# Patient Record
Sex: MALE | Race: WHITE | NOT HISPANIC OR LATINO | Employment: UNEMPLOYED | URBAN - NONMETROPOLITAN AREA
[De-identification: names, ages, dates, MRNs, and addresses within clinical notes are randomized per-mention and may not be internally consistent; named-entity substitution may affect disease eponyms.]

---

## 2017-12-30 ENCOUNTER — APPOINTMENT (OUTPATIENT)
Dept: GENERAL RADIOLOGY | Facility: HOSPITAL | Age: 52
End: 2017-12-30

## 2017-12-30 ENCOUNTER — HOSPITAL ENCOUNTER (EMERGENCY)
Facility: HOSPITAL | Age: 52
Discharge: HOME | End: 2017-12-30
Attending: FAMILY MEDICINE | Admitting: FAMILY MEDICINE

## 2017-12-30 ENCOUNTER — APPOINTMENT (OUTPATIENT)
Dept: CT IMAGING | Facility: HOSPITAL | Age: 52
End: 2017-12-30

## 2017-12-30 VITALS
OXYGEN SATURATION: 98 % | DIASTOLIC BLOOD PRESSURE: 66 MMHG | BODY MASS INDEX: 22.73 KG/M2 | TEMPERATURE: 98 F | RESPIRATION RATE: 18 BRPM | WEIGHT: 150 LBS | HEIGHT: 68 IN | SYSTOLIC BLOOD PRESSURE: 100 MMHG | HEART RATE: 84 BPM

## 2017-12-30 DIAGNOSIS — F10.10 ALCOHOL ABUSE: ICD-10-CM

## 2017-12-30 DIAGNOSIS — J44.1 COPD EXACERBATION (HCC): Primary | ICD-10-CM

## 2017-12-30 LAB
6-ACETYL MORPHINE: NEGATIVE
ALBUMIN SERPL-MCNC: 4.6 G/DL (ref 3.5–5)
ALBUMIN/GLOB SERPL: 1.8 G/DL (ref 1.5–2.5)
ALP SERPL-CCNC: 46 U/L (ref 40–129)
ALT SERPL W P-5'-P-CCNC: 31 U/L (ref 10–44)
AMPHET+METHAMPHET UR QL: NEGATIVE
ANION GAP SERPL CALCULATED.3IONS-SCNC: 5.6 MMOL/L (ref 3.6–11.2)
APTT PPP: 28.6 SECONDS (ref 23.8–36.1)
AST SERPL-CCNC: 31 U/L (ref 10–34)
BARBITURATES UR QL SCN: NEGATIVE
BASOPHILS # BLD AUTO: 0.12 10*3/MM3 (ref 0–0.3)
BASOPHILS NFR BLD AUTO: 1.1 % (ref 0–2)
BENZODIAZ UR QL SCN: NEGATIVE
BILIRUB SERPL-MCNC: 0.2 MG/DL (ref 0.2–1.8)
BILIRUB UR QL STRIP: NEGATIVE
BUN BLD-MCNC: 6 MG/DL (ref 7–21)
BUN/CREAT SERPL: 8.3 (ref 7–25)
BUPRENORPHINE SERPL-MCNC: NEGATIVE NG/ML
CALCIUM SPEC-SCNC: 9.1 MG/DL (ref 7.7–10)
CANNABINOIDS SERPL QL: NEGATIVE
CHLORIDE SERPL-SCNC: 103 MMOL/L (ref 99–112)
CLARITY UR: CLEAR
CO2 SERPL-SCNC: 26.4 MMOL/L (ref 24.3–31.9)
COCAINE UR QL: NEGATIVE
COLOR UR: YELLOW
CREAT BLD-MCNC: 0.72 MG/DL (ref 0.43–1.29)
DEPRECATED RDW RBC AUTO: 47.6 FL (ref 37–54)
EOSINOPHIL # BLD AUTO: 0.79 10*3/MM3 (ref 0–0.7)
EOSINOPHIL NFR BLD AUTO: 7.5 % (ref 0–5)
ERYTHROCYTE [DISTWIDTH] IN BLOOD BY AUTOMATED COUNT: 13.5 % (ref 11.5–14.5)
ETHANOL BLD-MCNC: 280 MG/DL
ETHANOL UR QL: 0.28 %
GFR SERPL CREATININE-BSD FRML MDRD: 115 ML/MIN/1.73
GLOBULIN UR ELPH-MCNC: 2.6 GM/DL
GLUCOSE BLD-MCNC: 99 MG/DL (ref 70–110)
GLUCOSE UR STRIP-MCNC: NEGATIVE MG/DL
HCT VFR BLD AUTO: 45.3 % (ref 42–52)
HGB BLD-MCNC: 15.2 G/DL (ref 14–18)
HGB UR QL STRIP.AUTO: NEGATIVE
IMM GRANULOCYTES # BLD: 0.09 10*3/MM3 (ref 0–0.03)
IMM GRANULOCYTES NFR BLD: 0.9 % (ref 0–0.5)
INR PPP: 0.91 (ref 0.9–1.1)
KETONES UR QL STRIP: NEGATIVE
LEUKOCYTE ESTERASE UR QL STRIP.AUTO: NEGATIVE
LYMPHOCYTES # BLD AUTO: 4.78 10*3/MM3 (ref 1–3)
LYMPHOCYTES NFR BLD AUTO: 45.4 % (ref 21–51)
MAGNESIUM SERPL-MCNC: 2 MG/DL (ref 1.7–2.6)
MCH RBC QN AUTO: 32.2 PG (ref 27–33)
MCHC RBC AUTO-ENTMCNC: 33.6 G/DL (ref 33–37)
MCV RBC AUTO: 96 FL (ref 80–94)
METHADONE UR QL SCN: NEGATIVE
MONOCYTES # BLD AUTO: 0.74 10*3/MM3 (ref 0.1–0.9)
MONOCYTES NFR BLD AUTO: 7 % (ref 0–10)
NEUTROPHILS # BLD AUTO: 4 10*3/MM3 (ref 1.4–6.5)
NEUTROPHILS NFR BLD AUTO: 38.1 % (ref 30–70)
NITRITE UR QL STRIP: NEGATIVE
OPIATES UR QL: NEGATIVE
OSMOLALITY SERPL CALC.SUM OF ELEC: 267.7 MOSM/KG (ref 273–305)
OXYCODONE UR QL SCN: NEGATIVE
PCP UR QL SCN: NEGATIVE
PH UR STRIP.AUTO: 5.5 [PH] (ref 5–8)
PLATELET # BLD AUTO: 314 10*3/MM3 (ref 130–400)
PMV BLD AUTO: 9.9 FL (ref 6–10)
POTASSIUM BLD-SCNC: 4.1 MMOL/L (ref 3.5–5.3)
PROT SERPL-MCNC: 7.2 G/DL (ref 6–8)
PROT UR QL STRIP: NEGATIVE
PROTHROMBIN TIME: 12.4 SECONDS (ref 11–15.4)
RBC # BLD AUTO: 4.72 10*6/MM3 (ref 4.7–6.1)
SODIUM BLD-SCNC: 135 MMOL/L (ref 135–153)
SP GR UR STRIP: 1.01 (ref 1–1.03)
UROBILINOGEN UR QL STRIP: NORMAL
WBC NRBC COR # BLD: 10.52 10*3/MM3 (ref 4.5–12.5)

## 2017-12-30 PROCEDURE — 80307 DRUG TEST PRSMV CHEM ANLYZR: CPT | Performed by: PHYSICIAN ASSISTANT

## 2017-12-30 PROCEDURE — 71010 HC CHEST PA OR AP: CPT

## 2017-12-30 PROCEDURE — 96374 THER/PROPH/DIAG INJ IV PUSH: CPT

## 2017-12-30 PROCEDURE — 94640 AIRWAY INHALATION TREATMENT: CPT

## 2017-12-30 PROCEDURE — 70450 CT HEAD/BRAIN W/O DYE: CPT | Performed by: RADIOLOGY

## 2017-12-30 PROCEDURE — 70450 CT HEAD/BRAIN W/O DYE: CPT

## 2017-12-30 PROCEDURE — 83735 ASSAY OF MAGNESIUM: CPT | Performed by: PHYSICIAN ASSISTANT

## 2017-12-30 PROCEDURE — 94799 UNLISTED PULMONARY SVC/PX: CPT

## 2017-12-30 PROCEDURE — 80053 COMPREHEN METABOLIC PANEL: CPT | Performed by: PHYSICIAN ASSISTANT

## 2017-12-30 PROCEDURE — 85730 THROMBOPLASTIN TIME PARTIAL: CPT | Performed by: PHYSICIAN ASSISTANT

## 2017-12-30 PROCEDURE — 85025 COMPLETE CBC W/AUTO DIFF WBC: CPT | Performed by: PHYSICIAN ASSISTANT

## 2017-12-30 PROCEDURE — 81003 URINALYSIS AUTO W/O SCOPE: CPT | Performed by: PHYSICIAN ASSISTANT

## 2017-12-30 PROCEDURE — 25010000002 METHYLPREDNISOLONE PER 125 MG: Performed by: PHYSICIAN ASSISTANT

## 2017-12-30 PROCEDURE — 71010 XR CHEST 1 VW: CPT | Performed by: RADIOLOGY

## 2017-12-30 PROCEDURE — 96361 HYDRATE IV INFUSION ADD-ON: CPT

## 2017-12-30 PROCEDURE — 36415 COLL VENOUS BLD VENIPUNCTURE: CPT

## 2017-12-30 PROCEDURE — 99284 EMERGENCY DEPT VISIT MOD MDM: CPT

## 2017-12-30 PROCEDURE — 85610 PROTHROMBIN TIME: CPT | Performed by: PHYSICIAN ASSISTANT

## 2017-12-30 RX ORDER — METHYLPREDNISOLONE SODIUM SUCCINATE 125 MG/2ML
125 INJECTION, POWDER, LYOPHILIZED, FOR SOLUTION INTRAMUSCULAR; INTRAVENOUS ONCE
Status: COMPLETED | OUTPATIENT
Start: 2017-12-30 | End: 2017-12-30

## 2017-12-30 RX ORDER — ALBUTEROL SULFATE 90 UG/1
2 AEROSOL, METERED RESPIRATORY (INHALATION) EVERY 4 HOURS PRN
Qty: 18 G | Refills: 0 | Status: SHIPPED | OUTPATIENT
Start: 2017-12-30 | End: 2018-01-06

## 2017-12-30 RX ORDER — AZITHROMYCIN 250 MG/1
TABLET, FILM COATED ORAL
Qty: 6 TABLET | Refills: 0 | Status: ON HOLD | OUTPATIENT
Start: 2017-12-30 | End: 2021-11-30

## 2017-12-30 RX ORDER — IPRATROPIUM BROMIDE AND ALBUTEROL SULFATE 2.5; .5 MG/3ML; MG/3ML
3 SOLUTION RESPIRATORY (INHALATION) ONCE
Status: COMPLETED | OUTPATIENT
Start: 2017-12-30 | End: 2017-12-30

## 2017-12-30 RX ORDER — SODIUM CHLORIDE 0.9 % (FLUSH) 0.9 %
10 SYRINGE (ML) INJECTION AS NEEDED
Status: DISCONTINUED | OUTPATIENT
Start: 2017-12-30 | End: 2017-12-30 | Stop reason: HOSPADM

## 2017-12-30 RX ORDER — ALBUTEROL SULFATE 2.5 MG/3ML
2.5 SOLUTION RESPIRATORY (INHALATION) EVERY 4 HOURS PRN
Status: ON HOLD | COMMUNITY
End: 2021-11-30

## 2017-12-30 RX ADMIN — METHYLPREDNISOLONE SODIUM SUCCINATE 125 MG: 125 INJECTION, POWDER, FOR SOLUTION INTRAMUSCULAR; INTRAVENOUS at 03:46

## 2017-12-30 RX ADMIN — IPRATROPIUM BROMIDE AND ALBUTEROL SULFATE 3 ML: .5; 3 SOLUTION RESPIRATORY (INHALATION) at 04:33

## 2017-12-30 RX ADMIN — IPRATROPIUM BROMIDE AND ALBUTEROL SULFATE 3 ML: .5; 3 SOLUTION RESPIRATORY (INHALATION) at 02:34

## 2017-12-30 RX ADMIN — SODIUM CHLORIDE 1000 ML: 9 INJECTION, SOLUTION INTRAVENOUS at 02:32

## 2017-12-30 RX ADMIN — IPRATROPIUM BROMIDE AND ALBUTEROL SULFATE 3 ML: .5; 3 SOLUTION RESPIRATORY (INHALATION) at 03:14

## 2018-10-09 DIAGNOSIS — R06.02 SOB (SHORTNESS OF BREATH): Primary | ICD-10-CM

## 2019-04-29 DIAGNOSIS — R06.02 SOB (SHORTNESS OF BREATH): Primary | ICD-10-CM

## 2021-11-30 ENCOUNTER — HOSPITAL ENCOUNTER (INPATIENT)
Facility: HOSPITAL | Age: 56
LOS: 5 days | Discharge: HOME | End: 2021-12-05
Attending: EMERGENCY MEDICINE | Admitting: STUDENT IN AN ORGANIZED HEALTH CARE EDUCATION/TRAINING PROGRAM

## 2021-11-30 ENCOUNTER — APPOINTMENT (OUTPATIENT)
Dept: GENERAL RADIOLOGY | Facility: HOSPITAL | Age: 56
End: 2021-11-30

## 2021-11-30 ENCOUNTER — APPOINTMENT (OUTPATIENT)
Dept: CT IMAGING | Facility: HOSPITAL | Age: 56
End: 2021-11-30

## 2021-11-30 ENCOUNTER — APPOINTMENT (OUTPATIENT)
Dept: CARDIOLOGY | Facility: HOSPITAL | Age: 56
End: 2021-11-30

## 2021-11-30 DIAGNOSIS — J96.22 ACUTE ON CHRONIC RESPIRATORY FAILURE WITH HYPOXIA AND HYPERCAPNIA (HCC): ICD-10-CM

## 2021-11-30 DIAGNOSIS — J44.1 COPD WITH ACUTE EXACERBATION (HCC): Primary | ICD-10-CM

## 2021-11-30 DIAGNOSIS — J96.10 CHRONIC RESPIRATORY FAILURE (HCC): ICD-10-CM

## 2021-11-30 DIAGNOSIS — J96.21 ACUTE ON CHRONIC RESPIRATORY FAILURE WITH HYPOXIA AND HYPERCAPNIA (HCC): ICD-10-CM

## 2021-11-30 DIAGNOSIS — J44.9 CHRONIC OBSTRUCTIVE PULMONARY DISEASE (COPD) (HCC): ICD-10-CM

## 2021-11-30 DIAGNOSIS — J18.9 PNEUMONIA OF BOTH LUNGS DUE TO INFECTIOUS ORGANISM, UNSPECIFIED PART OF LUNG: ICD-10-CM

## 2021-11-30 LAB
A-A DO2: 120.8 MMHG (ref 0–300)
A-A DO2: 123.4 MMHG (ref 0–300)
ALBUMIN SERPL-MCNC: 4.88 G/DL (ref 3.5–5.2)
ALBUMIN/GLOB SERPL: 1.4 G/DL
ALP SERPL-CCNC: 73 U/L (ref 39–117)
ALT SERPL W P-5'-P-CCNC: 21 U/L (ref 1–41)
ANION GAP SERPL CALCULATED.3IONS-SCNC: 13.7 MMOL/L (ref 5–15)
APTT PPP: 32.1 SECONDS (ref 25.5–35.4)
ARTERIAL PATENCY WRIST A: POSITIVE
ARTERIAL PATENCY WRIST A: POSITIVE
AST SERPL-CCNC: 35 U/L (ref 1–40)
ATMOSPHERIC PRESS: 727 MMHG
ATMOSPHERIC PRESS: 727 MMHG
BASE EXCESS BLDA CALC-SCNC: -0.8 MMOL/L (ref 0–2)
BASE EXCESS BLDA CALC-SCNC: 1.1 MMOL/L (ref 0–2)
BASOPHILS # BLD AUTO: 0.06 10*3/MM3 (ref 0–0.2)
BASOPHILS NFR BLD AUTO: 0.5 % (ref 0–1.5)
BDY SITE: ABNORMAL
BDY SITE: ABNORMAL
BH CV ECHO MEAS - % IVS THICK: -3.4 %
BH CV ECHO MEAS - % LVPW THICK: 22.4 %
BH CV ECHO MEAS - ACS: 2 CM
BH CV ECHO MEAS - AO MAX PG: 9.7 MMHG
BH CV ECHO MEAS - AO MEAN PG: 5 MMHG
BH CV ECHO MEAS - AO ROOT AREA (BSA CORRECTED): 1.6
BH CV ECHO MEAS - AO ROOT AREA: 7.5 CM^2
BH CV ECHO MEAS - AO ROOT DIAM: 3.1 CM
BH CV ECHO MEAS - AO V2 MAX: 156 CM/SEC
BH CV ECHO MEAS - AO V2 MEAN: 100 CM/SEC
BH CV ECHO MEAS - AO V2 VTI: 30.3 CM
BH CV ECHO MEAS - BSA(HAYCOCK): 2 M^2
BH CV ECHO MEAS - BSA: 1.9 M^2
BH CV ECHO MEAS - BZI_BMI: 27.1 KILOGRAMS/M^2
BH CV ECHO MEAS - BZI_METRIC_HEIGHT: 172.7 CM
BH CV ECHO MEAS - BZI_METRIC_WEIGHT: 80.7 KG
BH CV ECHO MEAS - EDV(CUBED): 48.4 ML
BH CV ECHO MEAS - EDV(MOD-SP4): 62.5 ML
BH CV ECHO MEAS - EDV(TEICH): 56.1 ML
BH CV ECHO MEAS - EF(CUBED): 73 %
BH CV ECHO MEAS - EF(MOD-SP4): 71.8 %
BH CV ECHO MEAS - EF(TEICH): 65.7 %
BH CV ECHO MEAS - ESV(CUBED): 13.1 ML
BH CV ECHO MEAS - ESV(MOD-SP4): 17.6 ML
BH CV ECHO MEAS - ESV(TEICH): 19.2 ML
BH CV ECHO MEAS - FS: 35.4 %
BH CV ECHO MEAS - IVS/LVPW: 1.1
BH CV ECHO MEAS - IVSD: 1.2 CM
BH CV ECHO MEAS - IVSS: 1.2 CM
BH CV ECHO MEAS - LA DIMENSION: 2.4 CM
BH CV ECHO MEAS - LA/AO: 0.76
BH CV ECHO MEAS - LV DIASTOLIC VOL/BSA (35-75): 32.1 ML/M^2
BH CV ECHO MEAS - LV MASS(C)D: 131.6 GRAMS
BH CV ECHO MEAS - LV MASS(C)DI: 67.7 GRAMS/M^2
BH CV ECHO MEAS - LV MASS(C)S: 82.6 GRAMS
BH CV ECHO MEAS - LV MASS(C)SI: 42.5 GRAMS/M^2
BH CV ECHO MEAS - LV SYSTOLIC VOL/BSA (12-30): 9 ML/M^2
BH CV ECHO MEAS - LVIDD: 3.6 CM
BH CV ECHO MEAS - LVIDS: 2.4 CM
BH CV ECHO MEAS - LVLD AP4: 5.9 CM
BH CV ECHO MEAS - LVLS AP4: 4.5 CM
BH CV ECHO MEAS - LVOT AREA (M): 3.5 CM^2
BH CV ECHO MEAS - LVOT AREA: 3.5 CM^2
BH CV ECHO MEAS - LVOT DIAM: 2.1 CM
BH CV ECHO MEAS - LVPWD: 1.1 CM
BH CV ECHO MEAS - LVPWS: 1.3 CM
BH CV ECHO MEAS - MV A MAX VEL: 65.1 CM/SEC
BH CV ECHO MEAS - MV E MAX VEL: 71.1 CM/SEC
BH CV ECHO MEAS - MV E/A: 1.1
BH CV ECHO MEAS - PA ACC TIME: 0.09 SEC
BH CV ECHO MEAS - PA PR(ACCEL): 39.4 MMHG
BH CV ECHO MEAS - SI(AO): 117.6 ML/M^2
BH CV ECHO MEAS - SI(CUBED): 18.2 ML/M^2
BH CV ECHO MEAS - SI(MOD-SP4): 23.1 ML/M^2
BH CV ECHO MEAS - SI(TEICH): 18.9 ML/M^2
BH CV ECHO MEAS - SV(AO): 228.7 ML
BH CV ECHO MEAS - SV(CUBED): 35.4 ML
BH CV ECHO MEAS - SV(MOD-SP4): 44.9 ML
BH CV ECHO MEAS - SV(TEICH): 36.9 ML
BILIRUB SERPL-MCNC: 0.3 MG/DL (ref 0–1.2)
BODY TEMPERATURE: 0 C
BODY TEMPERATURE: 0 C
BUN SERPL-MCNC: 14 MG/DL (ref 6–20)
BUN/CREAT SERPL: 15.9 (ref 7–25)
CALCIUM SPEC-SCNC: 9.3 MG/DL (ref 8.6–10.5)
CHLORIDE SERPL-SCNC: 92 MMOL/L (ref 98–107)
CO2 BLDA-SCNC: 28.1 MMOL/L (ref 22–33)
CO2 BLDA-SCNC: 31.8 MMOL/L (ref 22–33)
CO2 SERPL-SCNC: 25.3 MMOL/L (ref 22–29)
COHGB MFR BLD: 1.7 % (ref 0–5)
COHGB MFR BLD: 2.1 % (ref 0–5)
CREAT SERPL-MCNC: 0.88 MG/DL (ref 0.76–1.27)
CRP SERPL-MCNC: 9.4 MG/DL (ref 0–0.5)
D-LACTATE SERPL-SCNC: 1.1 MMOL/L (ref 0.5–2)
DEPRECATED RDW RBC AUTO: 45.2 FL (ref 37–54)
EOSINOPHIL # BLD AUTO: 0 10*3/MM3 (ref 0–0.4)
EOSINOPHIL NFR BLD AUTO: 0 % (ref 0.3–6.2)
EPAP: 8
ERYTHROCYTE [DISTWIDTH] IN BLOOD BY AUTOMATED COUNT: 12.5 % (ref 12.3–15.4)
ETHANOL BLD-MCNC: <10 MG/DL (ref 0–10)
ETHANOL UR QL: <0.01 %
FLUAV RNA RESP QL NAA+PROBE: NOT DETECTED
FLUBV RNA RESP QL NAA+PROBE: NOT DETECTED
GFR SERPL CREATININE-BSD FRML MDRD: 90 ML/MIN/1.73
GLOBULIN UR ELPH-MCNC: 3.4 GM/DL
GLUCOSE SERPL-MCNC: 103 MG/DL (ref 65–99)
HCO3 BLDA-SCNC: 26.5 MMOL/L (ref 20–26)
HCO3 BLDA-SCNC: 29.9 MMOL/L (ref 20–26)
HCT VFR BLD AUTO: 48.7 % (ref 37.5–51)
HCT VFR BLD CALC: 47.8 % (ref 38–51)
HCT VFR BLD CALC: 50 % (ref 38–51)
HGB BLD-MCNC: 15.5 G/DL (ref 13–17.7)
HGB BLDA-MCNC: 15.6 G/DL (ref 14–18)
HGB BLDA-MCNC: 16.3 G/DL (ref 14–18)
HOLD SPECIMEN: NORMAL
HOLD SPECIMEN: NORMAL
IMM GRANULOCYTES # BLD AUTO: 0.06 10*3/MM3 (ref 0–0.05)
IMM GRANULOCYTES NFR BLD AUTO: 0.5 % (ref 0–0.5)
INHALED O2 CONCENTRATION: 36 %
INHALED O2 CONCENTRATION: 40 %
INR PPP: 0.92 (ref 0.9–1.1)
IPAP: 24
LYMPHOCYTES # BLD AUTO: 1.76 10*3/MM3 (ref 0.7–3.1)
LYMPHOCYTES NFR BLD AUTO: 14.9 % (ref 19.6–45.3)
Lab: ABNORMAL
MAGNESIUM SERPL-MCNC: 2.4 MG/DL (ref 1.6–2.6)
MAXIMAL PREDICTED HEART RATE: 165 BPM
MCH RBC QN AUTO: 31 PG (ref 26.6–33)
MCHC RBC AUTO-ENTMCNC: 31.8 G/DL (ref 31.5–35.7)
MCV RBC AUTO: 97.4 FL (ref 79–97)
METHGB BLD QL: 0.4 % (ref 0–3)
METHGB BLD QL: 0.4 % (ref 0–3)
MODALITY: ABNORMAL
MODALITY: ABNORMAL
MONOCYTES # BLD AUTO: 0.74 10*3/MM3 (ref 0.1–0.9)
MONOCYTES NFR BLD AUTO: 6.3 % (ref 5–12)
NEUTROPHILS NFR BLD AUTO: 77.8 % (ref 42.7–76)
NEUTROPHILS NFR BLD AUTO: 9.18 10*3/MM3 (ref 1.7–7)
NOTE: ABNORMAL
NOTE: ABNORMAL
NOTIFIED BY: ABNORMAL
NOTIFIED WHO: ABNORMAL
NRBC BLD AUTO-RTO: 0 /100 WBC (ref 0–0.2)
NT-PROBNP SERPL-MCNC: 144.6 PG/ML (ref 0–900)
OXYHGB MFR BLDV: 79.6 % (ref 94–99)
OXYHGB MFR BLDV: 94.5 % (ref 94–99)
PCO2 BLDA: 53 MM HG (ref 35–45)
PCO2 BLDA: 63.6 MM HG (ref 35–45)
PCO2 TEMP ADJ BLD: ABNORMAL MM[HG]
PCO2 TEMP ADJ BLD: ABNORMAL MM[HG]
PH BLDA: 7.28 PH UNITS (ref 7.35–7.45)
PH BLDA: 7.31 PH UNITS (ref 7.35–7.45)
PH, TEMP CORRECTED: ABNORMAL
PH, TEMP CORRECTED: ABNORMAL
PHOSPHATE SERPL-MCNC: 4.8 MG/DL (ref 2.5–4.5)
PLATELET # BLD AUTO: 285 10*3/MM3 (ref 140–450)
PMV BLD AUTO: 10.1 FL (ref 6–12)
PO2 BLDA: 51.1 MM HG (ref 83–108)
PO2 BLDA: 92.9 MM HG (ref 83–108)
PO2 TEMP ADJ BLD: ABNORMAL MM[HG]
PO2 TEMP ADJ BLD: ABNORMAL MM[HG]
POTASSIUM SERPL-SCNC: 5.2 MMOL/L (ref 3.5–5.2)
PROCALCITONIN SERPL-MCNC: 0.17 NG/ML (ref 0–0.25)
PROT SERPL-MCNC: 8.3 G/DL (ref 6–8.5)
PROTHROMBIN TIME: 12.8 SECONDS (ref 12.8–14.5)
QT INTERVAL: 286 MS
QT INTERVAL: 314 MS
QTC INTERVAL: 420 MS
QTC INTERVAL: 458 MS
RBC # BLD AUTO: 5 10*6/MM3 (ref 4.14–5.8)
SAO2 % BLDCOA: 81.6 % (ref 94–99)
SAO2 % BLDCOA: 96.5 % (ref 94–99)
SARS-COV-2 RNA RESP QL NAA+PROBE: NOT DETECTED
SET MECH RESP RATE: 24
SODIUM SERPL-SCNC: 131 MMOL/L (ref 136–145)
STRESS TARGET HR: 140 BPM
TROPONIN T SERPL-MCNC: <0.01 NG/ML (ref 0–0.03)
TROPONIN T SERPL-MCNC: <0.01 NG/ML (ref 0–0.03)
TSH SERPL DL<=0.05 MIU/L-ACNC: 0.85 UIU/ML (ref 0.27–4.2)
VENTILATOR MODE: ABNORMAL
VENTILATOR MODE: ABNORMAL
WBC NRBC COR # BLD: 11.8 10*3/MM3 (ref 3.4–10.8)
WHOLE BLOOD HOLD SPECIMEN: NORMAL
WHOLE BLOOD HOLD SPECIMEN: NORMAL

## 2021-11-30 PROCEDURE — 94799 UNLISTED PULMONARY SVC/PX: CPT

## 2021-11-30 PROCEDURE — 25010000002 METHYLPREDNISOLONE PER 125 MG: Performed by: EMERGENCY MEDICINE

## 2021-11-30 PROCEDURE — 93306 TTE W/DOPPLER COMPLETE: CPT | Performed by: SPECIALIST

## 2021-11-30 PROCEDURE — 82077 ASSAY SPEC XCP UR&BREATH IA: CPT | Performed by: EMERGENCY MEDICINE

## 2021-11-30 PROCEDURE — 0 IOPAMIDOL PER 1 ML: Performed by: EMERGENCY MEDICINE

## 2021-11-30 PROCEDURE — 85730 THROMBOPLASTIN TIME PARTIAL: CPT | Performed by: EMERGENCY MEDICINE

## 2021-11-30 PROCEDURE — 85025 COMPLETE CBC W/AUTO DIFF WBC: CPT | Performed by: EMERGENCY MEDICINE

## 2021-11-30 PROCEDURE — 93306 TTE W/DOPPLER COMPLETE: CPT

## 2021-11-30 PROCEDURE — 25010000002 LORAZEPAM PER 2 MG: Performed by: EMERGENCY MEDICINE

## 2021-11-30 PROCEDURE — 83735 ASSAY OF MAGNESIUM: CPT | Performed by: EMERGENCY MEDICINE

## 2021-11-30 PROCEDURE — 83050 HGB METHEMOGLOBIN QUAN: CPT

## 2021-11-30 PROCEDURE — 83605 ASSAY OF LACTIC ACID: CPT | Performed by: EMERGENCY MEDICINE

## 2021-11-30 PROCEDURE — 99223 1ST HOSP IP/OBS HIGH 75: CPT | Performed by: STUDENT IN AN ORGANIZED HEALTH CARE EDUCATION/TRAINING PROGRAM

## 2021-11-30 PROCEDURE — 82375 ASSAY CARBOXYHB QUANT: CPT

## 2021-11-30 PROCEDURE — 71045 X-RAY EXAM CHEST 1 VIEW: CPT

## 2021-11-30 PROCEDURE — 80053 COMPREHEN METABOLIC PANEL: CPT | Performed by: EMERGENCY MEDICINE

## 2021-11-30 PROCEDURE — 25010000002 THIAMINE PER 100 MG: Performed by: STUDENT IN AN ORGANIZED HEALTH CARE EDUCATION/TRAINING PROGRAM

## 2021-11-30 PROCEDURE — 87040 BLOOD CULTURE FOR BACTERIA: CPT | Performed by: EMERGENCY MEDICINE

## 2021-11-30 PROCEDURE — 94660 CPAP INITIATION&MGMT: CPT

## 2021-11-30 PROCEDURE — 25010000002 ENOXAPARIN PER 10 MG: Performed by: STUDENT IN AN ORGANIZED HEALTH CARE EDUCATION/TRAINING PROGRAM

## 2021-11-30 PROCEDURE — 83880 ASSAY OF NATRIURETIC PEPTIDE: CPT | Performed by: EMERGENCY MEDICINE

## 2021-11-30 PROCEDURE — 25010000002 METHYLPREDNISOLONE PER 125 MG: Performed by: STUDENT IN AN ORGANIZED HEALTH CARE EDUCATION/TRAINING PROGRAM

## 2021-11-30 PROCEDURE — 25010000002 LORAZEPAM PER 2 MG: Performed by: STUDENT IN AN ORGANIZED HEALTH CARE EDUCATION/TRAINING PROGRAM

## 2021-11-30 PROCEDURE — 87636 SARSCOV2 & INF A&B AMP PRB: CPT | Performed by: EMERGENCY MEDICINE

## 2021-11-30 PROCEDURE — 84484 ASSAY OF TROPONIN QUANT: CPT | Performed by: EMERGENCY MEDICINE

## 2021-11-30 PROCEDURE — 84443 ASSAY THYROID STIM HORMONE: CPT | Performed by: PHYSICIAN ASSISTANT

## 2021-11-30 PROCEDURE — 36600 WITHDRAWAL OF ARTERIAL BLOOD: CPT

## 2021-11-30 PROCEDURE — 94640 AIRWAY INHALATION TREATMENT: CPT

## 2021-11-30 PROCEDURE — 93005 ELECTROCARDIOGRAM TRACING: CPT | Performed by: EMERGENCY MEDICINE

## 2021-11-30 PROCEDURE — 82805 BLOOD GASES W/O2 SATURATION: CPT

## 2021-11-30 PROCEDURE — 71275 CT ANGIOGRAPHY CHEST: CPT

## 2021-11-30 PROCEDURE — 84100 ASSAY OF PHOSPHORUS: CPT | Performed by: EMERGENCY MEDICINE

## 2021-11-30 PROCEDURE — 99284 EMERGENCY DEPT VISIT MOD MDM: CPT

## 2021-11-30 PROCEDURE — 93010 ELECTROCARDIOGRAM REPORT: CPT | Performed by: INTERNAL MEDICINE

## 2021-11-30 PROCEDURE — 25010000002 CEFTRIAXONE PER 250 MG: Performed by: EMERGENCY MEDICINE

## 2021-11-30 PROCEDURE — 84145 PROCALCITONIN (PCT): CPT | Performed by: EMERGENCY MEDICINE

## 2021-11-30 PROCEDURE — 85610 PROTHROMBIN TIME: CPT | Performed by: EMERGENCY MEDICINE

## 2021-11-30 PROCEDURE — 86140 C-REACTIVE PROTEIN: CPT | Performed by: EMERGENCY MEDICINE

## 2021-11-30 PROCEDURE — 36415 COLL VENOUS BLD VENIPUNCTURE: CPT

## 2021-11-30 RX ORDER — SODIUM CHLORIDE 0.9 % (FLUSH) 0.9 %
10 SYRINGE (ML) INJECTION AS NEEDED
Status: DISCONTINUED | OUTPATIENT
Start: 2021-11-30 | End: 2021-12-05 | Stop reason: HOSPADM

## 2021-11-30 RX ORDER — LORAZEPAM 2 MG/ML
1 INJECTION INTRAMUSCULAR
Status: DISCONTINUED | OUTPATIENT
Start: 2021-11-30 | End: 2021-12-05 | Stop reason: HOSPADM

## 2021-11-30 RX ORDER — GUAIFENESIN/DEXTROMETHORPHAN 100-10MG/5
10 SYRUP ORAL EVERY 6 HOURS PRN
Status: DISCONTINUED | OUTPATIENT
Start: 2021-11-30 | End: 2021-12-05 | Stop reason: HOSPADM

## 2021-11-30 RX ORDER — METHYLPREDNISOLONE SODIUM SUCCINATE 125 MG/2ML
0.5 INJECTION, POWDER, LYOPHILIZED, FOR SOLUTION INTRAMUSCULAR; INTRAVENOUS 2 TIMES DAILY
Status: DISCONTINUED | OUTPATIENT
Start: 2021-11-30 | End: 2021-11-30

## 2021-11-30 RX ORDER — ASPIRIN 81 MG/1
81 TABLET ORAL DAILY
Status: DISCONTINUED | OUTPATIENT
Start: 2021-11-30 | End: 2021-12-05 | Stop reason: HOSPADM

## 2021-11-30 RX ORDER — LORAZEPAM 1 MG/1
1 TABLET ORAL
Status: DISCONTINUED | OUTPATIENT
Start: 2021-11-30 | End: 2021-12-05 | Stop reason: HOSPADM

## 2021-11-30 RX ORDER — IPRATROPIUM BROMIDE AND ALBUTEROL SULFATE 2.5; .5 MG/3ML; MG/3ML
3 SOLUTION RESPIRATORY (INHALATION) ONCE
Status: COMPLETED | OUTPATIENT
Start: 2021-11-30 | End: 2021-11-30

## 2021-11-30 RX ORDER — ONDANSETRON 2 MG/ML
4 INJECTION INTRAMUSCULAR; INTRAVENOUS EVERY 6 HOURS PRN
Status: DISCONTINUED | OUTPATIENT
Start: 2021-11-30 | End: 2021-12-05 | Stop reason: HOSPADM

## 2021-11-30 RX ORDER — BUSPIRONE HYDROCHLORIDE 5 MG/1
7.5 TABLET ORAL 2 TIMES DAILY PRN
Status: DISCONTINUED | OUTPATIENT
Start: 2021-11-30 | End: 2021-12-05 | Stop reason: HOSPADM

## 2021-11-30 RX ORDER — LORAZEPAM 2 MG/ML
0.25 INJECTION INTRAMUSCULAR ONCE
Status: COMPLETED | OUTPATIENT
Start: 2021-11-30 | End: 2021-11-30

## 2021-11-30 RX ORDER — IPRATROPIUM BROMIDE AND ALBUTEROL SULFATE 2.5; .5 MG/3ML; MG/3ML
3 SOLUTION RESPIRATORY (INHALATION) EVERY 4 HOURS PRN
COMMUNITY

## 2021-11-30 RX ORDER — METHYLPREDNISOLONE SODIUM SUCCINATE 125 MG/2ML
60 INJECTION, POWDER, LYOPHILIZED, FOR SOLUTION INTRAMUSCULAR; INTRAVENOUS EVERY 8 HOURS
Status: DISCONTINUED | OUTPATIENT
Start: 2021-11-30 | End: 2021-12-01

## 2021-11-30 RX ORDER — METOPROLOL TARTRATE 5 MG/5ML
2.5 INJECTION INTRAVENOUS ONCE
Status: COMPLETED | OUTPATIENT
Start: 2021-11-30 | End: 2021-11-30

## 2021-11-30 RX ORDER — LORAZEPAM 2 MG/ML
2 INJECTION INTRAMUSCULAR
Status: DISCONTINUED | OUTPATIENT
Start: 2021-11-30 | End: 2021-12-05 | Stop reason: HOSPADM

## 2021-11-30 RX ORDER — PANTOPRAZOLE SODIUM 40 MG/1
40 TABLET, DELAYED RELEASE ORAL DAILY
Status: DISCONTINUED | OUTPATIENT
Start: 2021-11-30 | End: 2021-12-05 | Stop reason: HOSPADM

## 2021-11-30 RX ORDER — NITROGLYCERIN 0.4 MG/1
0.4 TABLET SUBLINGUAL
Status: DISCONTINUED | OUTPATIENT
Start: 2021-11-30 | End: 2021-12-05 | Stop reason: HOSPADM

## 2021-11-30 RX ORDER — ATORVASTATIN CALCIUM 40 MG/1
40 TABLET, FILM COATED ORAL NIGHTLY
Status: DISCONTINUED | OUTPATIENT
Start: 2021-11-30 | End: 2021-12-05 | Stop reason: HOSPADM

## 2021-11-30 RX ORDER — BUDESONIDE 0.5 MG/2ML
0.5 INHALANT ORAL
Status: DISCONTINUED | OUTPATIENT
Start: 2021-11-30 | End: 2021-12-05 | Stop reason: HOSPADM

## 2021-11-30 RX ORDER — ACETAMINOPHEN 500 MG
1000 TABLET ORAL ONCE
Status: COMPLETED | OUTPATIENT
Start: 2021-11-30 | End: 2021-11-30

## 2021-11-30 RX ORDER — METHYLPREDNISOLONE SODIUM SUCCINATE 125 MG/2ML
125 INJECTION, POWDER, LYOPHILIZED, FOR SOLUTION INTRAMUSCULAR; INTRAVENOUS ONCE
Status: COMPLETED | OUTPATIENT
Start: 2021-11-30 | End: 2021-11-30

## 2021-11-30 RX ORDER — ACETAMINOPHEN 325 MG/1
650 TABLET ORAL EVERY 4 HOURS PRN
Status: DISCONTINUED | OUTPATIENT
Start: 2021-11-30 | End: 2021-12-05 | Stop reason: HOSPADM

## 2021-11-30 RX ORDER — FLUTICASONE PROPIONATE 50 MCG
1 SPRAY, SUSPENSION (ML) NASAL DAILY
COMMUNITY

## 2021-11-30 RX ORDER — IPRATROPIUM BROMIDE AND ALBUTEROL SULFATE 2.5; .5 MG/3ML; MG/3ML
3 SOLUTION RESPIRATORY (INHALATION) EVERY 4 HOURS PRN
Status: DISCONTINUED | OUTPATIENT
Start: 2021-11-30 | End: 2021-12-05 | Stop reason: HOSPADM

## 2021-11-30 RX ORDER — ATORVASTATIN CALCIUM 10 MG/1
10 TABLET, FILM COATED ORAL DAILY
COMMUNITY
End: 2021-12-05 | Stop reason: HOSPADM

## 2021-11-30 RX ORDER — SODIUM CHLORIDE 0.9 % (FLUSH) 0.9 %
10 SYRINGE (ML) INJECTION EVERY 12 HOURS SCHEDULED
Status: DISCONTINUED | OUTPATIENT
Start: 2021-11-30 | End: 2021-12-05 | Stop reason: HOSPADM

## 2021-11-30 RX ORDER — FLUTICASONE PROPIONATE 50 MCG
1 SPRAY, SUSPENSION (ML) NASAL DAILY
Status: DISCONTINUED | OUTPATIENT
Start: 2021-11-30 | End: 2021-12-05 | Stop reason: HOSPADM

## 2021-11-30 RX ORDER — BUSPIRONE HYDROCHLORIDE 7.5 MG/1
7.5 TABLET ORAL 2 TIMES DAILY PRN
COMMUNITY
End: 2022-07-25

## 2021-11-30 RX ORDER — LORAZEPAM 2 MG/1
2 TABLET ORAL
Status: DISCONTINUED | OUTPATIENT
Start: 2021-11-30 | End: 2021-12-05 | Stop reason: HOSPADM

## 2021-11-30 RX ORDER — ARFORMOTEROL TARTRATE 15 UG/2ML
15 SOLUTION RESPIRATORY (INHALATION)
Status: DISCONTINUED | OUTPATIENT
Start: 2021-11-30 | End: 2021-12-05 | Stop reason: HOSPADM

## 2021-11-30 RX ORDER — IPRATROPIUM BROMIDE AND ALBUTEROL SULFATE 2.5; .5 MG/3ML; MG/3ML
3 SOLUTION RESPIRATORY (INHALATION) EVERY 4 HOURS PRN
Status: CANCELLED | OUTPATIENT
Start: 2021-11-30

## 2021-11-30 RX ORDER — PANTOPRAZOLE SODIUM 40 MG/1
40 TABLET, DELAYED RELEASE ORAL DAILY
COMMUNITY

## 2021-11-30 RX ORDER — IPRATROPIUM BROMIDE AND ALBUTEROL SULFATE 2.5; .5 MG/3ML; MG/3ML
3 SOLUTION RESPIRATORY (INHALATION)
Status: DISCONTINUED | OUTPATIENT
Start: 2021-11-30 | End: 2021-12-05 | Stop reason: HOSPADM

## 2021-11-30 RX ADMIN — METHYLPREDNISOLONE SODIUM SUCCINATE 60 MG: 125 INJECTION, POWDER, FOR SOLUTION INTRAMUSCULAR; INTRAVENOUS at 18:17

## 2021-11-30 RX ADMIN — METOPROLOL TARTRATE 2.5 MG: 5 INJECTION, SOLUTION INTRAVENOUS at 05:21

## 2021-11-30 RX ADMIN — IPRATROPIUM BROMIDE AND ALBUTEROL SULFATE 3 ML: .5; 3 SOLUTION RESPIRATORY (INHALATION) at 15:17

## 2021-11-30 RX ADMIN — IPRATROPIUM BROMIDE AND ALBUTEROL SULFATE 3 ML: .5; 3 SOLUTION RESPIRATORY (INHALATION) at 23:42

## 2021-11-30 RX ADMIN — DOXYCYCLINE 100 MG: 100 INJECTION, POWDER, LYOPHILIZED, FOR SOLUTION INTRAVENOUS at 04:13

## 2021-11-30 RX ADMIN — ENOXAPARIN SODIUM 40 MG: 40 INJECTION SUBCUTANEOUS at 12:17

## 2021-11-30 RX ADMIN — ATORVASTATIN CALCIUM 40 MG: 40 TABLET, FILM COATED ORAL at 21:06

## 2021-11-30 RX ADMIN — SODIUM CHLORIDE, PRESERVATIVE FREE 10 ML: 5 INJECTION INTRAVENOUS at 21:07

## 2021-11-30 RX ADMIN — CEFTRIAXONE SODIUM 1 G: 1 INJECTION, POWDER, FOR SOLUTION INTRAMUSCULAR; INTRAVENOUS at 03:50

## 2021-11-30 RX ADMIN — SODIUM CHLORIDE 1000 ML: 9 INJECTION, SOLUTION INTRAVENOUS at 07:52

## 2021-11-30 RX ADMIN — METOPROLOL TARTRATE 25 MG: 25 TABLET, FILM COATED ORAL at 21:06

## 2021-11-30 RX ADMIN — PANTOPRAZOLE SODIUM 40 MG: 40 TABLET, DELAYED RELEASE ORAL at 21:06

## 2021-11-30 RX ADMIN — THIAMINE HYDROCHLORIDE 100 ML/HR: 100 INJECTION, SOLUTION INTRAMUSCULAR; INTRAVENOUS at 12:17

## 2021-11-30 RX ADMIN — FLUTICASONE PROPIONATE 1 SPRAY: 50 SPRAY, METERED NASAL at 21:06

## 2021-11-30 RX ADMIN — ASPIRIN 81 MG: 81 TABLET, COATED ORAL at 21:06

## 2021-11-30 RX ADMIN — IPRATROPIUM BROMIDE AND ALBUTEROL SULFATE 3 ML: .5; 3 SOLUTION RESPIRATORY (INHALATION) at 17:51

## 2021-11-30 RX ADMIN — BUDESONIDE 0.5 MG: 0.5 INHALANT RESPIRATORY (INHALATION) at 22:03

## 2021-11-30 RX ADMIN — SODIUM CHLORIDE 500 ML: 9 INJECTION, SOLUTION INTRAVENOUS at 05:30

## 2021-11-30 RX ADMIN — IPRATROPIUM BROMIDE AND ALBUTEROL SULFATE 3 ML: .5; 3 SOLUTION RESPIRATORY (INHALATION) at 03:21

## 2021-11-30 RX ADMIN — SODIUM CHLORIDE, PRESERVATIVE FREE 10 ML: 5 INJECTION INTRAVENOUS at 12:18

## 2021-11-30 RX ADMIN — IOPAMIDOL 80 ML: 755 INJECTION, SOLUTION INTRAVENOUS at 06:55

## 2021-11-30 RX ADMIN — ARFORMOTEROL TARTRATE 15 MCG: 15 SOLUTION RESPIRATORY (INHALATION) at 22:03

## 2021-11-30 RX ADMIN — DOXYCYCLINE 100 MG: 100 INJECTION, POWDER, LYOPHILIZED, FOR SOLUTION INTRAVENOUS at 17:02

## 2021-11-30 RX ADMIN — METHYLPREDNISOLONE SODIUM SUCCINATE 60 MG: 125 INJECTION, POWDER, FOR SOLUTION INTRAMUSCULAR; INTRAVENOUS at 12:17

## 2021-11-30 RX ADMIN — METHYLPREDNISOLONE SODIUM SUCCINATE 125 MG: 125 INJECTION, POWDER, FOR SOLUTION INTRAMUSCULAR; INTRAVENOUS at 03:19

## 2021-11-30 RX ADMIN — LORAZEPAM 1 MG: 2 INJECTION INTRAMUSCULAR; INTRAVENOUS at 10:47

## 2021-11-30 RX ADMIN — ACETAMINOPHEN 1000 MG: 500 TABLET ORAL at 03:21

## 2021-11-30 RX ADMIN — LORAZEPAM 0.25 MG: 2 INJECTION, SOLUTION INTRAMUSCULAR; INTRAVENOUS at 04:02

## 2021-12-01 LAB
ANION GAP SERPL CALCULATED.3IONS-SCNC: 11.6 MMOL/L (ref 5–15)
B PARAPERT DNA SPEC QL NAA+PROBE: NOT DETECTED
B PERT DNA SPEC QL NAA+PROBE: NOT DETECTED
BASOPHILS # BLD AUTO: 0.02 10*3/MM3 (ref 0–0.2)
BASOPHILS NFR BLD AUTO: 0.1 % (ref 0–1.5)
BUN SERPL-MCNC: 25 MG/DL (ref 6–20)
BUN/CREAT SERPL: 34.7 (ref 7–25)
C PNEUM DNA NPH QL NAA+NON-PROBE: NOT DETECTED
CALCIUM SPEC-SCNC: 9.1 MG/DL (ref 8.6–10.5)
CHLORIDE SERPL-SCNC: 101 MMOL/L (ref 98–107)
CO2 SERPL-SCNC: 20.4 MMOL/L (ref 22–29)
CREAT SERPL-MCNC: 0.72 MG/DL (ref 0.76–1.27)
DEPRECATED RDW RBC AUTO: 48.7 FL (ref 37–54)
EOSINOPHIL # BLD AUTO: 0.01 10*3/MM3 (ref 0–0.4)
EOSINOPHIL NFR BLD AUTO: 0.1 % (ref 0.3–6.2)
ERYTHROCYTE [DISTWIDTH] IN BLOOD BY AUTOMATED COUNT: 12.7 % (ref 12.3–15.4)
FLUAV SUBTYP SPEC NAA+PROBE: NOT DETECTED
FLUBV RNA ISLT QL NAA+PROBE: NOT DETECTED
FOLATE SERPL-MCNC: 14.8 NG/ML (ref 4.78–24.2)
GFR SERPL CREATININE-BSD FRML MDRD: 113 ML/MIN/1.73
GLUCOSE SERPL-MCNC: 148 MG/DL (ref 65–99)
HADV DNA SPEC NAA+PROBE: NOT DETECTED
HCOV 229E RNA SPEC QL NAA+PROBE: NOT DETECTED
HCOV HKU1 RNA SPEC QL NAA+PROBE: NOT DETECTED
HCOV NL63 RNA SPEC QL NAA+PROBE: NOT DETECTED
HCOV OC43 RNA SPEC QL NAA+PROBE: NOT DETECTED
HCT VFR BLD AUTO: 46.2 % (ref 37.5–51)
HGB BLD-MCNC: 14.3 G/DL (ref 13–17.7)
HMPV RNA NPH QL NAA+NON-PROBE: NOT DETECTED
HPIV1 RNA ISLT QL NAA+PROBE: NOT DETECTED
HPIV2 RNA SPEC QL NAA+PROBE: NOT DETECTED
HPIV3 RNA NPH QL NAA+PROBE: NOT DETECTED
HPIV4 P GENE NPH QL NAA+PROBE: NOT DETECTED
IMM GRANULOCYTES # BLD AUTO: 0.14 10*3/MM3 (ref 0–0.05)
IMM GRANULOCYTES NFR BLD AUTO: 0.8 % (ref 0–0.5)
LYMPHOCYTES # BLD AUTO: 2.06 10*3/MM3 (ref 0.7–3.1)
LYMPHOCYTES NFR BLD AUTO: 12 % (ref 19.6–45.3)
M PNEUMO IGG SER IA-ACNC: NOT DETECTED
MCH RBC QN AUTO: 31.6 PG (ref 26.6–33)
MCHC RBC AUTO-ENTMCNC: 31 G/DL (ref 31.5–35.7)
MCV RBC AUTO: 102.2 FL (ref 79–97)
MONOCYTES # BLD AUTO: 1.11 10*3/MM3 (ref 0.1–0.9)
MONOCYTES NFR BLD AUTO: 6.5 % (ref 5–12)
NEUTROPHILS NFR BLD AUTO: 13.84 10*3/MM3 (ref 1.7–7)
NEUTROPHILS NFR BLD AUTO: 80.5 % (ref 42.7–76)
NRBC BLD AUTO-RTO: 0 /100 WBC (ref 0–0.2)
PLATELET # BLD AUTO: 266 10*3/MM3 (ref 140–450)
PMV BLD AUTO: 10 FL (ref 6–12)
POTASSIUM SERPL-SCNC: 5 MMOL/L (ref 3.5–5.2)
RBC # BLD AUTO: 4.52 10*6/MM3 (ref 4.14–5.8)
RHINOVIRUS RNA SPEC NAA+PROBE: NOT DETECTED
RSV RNA NPH QL NAA+NON-PROBE: DETECTED
SODIUM SERPL-SCNC: 133 MMOL/L (ref 136–145)
WBC NRBC COR # BLD: 17.18 10*3/MM3 (ref 3.4–10.8)

## 2021-12-01 PROCEDURE — 99233 SBSQ HOSP IP/OBS HIGH 50: CPT | Performed by: STUDENT IN AN ORGANIZED HEALTH CARE EDUCATION/TRAINING PROGRAM

## 2021-12-01 PROCEDURE — 25010000002 METHYLPREDNISOLONE PER 125 MG: Performed by: STUDENT IN AN ORGANIZED HEALTH CARE EDUCATION/TRAINING PROGRAM

## 2021-12-01 PROCEDURE — 25010000002 FUROSEMIDE PER 20 MG: Performed by: STUDENT IN AN ORGANIZED HEALTH CARE EDUCATION/TRAINING PROGRAM

## 2021-12-01 PROCEDURE — 25010000002 ENOXAPARIN PER 10 MG: Performed by: STUDENT IN AN ORGANIZED HEALTH CARE EDUCATION/TRAINING PROGRAM

## 2021-12-01 PROCEDURE — 94799 UNLISTED PULMONARY SVC/PX: CPT

## 2021-12-01 PROCEDURE — 82746 ASSAY OF FOLIC ACID SERUM: CPT | Performed by: PHYSICIAN ASSISTANT

## 2021-12-01 PROCEDURE — 85025 COMPLETE CBC W/AUTO DIFF WBC: CPT | Performed by: STUDENT IN AN ORGANIZED HEALTH CARE EDUCATION/TRAINING PROGRAM

## 2021-12-01 PROCEDURE — 94660 CPAP INITIATION&MGMT: CPT

## 2021-12-01 PROCEDURE — 25010000002 METHYLPREDNISOLONE PER 40 MG: Performed by: STUDENT IN AN ORGANIZED HEALTH CARE EDUCATION/TRAINING PROGRAM

## 2021-12-01 PROCEDURE — 25010000002 THIAMINE PER 100 MG: Performed by: STUDENT IN AN ORGANIZED HEALTH CARE EDUCATION/TRAINING PROGRAM

## 2021-12-01 PROCEDURE — 87633 RESP VIRUS 12-25 TARGETS: CPT | Performed by: STUDENT IN AN ORGANIZED HEALTH CARE EDUCATION/TRAINING PROGRAM

## 2021-12-01 PROCEDURE — 80048 BASIC METABOLIC PNL TOTAL CA: CPT | Performed by: STUDENT IN AN ORGANIZED HEALTH CARE EDUCATION/TRAINING PROGRAM

## 2021-12-01 RX ORDER — FUROSEMIDE 10 MG/ML
40 INJECTION INTRAMUSCULAR; INTRAVENOUS ONCE
Status: COMPLETED | OUTPATIENT
Start: 2021-12-01 | End: 2021-12-01

## 2021-12-01 RX ORDER — METHYLPREDNISOLONE SODIUM SUCCINATE 40 MG/ML
40 INJECTION, POWDER, LYOPHILIZED, FOR SOLUTION INTRAMUSCULAR; INTRAVENOUS EVERY 12 HOURS
Status: DISCONTINUED | OUTPATIENT
Start: 2021-12-01 | End: 2021-12-02

## 2021-12-01 RX ADMIN — ARFORMOTEROL TARTRATE 15 MCG: 15 SOLUTION RESPIRATORY (INHALATION) at 22:37

## 2021-12-01 RX ADMIN — BUDESONIDE 0.5 MG: 0.5 INHALANT RESPIRATORY (INHALATION) at 22:37

## 2021-12-01 RX ADMIN — IPRATROPIUM BROMIDE AND ALBUTEROL SULFATE 3 ML: .5; 3 SOLUTION RESPIRATORY (INHALATION) at 18:21

## 2021-12-01 RX ADMIN — METHYLPREDNISOLONE SODIUM SUCCINATE 40 MG: 40 INJECTION, POWDER, FOR SOLUTION INTRAMUSCULAR; INTRAVENOUS at 16:29

## 2021-12-01 RX ADMIN — METHYLPREDNISOLONE SODIUM SUCCINATE 60 MG: 125 INJECTION, POWDER, FOR SOLUTION INTRAMUSCULAR; INTRAVENOUS at 04:00

## 2021-12-01 RX ADMIN — SODIUM CHLORIDE, PRESERVATIVE FREE 10 ML: 5 INJECTION INTRAVENOUS at 08:01

## 2021-12-01 RX ADMIN — BUDESONIDE 0.5 MG: 0.5 INHALANT RESPIRATORY (INHALATION) at 10:08

## 2021-12-01 RX ADMIN — IPRATROPIUM BROMIDE AND ALBUTEROL SULFATE 3 ML: .5; 3 SOLUTION RESPIRATORY (INHALATION) at 06:20

## 2021-12-01 RX ADMIN — DOXYCYCLINE 100 MG: 100 INJECTION, POWDER, LYOPHILIZED, FOR SOLUTION INTRAVENOUS at 16:30

## 2021-12-01 RX ADMIN — ENOXAPARIN SODIUM 40 MG: 40 INJECTION SUBCUTANEOUS at 10:50

## 2021-12-01 RX ADMIN — ATORVASTATIN CALCIUM 40 MG: 40 TABLET, FILM COATED ORAL at 20:52

## 2021-12-01 RX ADMIN — FLUTICASONE PROPIONATE 1 SPRAY: 50 SPRAY, METERED NASAL at 08:00

## 2021-12-01 RX ADMIN — FUROSEMIDE 40 MG: 10 INJECTION INTRAMUSCULAR; INTRAVENOUS at 08:12

## 2021-12-01 RX ADMIN — DOXYCYCLINE 100 MG: 100 INJECTION, POWDER, LYOPHILIZED, FOR SOLUTION INTRAVENOUS at 04:00

## 2021-12-01 RX ADMIN — SODIUM CHLORIDE, PRESERVATIVE FREE 10 ML: 5 INJECTION INTRAVENOUS at 20:53

## 2021-12-01 RX ADMIN — METOPROLOL TARTRATE 25 MG: 25 TABLET, FILM COATED ORAL at 20:52

## 2021-12-01 RX ADMIN — METOPROLOL TARTRATE 25 MG: 25 TABLET, FILM COATED ORAL at 08:00

## 2021-12-01 RX ADMIN — THIAMINE HYDROCHLORIDE 100 ML/HR: 100 INJECTION, SOLUTION INTRAMUSCULAR; INTRAVENOUS at 08:01

## 2021-12-01 RX ADMIN — IPRATROPIUM BROMIDE AND ALBUTEROL SULFATE 3 ML: .5; 3 SOLUTION RESPIRATORY (INHALATION) at 23:38

## 2021-12-01 RX ADMIN — IPRATROPIUM BROMIDE AND ALBUTEROL SULFATE 3 ML: .5; 3 SOLUTION RESPIRATORY (INHALATION) at 14:18

## 2021-12-01 RX ADMIN — ARFORMOTEROL TARTRATE 15 MCG: 15 SOLUTION RESPIRATORY (INHALATION) at 10:08

## 2021-12-01 RX ADMIN — ASPIRIN 81 MG: 81 TABLET, COATED ORAL at 08:00

## 2021-12-01 RX ADMIN — PANTOPRAZOLE SODIUM 40 MG: 40 TABLET, DELAYED RELEASE ORAL at 08:00

## 2021-12-01 NOTE — CASE MANAGEMENT/SOCIAL WORK
Discharge Planning Assessment   Joaquim     Patient Name: Emilio Guy  MRN: [id number]  Today's Date: 12/1/2021    Admit Date: 11/30/2021     Discharge Needs Assessment     Row Name 12/01/21 1443       Living Environment    Lives With alone    Current Living Arrangements home/apartment/condo    Primary Care Provided by self    Provides Primary Care For no one    Family Caregiver if Needed none    Quality of Family Relationships helpful; involved; supportive    Able to Return to Prior Arrangements yes       Resource/Environmental Concerns    Transportation Concerns car, none       Transition Planning    Patient/Family Anticipates Transition to home    Transportation Anticipated family or friend will provide       Discharge Needs Assessment    Equipment Currently Used at Home oxygen               Discharge Plan     Row Name 12/01/21 1444       Plan    Plan SS spoke with Pt's mother, Barbie. Pt lives alone. Pt does not utilize any home health services. Pt utilizes O2. Pt's PCP is Valarie Garcia. Pt does not have a power of  or living will. Pt's mother was unsure of discharge plan and SS was unable to speak with Pt due to Pt being on bipap. SS will continue to follow and assist with discharge planning.    Patient/Family in Agreement with Plan yes              Expected Discharge Date and Time     Expected Discharge Date Expected Discharge Time    Dec 4, 2021          Demographic Summary     Row Name 12/01/21 1443       General Information    Admission Type inpatient    Referral Source nursing  SS received consult for DC planning pt with daily ETOH use on CIWA protocol.                    ELIS Szymanski

## 2021-12-01 NOTE — PAYOR COMM NOTE
"  [facility]  NPI: [id number]    Utilization Review   Contact:Diann Orr MSN, APRN, NP-C  Phone: [phone]  Fax: [phone]    steven bh/Attn: rena Alejandro Auth Req  REF:[id number]  Jack Guy (56 y.o. Male)             Date of Birth Social Security Number Address Home Phone MRN    1965  [address] [facility] [address] [phone] [id number]    [employer] Marital Status             None Single       Admission Date Admission Type Admitting Provider Attending Provider Department, Room/Bed    11/30/21 Emergency Thomas Darden DO Williams, Curtis Anthony, DO [facility] PROGRESS CARE, P212/S2    Discharge Date Discharge Disposition Discharge Destination                         Attending Provider: Thomas Darden DO    Allergies: Penicillins    Isolation: None   Infection: None   Code Status: CPR   Advance Care Planning Activity    Ht: 172.7 cm (68\")   Wt: 80.9 kg (178 lb 4.8 oz)    Admission Cmt: None   Principal Problem: None                Active Insurance as of 11/30/2021     Primary Coverage     Payor Plan Insurance Group Employer/Plan Group    [employer] [place] [facility] [place]      Payor Plan Address Payor Plan Phone Number Payor Plan Fax Number Effective Dates    [address] [address]   6/1/2015 - None Entered    [place] [address]       Subscriber Name Subscriber Birth Date Member ID       JACK GUY 1965 [id number]                 Emergency Contacts      (Rel.) Home Phone Work Phone Mobile Phone    No,Contact (Other) [phone] -- --    megha Guy (Mother) -- -- [phone]               History & Physical      Rubi Bella PA-C at 11/30/21 0746     Attestation signed by Thomas Darden DO at 11/30/21 2662    I have evaluated the patient independently, I have reviewed H&P, all labs and images from today and evaluated the patient at bedside.  I have discussed w/ RACHELLE Broussard and " agree.  56-year-old male with chronic respiratory failure due to COPD, wears 3 L at home came to ER with worsening shortness of breath.  CT angio chest negative for PE, no infiltrates on imaging.  ABG with hypercarbia consistent with COPD exacerbation.  He has poor aeration with significant wheezing on exam.  Will start high-dose steroids Solu-Medrol 60 mg every 8 hours, doxycycline, duo nebs, CIWA protocol for daily EtOH use, admit to PCU, okay to take off BiPAP and placed on nasal cannula to let him eat, BiPAP as needed and at bedtime                          [facility] Medicine Services  History & Physical    Patient Identification:  Name:  Emilio Guy  Age:  56 y.o.  Sex:  male  :  1965  MRN:  [id number]   Visit Number:  [id number]  Admit Date: 2021   Primary Care Physician:  Valarie Garcia APRN    Subjective     Chief complaint: Short of breath, wears 3LPM at home with home saturation of 80s upon EMS arrival    History of presenting illness:      Emilio Guy is a 56 y.o. male with past medical history significant for COPD, tobacco abuse, CHF, ETOH use.  Mr. Guy presented to the ED of [facility] via EMS after called for shortness of breath. He reportedly wears 3LPM of supplemental O2 at home.  When EMS arrived, his oxygen saturation on his home O2 requirement was reportedly in the 80s.      Upon arrival to the ED, vital signs were T 100F, pulse 128, RR 26, and /122 with oxygen saturation of 81% on 3LPM via NC.  Initial blood gas revealed pH of 7.280 with PO2 of 51.1 and PCO2 of 63.6 on FiO2 of 36.  He was started on BiPAP with repeat blood gas showing some improvement with pH of 7.308, PCO2 53 and PO2 of 92 on FiO2 of 40, 24/8 & rate of 24.  Labs demonstrated sodium of 131 with LFTs within normal limits.  Phosphorus is elevated at 4.8.  C-reactive protein was elevated at 9.40.  Blood cell count was found to be 11.8.  Ethanol level was negative.  CT chest per  PE protocol was negative for known pulmonary embolus and did not demonstrate known acute infiltrate.      Mr. Guy is evaluated in the ED wearing BiPAP.  He awakens and reports he has had worsening shortness of breath for a few days. He does wear 3LPM at home.  He denies chest pain. He reports some cough.  He reports he does drink alcohol a few times a month but has difficulty elaborating given BiPAP mask. He denies fever, chills.  He denies abdominal pain, dysuria, and hematuria.  He reports significant wheeze.        ---------------------------------------------------------------------------------------------------------------------   Review of Systems   Constitutional: Positive for activity change and fatigue.   HENT: Negative for congestion, drooling and mouth sores.    Eyes: Negative for pain and discharge.   Respiratory: Positive for cough, shortness of breath and wheezing.    Cardiovascular: Negative for chest pain and leg swelling.   Gastrointestinal: Negative for abdominal distention, diarrhea, nausea and vomiting.   Endocrine: Negative for cold intolerance and heat intolerance.   Genitourinary: Negative for difficulty urinating and dysuria.   Musculoskeletal: Negative for arthralgias and gait problem.   Skin: Negative for color change and pallor.   Allergic/Immunologic: Negative for environmental allergies and food allergies.   Neurological: Negative for dizziness and headaches.   Hematological: Negative for adenopathy. Does not bruise/bleed easily.   Psychiatric/Behavioral: Negative for agitation.        ---------------------------------------------------------------------------------------------------------------------   Past Medical History:   Diagnosis Date   • CHF (congestive heart failure) (HCC)    • COPD (chronic obstructive pulmonary disease) (HCC)      History reviewed. No pertinent surgical history.  Family History   Family history unknown: Yes     Social History     Socioeconomic History   •  Marital status: Single   Tobacco Use   • Smoking status: Current Every Day Smoker     Packs/day: 1.50     Years: 25.00     Pack years: 37.50   Substance and Sexual Activity   • Alcohol use: Yes     Alcohol/week: 12.0 standard drinks     Types: 12 Cans of beer per week   • Drug use: No   • Sexual activity: Defer     ---------------------------------------------------------------------------------------------------------------------   Allergies:  Penicillins  ---------------------------------------------------------------------------------------------------------------------   Home medications:    Medications below are reported home medications pulling from within the system; at this time, these medications have not been reconciled unless otherwise specified and are in the verification process for further verifcation as current home medications.  (Not in a hospital admission)      Hospital Scheduled Meds:          Current listed hospital scheduled medications may not yet reflect those currently placed in orders that are signed and held awaiting patient's arrival to floor.   ---------------------------------------------------------------------------------------------------------------------     Objective     Vital Signs:  Temp:  [100 °F (37.8 °C)] 100 °F (37.8 °C)  Heart Rate:  [] 80  Resp:  [26-28] 28  BP: ()/() 104/72      11/30/21  0310   Weight: 90.7 kg (200 lb)     Body mass index is 30.41 kg/m².  ---------------------------------------------------------------------------------------------------------------------       Physical Exam  Vitals and nursing note reviewed.   Constitutional:       General: He is awake.      Appearance: Normal appearance. He is well-developed.   HENT:      Head: Normocephalic and atraumatic.   Eyes:      General: Lids are normal.      Conjunctiva/sclera:      Right eye: Right conjunctiva is not injected.      Left eye: Left conjunctiva is not injected.   Cardiovascular:       Rate and Rhythm: Normal rate and regular rhythm.      Heart sounds: S1 normal and S2 normal.   Pulmonary:      Effort: No tachypnea, bradypnea or respiratory distress.      Breath sounds: Examination of the right-upper field reveals decreased breath sounds and wheezing. Examination of the left-upper field reveals decreased breath sounds and wheezing. Examination of the right-middle field reveals decreased breath sounds. Examination of the left-middle field reveals decreased breath sounds. Examination of the right-lower field reveals decreased breath sounds. Examination of the left-lower field reveals decreased breath sounds. Decreased breath sounds and wheezing present. No rhonchi or rales.   Abdominal:      General: Bowel sounds are normal.      Palpations: Abdomen is soft.      Tenderness: There is no abdominal tenderness.   Musculoskeletal:      Right forearm: No swelling or edema.      Left forearm: No swelling or edema.      Cervical back: Full passive range of motion without pain.      Right lower leg: No swelling. No edema.      Left lower leg: No swelling. No edema.   Skin:     General: Skin is warm and dry.   Neurological:      Mental Status: He is alert and oriented to person, place, and time.   Psychiatric:         Attention and Perception: Attention normal.         Mood and Affect: Mood normal.         Behavior: Behavior is cooperative.               ---------------------------------------------------------------------------------------------------------------------  EKG:                        ---------------------------------------------------------------------------------------------------------------------   Results from last 7 days   Lab Units 11/30/21  0349   CRP mg/dL 9.40*   LACTATE mmol/L 1.1   WBC 10*3/mm3 11.80*   HEMOGLOBIN g/dL 15.5   HEMATOCRIT % 48.7   MCV fL 97.4*   MCHC g/dL 31.8   PLATELETS 10*3/mm3 285   INR  0.92     Results from last 7 days   Lab Units 11/30/21  6425  11/30/21  0317   PH, ARTERIAL pH units 7.308* 7.280*   PO2 ART mm Hg 92.9 51.1*   PCO2, ARTERIAL mm Hg 53.0* 63.6*   HCO3 ART mmol/L 26.5* 29.9*     Results from last 7 days   Lab Units 11/30/21  0349   SODIUM mmol/L 131*   POTASSIUM mmol/L 5.2   MAGNESIUM mg/dL 2.4   CHLORIDE mmol/L 92*   CO2 mmol/L 25.3   BUN mg/dL 14   CREATININE mg/dL 0.88   EGFR IF NONAFRICN AM mL/min/1.73 90   CALCIUM mg/dL 9.3   GLUCOSE mg/dL 103*   ALBUMIN g/dL 4.88   BILIRUBIN mg/dL 0.3   ALK PHOS U/L 73   AST (SGOT) U/L 35   ALT (SGPT) U/L 21   Estimated Creatinine Clearance: 103.7 mL/min (by C-G formula based on SCr of 0.88 mg/dL).  No results found for: AMMONIA  Results from last 7 days   Lab Units 11/30/21  0551 11/30/21  0349   TROPONIN T ng/mL <0.010 <0.010     Results from last 7 days   Lab Units 11/30/21  0349   PROBNP pg/mL 144.6     No results found for: HGBA1C  No results found for: TSH, FREET4  No results found for: PREGTESTUR, PREGSERUM, HCG, HCGQUANT  Pain Management Panel     Pain Management Panel Latest Ref Rng & Units 12/30/2017    AMPHETAMINES SCREEN, URINE Negative Negative    BARBITURATES SCREEN Negative Negative    BENZODIAZEPINE SCREEN, URINE Negative Negative    BUPRENORPHINEUR Negative Negative    COCAINE SCREEN, URINE Negative Negative    METHADONE SCREEN, URINE Negative Negative        No results found for: BLOODCX  No results found for: URINECX  No results found for: WOUNDCX  No results found for: STOOLCX      ---------------------------------------------------------------------------------------------------------------------  Imaging Results (Last 7 Days)     Procedure Component Value Units Date/Time    CT Chest Pulmonary Embolism [[id number]] Collected: 11/30/21 0705     Updated: 11/30/21 0708    Narrative:      CT CHEST PULMONARY EMBOLISM W CONTRAST    INDICATION:   Shortness of air, evaluate for pulmonary embolus    TECHNIQUE:   CT angiogram of the chest with IV contrast. 3-D reconstructions were obtained and  reviewed.   Radiation dose reduction techniques included automated exposure control or exposure modulation based on body size. Count of known CT and cardiac nuc med studies  performed in previous 12 months: 0.     COMPARISON:   None available.    FINDINGS:   There is optimal opacification of the pulmonary arteries. There is no CT evidence of pulmonary embolus. Aorta is normal in appearance. There is no adenopathy. Thyroid gland is normal. Upper abdominal images are unremarkable. The lungs are clear. Bones  are unremarkable.          Impression:      1. Negative for pulmonary embolus.    2. No acute findings in the chest.    Signer Name: Edison Guerrero MD   Signed: 11/30/2021 7:05 AM   Workstation Name: [email or web address]    [facility]    XR Chest 1 View [[id number]] Collected: 11/30/21 0333     Updated: 11/30/21 0335    Narrative:      CR Chest 1 Vw    INDICATION:   Sepsis.     COMPARISON:    12/30/2017    FINDINGS:  Portable AP view(s) of the chest.  Cardiac and mediastinal contours are normal. Chronic changes in the lungs may be the result of COPD. Correlate with history. There is infiltrate or atelectasis in the lower lungs today. No pneumothorax is identified.      Impression:      Possible COPD. Infiltrate or atelectasis noted in both lung bases on the current study.    Signer Name: Samy Samaniego MD   Signed: 11/30/2021 3:33 AM   Workstation Name: [facility]    [facility]          Cultures:  No results found for: BLOODCX, URINECX, WOUNDCX, MRSACX, RESPCX, STOOLCX        I have personally reviewed the above radiology images and read the final radiology report on 11/30/21  ---------------------------------------------------------------------------------------------------------------------  Assessment / Plan     Active Hospital Problems    Diagnosis  POA   • COPD with acute exacerbation (HCC) [J44.1]  Yes       ASSESSMENT/PLAN:  -Acute hypoxemic and hypercarbic respiratory  failure superimposed on chronic hypoxic respiratory failure (3LPM O2 requirement at home):   -Acute COPD exacerbation:   -Acute respiratory acidosis:   • Continuous pulse oximetry monitoring.   • IV methylprednisolone 0.5mg/kg dosing BID per pharmacy.   • Duonebs on board.   • Pulmicort nebs on board.   • Brovana nebs on board.   • Prior arrangements for outpatient pulm with multiple no shows.   • Continuous pulse oximetry monitoring.   • Orders placed to titrate oxygen saturation greater than or equal to 90%.    • Respiratory PCR added.   • Antitussives added.   • No prior available PFTs for viewing within chart.   • CT chest per PE protocol without known PE or known acute infiltrate. Imaging reviewed as well as report.    • Received IV doxycycline and IV Rocephin in the ED.    -ETOH use:   · ETOH withdrawal protocol on board.   · IV banana bag on board.   · PRN ativan per non-monitored unit protocol on board.       -SIRS criteria with HR>90, RR>20 likely reflective of COPD exacerbation:   · CT chest without acute infiltrate.   · Obtain viral respiratory panel.  · Peripheral blood cultures obtained x2 and pending at present.   · UA unremarkable.     -Hyponatremia:   · Repeat AM BMP.     -Hx of CHF:   · CT chest without significant volume overload.   · Obtain echocardiogram.   · Does not appear acutely overloaded.     -Tobacco abuse:   · Cessation advised.     -Faint Macrocytosis possibly from ETOH use:   · Continue to monitor.   · Check TSH.   · Check folate as well given ETOH use.     ----------  -DVT prophylaxis: SQ Lovenox  -Activity: Bedrest while on BiPAP  -Expected length of stay: INPATIENT status due to the need for care which can only be reasonably provided in an hospital setting such as aggressive/expedited ancillary services and/or consultation services, the necessity for IV medications, close physician monitoring and/or the possible need for procedures.  In such, I feel patient’s risk for adverse  outcomes and need for care warrant INPATIENT evaluation and predict the patient’s care encounter to likely last beyond 2 midnights.  -Disposition: To be determined pending course of care    High risk secondary to acute hypoxic and hypercarbic on chronic hypoxemic respiratory failure, Acute COPD exacerbation, SIRS criteria has been met        Rubi Bella PA-C  11/30/21  09:16 EST  Pager # [phone]  ---------------------------------------------------------------------------------------------------------------------             Electronically signed by Thomas Darden DO at 11/30/21 1343          Emergency Department Notes      Linwood Fishman DO at 11/30/21 0514          Subjective     History provided by:  Patient   used: No    Shortness of Breath  Severity:  Severe  Onset quality:  Gradual  Timing:  Constant  Progression:  Worsening  Chronicity:  Chronic  Context: activity    Context: not animal exposure, not emotional upset, not fumes, not known allergens, not occupational exposure, not pollens, not smoke exposure, not strong odors, not URI and not weather changes    Relieved by:  Nothing  Worsened by:  Activity, deep breathing, exertion and movement  Ineffective treatments:  Inhaler, oxygen, rest and position changes  Associated symptoms: diaphoresis and wheezing    Associated symptoms: no abdominal pain, no chest pain, no claudication, no cough, no ear pain, no fever, no headaches, no hemoptysis, no neck pain, no PND, no rash, no sore throat, no sputum production, no syncope, no swollen glands and no vomiting    Risk factors: obesity    Risk factors: no recent alcohol use, no family hx of DVT, no hx of cancer, no hx of PE/DVT, no prolonged immobilization and no recent surgery        Review of Systems   Constitutional: Positive for diaphoresis. Negative for activity change, appetite change, chills, fatigue and fever.   HENT: Negative for congestion, ear pain and sore  throat.    Eyes: Negative for redness.   Respiratory: Positive for shortness of breath and wheezing. Negative for cough, hemoptysis, sputum production and chest tightness.    Cardiovascular: Negative for chest pain, palpitations, claudication, leg swelling, syncope and PND.   Gastrointestinal: Negative for abdominal pain, diarrhea, nausea and vomiting.   Genitourinary: Negative for dysuria and urgency.   Musculoskeletal: Negative for arthralgias, back pain, myalgias and neck pain.   Skin: Negative for pallor, rash and wound.   Neurological: Negative for dizziness, speech difficulty, weakness and headaches.   Psychiatric/Behavioral: Negative for agitation, behavioral problems, confusion and decreased concentration.   All other systems reviewed and are negative.      Past Medical History:   Diagnosis Date   • CHF (congestive heart failure) (CMS/[facility])    • COPD (chronic obstructive pulmonary disease) (CMS/[facility])        Allergies   Allergen Reactions   • Penicillins        No past surgical history on file.    No family history on file.    Social History     Socioeconomic History   • Marital status: Single   Tobacco Use   • Smoking status: Current Every Day Smoker     Packs/day: 1.50     Years: 25.00     Pack years: 37.50   Substance and Sexual Activity   • Alcohol use: Yes     Alcohol/week: 12.0 standard drinks     Types: 12 Cans of beer per week   • Drug use: No   • Sexual activity: Defer           Objective   Physical Exam  Vitals and nursing note reviewed.   Constitutional:       General: He is not in acute distress.     Appearance: Normal appearance. He is well-developed. He is obese. He is ill-appearing. He is not toxic-appearing or diaphoretic.   HENT:      Head: Normocephalic and atraumatic.      Right Ear: External ear normal.      Left Ear: External ear normal.      Nose: Nose normal.      Mouth/Throat:      Pharynx: No oropharyngeal exudate.      Tonsils: No tonsillar exudate.   Eyes:      General: Lids are  normal.      Conjunctiva/sclera: Conjunctivae normal.      Pupils: Pupils are equal, round, and reactive to light.   Neck:      Thyroid: No thyromegaly.   Cardiovascular:      Rate and Rhythm: Regular rhythm. Tachycardia present.      Pulses: Normal pulses.      Heart sounds: Normal heart sounds, S1 normal and S2 normal.   Pulmonary:      Effort: Tachypnea, accessory muscle usage and respiratory distress present.      Breath sounds: Examination of the right-upper field reveals decreased breath sounds and wheezing. Examination of the left-upper field reveals decreased breath sounds and wheezing. Examination of the right-middle field reveals decreased breath sounds and wheezing. Examination of the left-middle field reveals decreased breath sounds and wheezing. Examination of the right-lower field reveals decreased breath sounds and wheezing. Examination of the left-lower field reveals decreased breath sounds and wheezing. Decreased breath sounds and wheezing present. No rales.   Chest:      Chest wall: No tenderness.   Abdominal:      General: Bowel sounds are normal. There is no distension.      Palpations: Abdomen is soft.      Tenderness: There is no abdominal tenderness. There is no guarding or rebound.   Musculoskeletal:         General: No tenderness or deformity. Normal range of motion.      Cervical back: Full passive range of motion without pain, normal range of motion and neck supple.   Lymphadenopathy:      Cervical: No cervical adenopathy.   Skin:     General: Skin is warm and dry.      Coloration: Skin is not pale.      Findings: No erythema or rash.   Neurological:      Mental Status: He is alert and oriented to person, place, and time.      GCS: GCS eye subscore is 4. GCS verbal subscore is 5. GCS motor subscore is 6.      Cranial Nerves: No cranial nerve deficit.      Sensory: No sensory deficit.   Psychiatric:         Speech: Speech normal.         Behavior: Behavior normal.         Thought Content:  Thought content normal.         Judgment: Judgment normal.         Procedures          ED Course  ED Course as of 11/30/21 0746   e Nov 30, 2021   0434 XR Chest 1 View  IMPRESSION:  Possible COPD. Infiltrate or atelectasis noted in both lung bases on the current study. [ES]   0516 ECG 12 Lead  Sinus tachycardia  Left axis deviation  Possible Inferior infarct , age undetermined  Abnormal ECG  When compared with ECG of 30-NOV-2021 03:19, (Unconfirmed)  No significant change was found  Vent. rate 128 BPM  MD interval 144 ms  QRS duration 78 ms  QT/QTcB 314/458 ms  P-R-T axes 77 -80 59 [ES]   0619 Discussed case with on-call hospitalist Dr. Oleary whom recommended CT of chest with PE protocol prior to admission.  Will determine where the patient is admitted into the hospital. [ES]   9800 I assumed care of this patient at shift change.  Hospitalist team overnight requested CT PE protocol prior to accepting this patient to their PCU.  ABG noted improving hypercapnic respiratory failure.  CBC and CCP relatively unremarkable.  BNP and troponin are within normal limits.  Covid testing is negative.  CT PE study ruled out pulmonary embolism.  Severe COPD exacerbation noted.  Recommend admission for further work up and treatment.  Hospitalist team consulted and made aware of the patient.  Consults and orders placed per hospitalist request.  Patient was agreeable to admission plan.  Vitals stable on admission. [SF]      ED Course User Index  [ES] Mark Steen MD  [SF] Linwood Fishman, DO                                           MDM  Number of Diagnoses or Management Options  Acute on chronic respiratory failure with hypoxia and hypercapnia (HCC): new and requires workup  COPD with acute exacerbation (HCC): new and requires workup  Pneumonia of both lungs due to infectious organism, unspecified part of lung: new and requires workup     Amount and/or Complexity of Data Reviewed  Clinical lab tests: reviewed and  ordered  Tests in the radiology section of CPT®: reviewed and ordered  Tests in the medicine section of CPT®: ordered and reviewed  Review and summarize past medical records: yes  Discuss the patient with other providers: yes  Independent visualization of images, tracings, or specimens: yes    Risk of Complications, Morbidity, and/or Mortality  Presenting problems: high  Diagnostic procedures: high  Management options: high    Critical Care  Total time providing critical care: > 105 minutes    Patient Progress  Patient progress: other (comment) (CRITICAL)      Final diagnoses:   COPD with acute exacerbation (HCC)   Acute on chronic respiratory failure with hypoxia and hypercapnia (HCC)   Pneumonia of both lungs due to infectious organism, unspecified part of lung       ED Disposition  ED Disposition     ED Disposition Condition Comment    Decision to Admit            No follow-up provider specified.       Medication List      No changes were made to your prescriptions during this visit.          Linwood Fishman DO  11/30/21 0746      Electronically signed by Linwood Fishman DO at 11/30/21 0746       Oxygen Therapy (last day)     Date/Time SpO2 Device (Oxygen Therapy) Flow (L/min) Oxygen Concentration (%) ETCO2 (mmHg)    12/01/21 1008 97 NPPV/NIV -- 35 --    12/01/21 1003 96 NPPV/NIV -- -- --    12/01/21 0902 95 NPPV/NIV -- -- --    12/01/21 0802 93 NPPV/NIV -- -- --    12/01/21 0703 91 NPPV/NIV -- -- --    12/01/21 0620 95 NPPV/NIV -- 35 --    12/01/21 0602 87 -- -- -- --    12/01/21 0503 95 -- -- -- --    12/01/21 0402 95 -- -- -- --    12/01/21 0305 95 NPPV/NIV -- -- --    12/01/21 0302 91 -- -- -- --    12/01/21 0249 93 -- -- -- --    12/01/21 0242 94 nasal cannula 3 -- --    12/01/21 0103 95 -- -- -- --    12/01/21 0042 95 NPPV/NIV -- 35 --    12/01/21 0002 95 -- -- -- --    11/30/21 2302 95 -- -- -- --    11/30/21 2203 95 NPPV/NIV -- 35 --    11/30/21 2103 92 -- -- -- --    11/30/21 2003 97 -- -- -- --     11/30/21 2000 -- NPPV/NIV -- -- --    11/30/21 1902 94 -- -- -- --    11/30/21 1803 94 NPPV/NIV -- -- --    11/30/21 1751 94 NPPV/NIV -- 35 --    11/30/21 1702 92 NPPV/NIV -- -- --    11/30/21 1605 94 NPPV/NIV -- 35 --    11/30/21 1602 94 NPPV/NIV -- -- --    11/30/21 1549 93 -- -- -- --    11/30/21 1517 94 NPPV/NIV -- 35 --    11/30/21 1308 92 NPPV/NIV -- 35 --    11/30/21 1302 92 -- -- -- --    11/30/21 1300 -- NPPV/NIV -- 30 --    11/30/21 1218 92 NPPV/NIV -- 35 --    11/30/21 1111 90 -- -- -- --    11/30/21 1103 91 NPPV/NIV -- -- --    11/30/21 1020 -- NPPV/NIV -- -- --    11/30/21 1017 97 NPPV/NIV -- -- --    11/30/21 0957 -- -- -- 30 --    11/30/21 0917 94 -- -- -- --    11/30/21 0747 91 -- -- -- --    11/30/21 0739 91 -- -- -- --    11/30/21 0707 92 -- -- -- --    11/30/21 0645 92 -- -- -- --    11/30/21 0642 97 -- -- -- --    11/30/21 0641 91 -- -- -- --    11/30/21 0632 91 -- -- -- --    11/30/21 0626 93 NPPV/NIV -- 30 --    11/30/21 0624 92 -- -- -- --    11/30/21 0619 94 -- -- -- --    11/30/21 0617 90 -- -- -- --    11/30/21 0616 91 -- -- -- --    11/30/21 0615 91 -- -- -- --    11/30/21 0614 90 -- -- -- --    11/30/21 0604 92 -- -- -- --    11/30/21 0551 96 -- -- -- --    11/30/21 0547 92 -- -- -- --    11/30/21 0542 95 -- -- -- --    11/30/21 0541 92 -- -- -- --    11/30/21 0540 91 -- -- -- --    11/30/21 0539 92 -- -- -- --    11/30/21 0538 92 -- -- -- --    11/30/21 0532 92 -- -- -- --    11/30/21 0522 91 -- -- -- --    11/30/21 0520 93 -- -- -- --    11/30/21 0517 89 -- -- -- --    11/30/21 0501 92 -- -- -- --    11/30/21 0458 91 -- -- -- --    11/30/21 0457 91 -- -- -- --    11/30/21 0456 91 -- -- -- --    11/30/21 0450 98 -- -- -- --    11/30/21 0441 91 -- -- -- --    11/30/21 0422 -- -- -- 30 --    11/30/21 0419 94 -- -- -- --    11/30/21 0403 95 -- -- -- --    11/30/21 0353 97 -- -- -- --    11/30/21 0350 93 -- -- -- --    11/30/21 0347 90 -- -- -- --    11/30/21 0345 89 NPPV/NIV -- 40 --     11/30/21 0343 85 -- -- -- --    11/30/21 0338 86 -- -- -- --    11/30/21 0334 91 -- -- -- --    11/30/21 0327 93 nasal cannula 3 -- --    11/30/21 0321 84 nasal cannula 3 32 --    11/30/21 0318 84 -- -- -- --    11/30/21 0314 83 -- -- -- --    11/30/21 0310 81 nasal cannula 3 -- --          Ventilator/Non-Invasive Ventilation Settings (From admission, onward)             Start     Ordered    11/30/21 0428  NIPPV (CPAP or BIPAP)  Until Discontinued        Question Answer Comment   Indication: Acute Respiratory Failure    Type: BIPAP    IPAP 24    EPAP 8    Breath Rate 24    Titrate Oxygen for SpO2 90% - 95%        11/30/21 0427                  Scheduled Meds Sorted by Name  for Emilio Guy as of 11/29/21 through 12/1/21    1 Day 3 Days 7 Days 10 Days < Today >   Legend:                          Inactive     Active     Other Encounter     Linked                 Medications 11/29/21 11/30/21 12/01/21   acetaminophen (TYLENOL) tablet 1,000 mg  Dose: 1,000 mg  Freq: Once Route: PO  Start: 11/30/21 0320 End: 11/30/21 0321   Admin Instructions:   Do not exceed 4 grams of acetaminophen in a 24 hr period. Max dose of 2gm for AST/ALT greater than 120 units/L      If given for pain, use the following pain scale:   Mild Pain = Pain Score of 1-3, CPOT 1-2  Moderate Pain = Pain Score of 4-6, CPOT 3-4  Severe Pain = Pain Score of 7-10, CPOT 5-8          0321           arformoterol (BROVANA) nebulizer solution 15 mcg  Dose: 15 mcg  Freq: 2 Times Daily - RT Route: NEBULIZATION  Start: 11/30/21 1100   Admin Instructions:   Keep refrigerated.          (9197) [C]   [id number]         aspirin EC tablet 81 mg  Dose: 81 mg  Freq: Daily Route: PO  Start: 11/30/21 2000   Admin Instructions:   Herbal/drug interaction: Avoid use with ginkgo biloba. Do not crush or chew.  Do not exceed 4 grams of aspirin in a 24 hr period.    If given for pain, use the following pain scale:   Mild Pain = Pain Score of 1-3, CPOT 1-2  Moderate  Pain = Pain Score of 4-6, CPOT 3-4  Severe Pain = Pain Score of 7-10, CPOT 5-8          2106             0800          atorvastatin (LIPITOR) tablet 40 mg  Dose: 40 mg  Freq: Nightly Route: PO  Start: 11/30/21 2100   Admin Instructions:   Avoid grapefruit juice.          2106 2100          budesonide (PULMICORT) nebulizer solution 0.5 mg  Dose: 0.5 mg  Freq: 2 Times Daily - RT Route: NEBULIZATION  Start: 11/30/21 1100   Admin Instructions:   Do not shake.  Protect from light.          (9302) [C]   [id number]         cefTRIAXone (ROCEPHIN) 1 g in sodium chloride 0.9 % 100 mL IVPB-VTB  Dose: 1 g  Freq: Once Route: IV  Indications of Use: SEPSIS  Last Dose: Stopped (11/30/21 0413)  Start: 11/30/21 0319 End: 11/30/21 0413   Admin Instructions:   Caution: Look alike/sound alike drug alert          0350   0413          doxycycline (VIBRAMYCIN) 100 mg in sodium chloride 0.9 % 100 mL IVPB-VTB  Dose: 100 mg  Freq: Every 12 Hours Route: IV  Indications of Use: UPPER RESPIRATORY TRACT INFECTION  Start: 11/30/21 1600 End: 12/05/21 1559   Admin Instructions:   Protect from light.          1704 [id number]   1600         doxycycline (VIBRAMYCIN) 100 mg in sodium chloride 0.9 % 100 mL IVPB-VTB  Dose: 100 mg  Freq: Once Route: IV  Indications of Use: SEPSIS  Last Dose: Stopped (11/30/21 0647)  Start: 11/30/21 0319 End: 11/30/21 0647   Admin Instructions:   Protect from light.          0413   0647          enoxaparin (LOVENOX) syringe 40 mg  Dose: 40 mg  Freq: Every 24 Hours Route: SC  Indications of Use: PROPHYLAXIS OF VENOUS THROMBOEMBOLISM  Start: 11/30/21 1100   Admin Instructions:   Give subcutaneous in abdomen only. Do not massage site after injection.          1217             1050          fluticasone (FLONASE) 50 MCG/ACT nasal spray 1 spray  Dose: 1 spray  Freq: Daily Route: NA  Start: 11/30/21 2000 2106             0800          furosemide (LASIX) injection 40 mg  Dose: 40  mg  Freq: Once Route: IV  Start: 12/01/21 0845 End: 12/01/21 0812           0812          iopamidol (ISOVUE-370) 76 % injection 100 mL  Dose: 100 mL  Freq: Once in Imaging Route: IV  Start: 11/30/21 0657 End: 11/30/21 0655          0655           ipratropium-albuterol (DUO-NEB) nebulizer solution 3 mL  Dose: 3 mL  Freq: Every 4 Hours - RT Route: NEBULIZATION  Start: 11/30/21 1130          (1405) [C]   1517   1751     2342             (0242) [C]   0620        1500   1900   2300        ipratropium-albuterol (DUO-NEB) nebulizer solution 3 mL  Dose: 3 mL  Freq: Once Route: NEBULIZATION  Start: 11/30/21 0320 End: 11/30/21 0321          0321           ipratropium-albuterol (DUO-NEB) nebulizer solution 3 mL  Dose: 3 mL  Freq: Once Route: NEBULIZATION  Start: 11/30/21 0320 End: 11/30/21 0321          0321           LORazepam (ATIVAN) injection 0.25 mg  Dose: 0.25 mg  Freq: Once Route: IV  Start: 11/30/21 0359 End: 11/30/21 0402   Admin Instructions:    Caution: Look alike/sound alike drug alert. Dilute 1:1 with normal saline.          0402           methylPREDNISolone sodium succinate (SOLU-Medrol) injection 125 mg  Dose: 125 mg  Freq: Once Route: IV  Start: 11/30/21 0318 End: 11/30/21 0319   Admin Instructions:   Caution: Look alike/sound alike drug alert          0319           methylPREDNISolone sodium succinate (SOLU-Medrol) injection 40 mg  Dose: 40 mg  Freq: Every 12 Hours Route: IV  Start: 12/01/21 1600   Admin Instructions:   Caution: Look alike/sound alike drug alert           1600          methylPREDNISolone sodium succinate (SOLU-Medrol) injection 45.625 mg  Dose: 0.5 mg/kg  Weight Dosing Info: 90.7 kg  Freq: 2 Times Daily Route: IV  Start: 11/30/21 1100 End: 11/30/21 1011   Admin Instructions:   Caution: Look alike/sound alike drug alert          1011-D/C'd         methylPREDNISolone sodium succinate (SOLU-Medrol) injection 60 mg  Dose: 60 mg  Freq: Every 8 Hours Route: IV  Start: 11/30/21 1100 End: 12/01/21  "1028   Admin Instructions:   Caution: Look alike/sound alike drug alert          1217   1817            0400   1028-D/C'd       metoprolol tartrate (LOPRESSOR) injection 2.5 mg  Dose: 2.5 mg  Freq: Once Route: IV  Start: 11/30/21 0450 End: 11/30/21 0521          0509 [C]   0521          metoprolol tartrate (LOPRESSOR) tablet 25 mg  Dose: 25 mg  Freq: 2 Times Daily Route: PO  Start: 11/30/21 2100 2106             0800   2100         pantoprazole (PROTONIX) EC tablet 40 mg  Dose: 40 mg  Freq: Daily Route: PO  Start: 11/30/21 2000   Admin Instructions:   Swallow whole; do not crush, split, or chew.          2106 0800          sodium chloride 0.9 % bolus 1,000 mL  Dose: 1,000 mL  Freq: Once Route: IV  Last Dose: Stopped (11/30/21 0924)  Start: 11/30/21 0745 End: 11/30/21 0924          0752   0924          sodium chloride 0.9 % bolus 500 mL  Dose: 500 mL  Freq: Once Route: IV  Last Dose: Stopped (11/30/21 0648)  Start: 11/30/21 0527 End: 11/30/21 0648          0530   0648          sodium chloride 0.9 % flush 10 mL  Dose: 10 mL  Freq: Every 12 Hours Scheduled Route: IV  Start: 11/30/21 1100          1218   2107            0801   2100         thiamine (B-1) 100 mg, folic acid 1 mg in sodium chloride 0.9 % 1,000 mL infusion  Dose: 100 mL/hr  Freq: Daily Route: IV  Last Dose: 100 mL/hr (12/01/21 0801)  Start: 11/30/21 1100 End: 12/03/21 0859   Admin Instructions:   \"Banana bag\" or \"Rally Pack\".  Use if patient cannot take oral medications.          1217             0801          Medications 11/29/21 11/30/21 12/01/21           Continuous Meds Sorted by Name  for Emilio Guy as of 11/29/21 through 12/1/21  Legend:         Inactive     Active     Other Encounter     Linked                 Medications 11/29/21 11/30/21 12/01/21         PRN Meds Sorted by Name  for Emilio Guy as of 11/29/21 through 12/1/21  Legend:         Inactive     Active     Other Encounter     Linked                 Medications " 11/29/21 11/30/21 12/01/21   acetaminophen (TYLENOL) tablet 650 mg  Dose: 650 mg  Freq: Every 4 Hours PRN Route: PO  PRN Reason: Mild Pain   Start: 11/30/21 1007   Admin Instructions:   Do not exceed 4 grams of acetaminophen in a 24 hr period. Max dose of 2gm for AST/ALT greater than 120 units/L      If given for pain, use the following pain scale:   Mild Pain = Pain Score of 1-3, CPOT 1-2  Moderate Pain = Pain Score of 4-6, CPOT 3-4  Severe Pain = Pain Score of 7-10, CPOT 5-8         busPIRone (BUSPAR) tablet 7.5 mg  Dose: 7.5 mg  Freq: 2 Times Daily PRN Route: PO  PRN Comment: anxiety  Start: 11/30/21 1757   Admin Instructions:   Caution: Look alike/sound alike drug alert. Take with food.  Avoid grapefruit juice.         guaiFENesin-dextromethorphan (ROBITUSSIN DM) 100-10 MG/5ML syrup 10 mL  Dose: 10 mL  Freq: Every 6 Hours PRN Route: PO  PRN Reason: Cough  Start: 11/30/21 1007   Admin Instructions:   Caution: Look alike/sound alike drug alert         ipratropium-albuterol (DUO-NEB) nebulizer solution 3 mL  Dose: 3 mL  Freq: Every 4 Hours PRN Route: NEBULIZATION  PRN Reason: Shortness of Air  Start: 11/30/21 1007         LORazepam (ATIVAN) tablet 1 mg  Dose: 1 mg  Freq: Every 2 Hours PRN Route: PO  PRN Reason: Withdrawal  PRN Comment: For CIWA-Ar 8-10  Start: 11/30/21 1007 End: 12/07/21 1006   Admin Instructions:   Reassess 2 Hours After Administration   Caution: Look alike/sound alike drug alert                     Or  LORazepam (ATIVAN) injection 1 mg  Dose: 1 mg  Freq: Every 2 Hours PRN Route: IV  PRN Reason: Withdrawal  PRN Comment: For CIWA-Ar 8-10  Start: 11/30/21 1007 End: 12/07/21 1006   Admin Instructions:   Reassess 2 Hours After Administration   Caution: Look alike/sound alike drug alert. Dilute 1:1 with normal saline.          1047           Or  LORazepam (ATIVAN) tablet 2 mg  Dose: 2 mg  Freq: Every 1 Hour PRN Route: PO  PRN Reason: Withdrawal  PRN Comment: For Asha 11-15  Start: 11/30/21 1007 End:  12/07/21 1006   Admin Instructions:   Reassess 1 Hour After Administration   Caution: Look alike/sound alike drug alert                     Or  LORazepam (ATIVAN) injection 2 mg  Dose: 2 mg  Freq: Every 1 Hour PRN Route: IV  PRN Reason: Withdrawal  PRN Comment: For CIWA-Ar 11-15  Start: 11/30/21 1007 End: 12/07/21 1006   Admin Instructions:   Reassess 1 Hour After Administration   Caution: Look alike/sound alike drug alert. Dilute 1:1 with normal saline.                     Or  LORazepam (ATIVAN) injection 2 mg  Dose: 2 mg  Freq: Every 15 Minutes PRN Route: IV  PRN Reason: Anxiety  PRN Comment: For CIWA-Ar Greater Than 15.  Repeat Dose in 15 Minutes if CIWA-Ar Does Not Decrease  Start: 11/30/21 1007 End: 12/07/21 1006   Admin Instructions:   Reassess 15 Minutes After Each Administration.  If CIWA-Ar Does Not Decrease Contact Provider To Discuss Transfer to Higher Level of Care.   Caution: Look alike/sound alike drug alert. Dilute 1:1 with normal saline.                     Or  LORazepam (ATIVAN) injection 2 mg  Dose: 2 mg  Freq: Every 15 Minutes PRN Route: IM  PRN Reason: Withdrawal  PRN Comment: If Unable to Administer IV - For CIWA-Ar Greater Than 15.  Repeat Dose in 15 Minutes if CIWA-Ar Does Not Decrease  Start: 11/30/21 1007 End: 12/07/21 1006   Admin Instructions:   Reassess 15 Minutes After Each Administration.  If CIWA-Ar Does Not Decrease Contact Provider To Discuss Transfer to Higher Level of Care.   Caution: Look alike/sound alike drug alert. Dilute 1:1 with normal saline.                     nitroglycerin (NITROSTAT) SL tablet 0.4 mg  Dose: 0.4 mg  Freq: Every 5 Minutes PRN Route: SL  PRN Reason: Chest Pain  PRN Comment: Only if SBP Greater Than 100  Start: 11/30/21 1007   Admin Instructions:   If Pain Unrelieved After 3 Doses Notify MD         ondansetron (ZOFRAN) injection 4 mg  Dose: 4 mg  Freq: Every 6 Hours PRN Route: IV  PRN Reasons: Nausea,Vomiting  Start: 11/30/21 1007   Admin Instructions:   If  BOTH ondansetron (ZOFRAN) and promethazine (PHENERGAN) are ordered use ondansetron first and THEN promethazine IF ondansetron is ineffective.         sodium chloride 0.9 % flush 10 mL  Dose: 10 mL  Freq: As Needed Route: IV  PRN Reason: Line Care  Start: 11/30/21 1007         sodium chloride 0.9 % flush 10 mL  Dose: 10 mL  Freq: As Needed Route: IV  PRN Reason: Line Care  Start: 11/30/21 0315         Medications 11/29/21 11/30/21 12/01/21

## 2021-12-01 NOTE — PROGRESS NOTES
[facility] HOSPITALIST PROGRESS NOTE     Patient Identification:  Name:  Emilio Guy  Age:  56 y.o.  Sex:  male  :  1965  MRN:  [id number]  Visit Number:  [id number]  ROOM: [place] [address]     Primary Care Provider:  Valarie Garcia APRN    Length of stay in inpatient status:  1    Subjective     Chief Compliant:    Chief Complaint   Patient presents with   • Shortness of Breath       History of Presenting Illness:    Patient seen in follow-up for acute exacerbation of COPD.  He wear the BiPAP overnight, tolerated it well.  Was able to take BiPAP off this morning for about 30 minutes to an hour and eat breakfast.  Says he feels significantly better moving air better however still short of breath and feels like he needs to BiPAP.  Denies any significant sputum production, chest pain or other symptoms.  Has been afebrile.  No adverse events noted by nursing.    Objective     Current Hospital Meds:arformoterol, 15 mcg, Nebulization, BID - RT  aspirin, 81 mg, Oral, Daily  atorvastatin, 40 mg, Oral, Nightly  budesonide, 0.5 mg, Nebulization, BID - RT  doxycycline, 100 mg, Intravenous, Q12H  enoxaparin, 40 mg, Subcutaneous, Q24H  fluticasone, 1 spray, Nasal, Daily  ipratropium-albuterol, 3 mL, Nebulization, Q4H - RT  methylPREDNISolone sodium succinate, 40 mg, Intravenous, Q12H  metoprolol tartrate, 25 mg, Oral, BID  pantoprazole, 40 mg, Oral, Daily  sodium chloride, 10 mL, Intravenous, Q12H  IV Fluids 1000 mL + additives, 100 mL/hr, Intravenous, Daily         Current Antimicrobial Therapy:  Anti-Infectives (From admission, onward)    Ordered     Dose/Rate Route Frequency Start Stop    21 1342  doxycycline (VIBRAMYCIN) 100 mg in sodium chloride 0.9 % 100 mL IVPB-VTB        Ordering Provider: Thomas Darden DO    100 mg  over 60 Minutes Intravenous Every 12 Hours 21 1600 21 1559    21 0317  cefTRIAXone (ROCEPHIN) 1 g in sodium chloride 0.9 % 100 mL IVPB-VTB         Ordering Provider: Mark Steen MD    1 g  200 mL/hr over 30 Minutes Intravenous Once 11/30/21 0319 11/30/21 0413    11/30/21 0317  doxycycline (VIBRAMYCIN) 100 mg in sodium chloride 0.9 % 100 mL IVPB-VTB        Ordering Provider: Mark Steen MD    100 mg  over 60 Minutes Intravenous Once 11/30/21 0319 11/30/21 0647        Current Diuretic Therapy:  Diuretics (From admission, onward)    Ordered     Dose/Rate Route Frequency Start Stop    12/01/21 0745  furosemide (LASIX) injection 40 mg        Ordering Provider: Thomas Darden,     40 mg Intravenous Once 12/01/21 0845 12/01/21 0812        ----------------------------------------------------------------------------------------------------------------------  Vital Signs:  Heart Rate:  [65-96] 89  Resp:  [18-29] 26  BP: ()/() 128/96  SpO2:  [87 %-97 %] 93 %  on  Flow (L/min):  [3] 3;   Device (Oxygen Therapy): NPPV/NIV  Body mass index is 27.11 kg/m².    Wt Readings from Last 3 Encounters:   12/01/21 80.9 kg (178 lb 4.8 oz)   12/30/17 68 kg (150 lb)     Intake & Output (last 3 days)       11/28 0701 11/29 0700 11/29 0701 11/30 0700 11/30 0701 12/01 0700 12/01 0701 12/02 0700    P.O.    0    I.V. (mL/kg)   555 (6.9)     IV Piggyback   1000     Total Intake(mL/kg)   1555 (19.2) 0 (0)    Urine (mL/kg/hr)   1750 (0.9) 1625 (3.5)    Total Output   1750 1625    Net   -195 -1625                Diet Regular; Cardiac  ----------------------------------------------------------------------------------------------------------------------  Physical exam:  Constitutional: Older male, nontoxic appearing, sitting on side of the bed wearing BiPAP, NAD  HENT:  Head:  Normocephalic and atraumatic.  Mouth:  Moist mucous membranes.    Eyes:  Conjunctivae and EOM are normal. No scleral icterus.   Cardiovascular:  Normal rate, regular rhythm and normal heart sounds with no murmur. No JVD.   Pulmonary/Chest: Improved air movement  today versus yesterday, still significant wheezing noted  Abdominal:  Soft. No distension and no tenderness.  Bowel sounds present. No rebound or guarding.   Musculoskeletal:  No tenderness, and no deformity.  No red or swollen joints anywhere.    Neurological:  Alert and oriented to person, place, and time.  No cranial nerve deficit.   Nonfocal.   Skin:  Skin is warm and dry. No rash noted. No pallor.   Peripheral vascular:  No clubbing, no cyanosis, no edema. Pedal and tibial pulses 2/4 bilaterally.   ----------------------------------------------------------------------------------------------------------------------  Results from last 7 days   Lab Units 12/01/21  0913 11/30/21  0349   CRP mg/dL  --  9.40*   LACTATE mmol/L  --  1.1   WBC 10*3/mm3 17.18* 11.80*   HEMOGLOBIN g/dL 14.3 15.5   HEMATOCRIT % 46.2 48.7   MCV fL 102.2* 97.4*   MCHC g/dL 31.0* 31.8   PLATELETS 10*3/mm3 266 285   INR   --  0.92     Results from last 7 days   Lab Units 11/30/21  0421   PH, ARTERIAL pH units 7.308*   PO2 ART mm Hg 92.9   PCO2, ARTERIAL mm Hg 53.0*   HCO3 ART mmol/L 26.5*     Results from last 7 days   Lab Units 12/01/21 0913 11/30/21  0349   SODIUM mmol/L 133* 131*   POTASSIUM mmol/L 5.0 5.2   MAGNESIUM mg/dL  --  2.4   CHLORIDE mmol/L 101 92*   CO2 mmol/L 20.4* 25.3   BUN mg/dL 25* 14   CREATININE mg/dL 0.72* 0.88   EGFR IF NONAFRICN AM mL/min/1.73 113 90   CALCIUM mg/dL 9.1 9.3   PHOSPHORUS mg/dL  --  4.8*   GLUCOSE mg/dL 148* 103*   ALBUMIN g/dL  --  4.88   BILIRUBIN mg/dL  --  0.3   ALK PHOS U/L  --  73   AST (SGOT) U/L  --  35   ALT (SGPT) U/L  --  21   Estimated Creatinine Clearance: 132.6 mL/min (A) (by C-G formula based on SCr of 0.72 mg/dL (L)).  No results found for: AMMONIA  Results from last 7 days   Lab Units 11/30/21  0551 11/30/21  0349   TROPONIN T ng/mL <0.010 <0.010     Results from last 7 days   Lab Units 11/30/21  0349   PROBNP pg/mL 144.6         No results found for: HGBA1C, POCGLU  Lab Results    Component Value Date    TSH 0.855 11/30/2021     No results found for: PREGTESTUR, PREGSERUM, HCG, HCGQUANT  Pain Management Panel     Pain Management Panel Latest Ref Rng & Units 12/30/2017    AMPHETAMINES SCREEN, URINE Negative Negative    BARBITURATES SCREEN Negative Negative    BENZODIAZEPINE SCREEN, URINE Negative Negative    BUPRENORPHINEUR Negative Negative    COCAINE SCREEN, URINE Negative Negative    METHADONE SCREEN, URINE Negative Negative        Brief Urine Lab Results     None        Blood Culture   Date Value Ref Range Status   11/30/2021 No growth at 24 hours  Preliminary   11/30/2021 No growth at 24 hours  Preliminary     No results found for: URINECX  No results found for: WOUNDCX  No results found for: STOOLCX  No results found for: RESPCX  No results found for: AFBCX  Results from last 7 days   Lab Units 11/30/21  0349   PROCALCITONIN ng/mL 0.17   LACTATE mmol/L 1.1   CRP mg/dL 9.40*       I have personally looked at the labs and they are summarized above.  ----------------------------------------------------------------------------------------------------------------------  Detailed radiology reports for the last 24 hours:  Imaging Results (Last 24 Hours)     ** No results found for the last 24 hours. **        Assessment & Plan      Patient is a 56-year-old male with history significant for chronic respiratory failure on 3 L nasal cannula at home due to COPD who presented with worsening shortness of breath and diagnosed with an acute exacerbation of COPD.    #Acute on chronic hypoxemic and hypercarbic respiratory failure  #Acute exacerbation of COPD  #RSV positive  --Patient wears 3 L nasal cannula at home, presenting with worsening shortness of breath and has significant wheezing and poor aeration on exam.  COVID-19 negative.  Acute exacerbation complicated by positive RSV on respiratory panel  --Blood cultures NGTD, unable to produce sputum, procalcitonin negative  --CT angio negative for  PE, no infiltrates noted  --Echocardiogram noted preserved systolic function, mild diastolic dysfunction  --Continue bronchodilators, Symbicort  --will decrease Solu-Medrol to 40 mg every 12h, continue doxycycline  --Continue to monitor in PCU, titrate FiO2 as tolerable, plan to wear BiPAP at bedtime and as needed, titrate SPO2 to greater than 88%    #Heart failure with preserved ejection fraction, not in acute exacerbation  --Echo per above.  Does not appear overtly overloaded on exam, will give IV Lasix x1, monitor urine output    #Leukocytosis, multifactorial secondary to reactive versus steroid-induced    #Chronic alcohol use  #Macrocytosis  --Drinks 3-4 beers per day.  No history of withdrawal.  Continue CIWA, thiamine, multivitamin    #Ongoing tobacco abuse, cessation recommended    CHECKLIST:  Abx: Doxycycline  Steroids: Solu-Medrol 40 mg every 12  VTE: Lovenox  GI ppx: PPI  Diet: Regular  Code: CPR, full  Dispo: Patient remains high risk due to acute on chronic hypoxic hypercarbic respiratory failure due to COPD and RSV positive.  Is for greater than 2 midnight stay.  Disposition expected Home when medically stable    Thomas Darden DO  [facility]  12/01/21  12:46 EST

## 2021-12-01 NOTE — PLAN OF CARE
Problem: Adult Inpatient Plan of Care  Goal: Plan of Care Review  12/1/2021 1547 by Magalis Mart RN  Outcome: Ongoing, Progressing  Flowsheets  Taken 12/1/2021 1547 by Magalis Mart RN  Progress: improving  Outcome Summary: VSS. Patient is on bipap and only tolerates being off it in short intervals. Continuing IV steroids. No issues reported. Call light within reach.  Taken 12/1/2021 0338 by Kisha Georges RN  Plan of Care Reviewed With: patient  12/1/2021 1547 by Magalis Mart RN  Outcome: Ongoing, Progressing  Flowsheets (Taken 12/1/2021 1547)  Progress: improving  Outcome Summary: VSS. Patient is on bipap and only tolerates being off it in short intervals. Continuing IV steroids. No issues reported. Call light within reach.  Goal: Patient-Specific Goal (Individualized)  12/1/2021 1547 by Magalis Mart RN  Outcome: Ongoing, Progressing  12/1/2021 1547 by Magalis Mart RN  Outcome: Ongoing, Progressing  Goal: Absence of Hospital-Acquired Illness or Injury  12/1/2021 1547 by Magalis Mart RN  Outcome: Ongoing, Progressing  12/1/2021 1547 by Magalis Mart RN  Outcome: Ongoing, Progressing  Intervention: Identify and Manage Fall Risk  Recent Flowsheet Documentation  Taken 12/1/2021 1500 by Magalis Mart RN  Safety Promotion/Fall Prevention: safety round/check completed  Taken 12/1/2021 1300 by Magalis Mart RN  Safety Promotion/Fall Prevention: safety round/check completed  Taken 12/1/2021 1100 by Magalis Mart RN  Safety Promotion/Fall Prevention: safety round/check completed  Taken 12/1/2021 0900 by Magalis Mart RN  Safety Promotion/Fall Prevention: safety round/check completed  Taken 12/1/2021 0745 by Magalis Mart RN  Safety Promotion/Fall Prevention: safety round/check completed  Taken 12/1/2021 0700 by Magalis Mart RN  Safety Promotion/Fall Prevention: safety round/check completed  Intervention: Prevent Infection  Recent Flowsheet Documentation  Taken 12/1/2021 1500 by Magalis Mart  RN  Infection Prevention:   rest/sleep promoted   single patient room provided  Taken 12/1/2021 1300 by Magalis Mart RN  Infection Prevention:   rest/sleep promoted   single patient room provided  Taken 12/1/2021 1100 by Magalis Mart RN  Infection Prevention:   rest/sleep promoted   single patient room provided  Taken 12/1/2021 0900 by Magalis Mart RN  Infection Prevention:   rest/sleep promoted   single patient room provided  Taken 12/1/2021 0700 by Magalis Mart RN  Infection Prevention:   rest/sleep promoted   single patient room provided  Goal: Optimal Comfort and Wellbeing  12/1/2021 1547 by Magalis Mart RN  Outcome: Ongoing, Progressing  12/1/2021 1547 by Magalis Mart RN  Outcome: Ongoing, Progressing  Intervention: Provide Person-Centered Care  Recent Flowsheet Documentation  Taken 12/1/2021 1430 by Magalis Mart RN  Trust Relationship/Rapport:   care explained   choices provided   emotional support provided   empathic listening provided   questions answered   questions encouraged   thoughts/feelings acknowledged   reassurance provided  Taken 12/1/2021 0745 by Magalis aMrt RN  Trust Relationship/Rapport:   care explained   choices provided   emotional support provided   empathic listening provided   questions answered   questions encouraged   reassurance provided   thoughts/feelings acknowledged  Goal: Readiness for Transition of Care  12/1/2021 1547 by Magalis Mart RN  Outcome: Ongoing, Progressing  12/1/2021 1547 by Magalis Mart RN  Outcome: Ongoing, Progressing     Problem: Fall Injury Risk  Goal: Absence of Fall and Fall-Related Injury  12/1/2021 1547 by Magalis Mart RN  Outcome: Ongoing, Progressing  12/1/2021 1547 by Magalis Mart RN  Outcome: Ongoing, Progressing  Intervention: Identify and Manage Contributors to Fall Injury Risk  Recent Flowsheet Documentation  Taken 12/1/2021 1500 by Magalis Mart RN  Medication Review/Management: medications reviewed  Taken 12/1/2021 1300 by  Magalis Mart RN  Medication Review/Management: medications reviewed  Taken 12/1/2021 1100 by Magalis Mart RN  Medication Review/Management: medications reviewed  Taken 12/1/2021 0900 by Magalis Mart RN  Medication Review/Management: medications reviewed  Taken 12/1/2021 0745 by Magalis Mart RN  Medication Review/Management: medications reviewed  Taken 12/1/2021 0700 by Magalis Mart RN  Medication Review/Management: medications reviewed  Intervention: Promote Injury-Free Environment  Recent Flowsheet Documentation  Taken 12/1/2021 1500 by Magalis Mart RN  Safety Promotion/Fall Prevention: safety round/check completed  Taken 12/1/2021 1300 by Magalis Mart RN  Safety Promotion/Fall Prevention: safety round/check completed  Taken 12/1/2021 1100 by Magalis Mart RN  Safety Promotion/Fall Prevention: safety round/check completed  Taken 12/1/2021 0900 by Magalis Mart RN  Safety Promotion/Fall Prevention: safety round/check completed  Taken 12/1/2021 0745 by Magalis Mart RN  Safety Promotion/Fall Prevention: safety round/check completed  Taken 12/1/2021 0700 by Magalis Mart RN  Safety Promotion/Fall Prevention: safety round/check completed     Problem: Respiratory Compromise COPD (Chronic Obstructive Pulmonary Disease)  Goal: Effective Oxygenation and Ventilation  Outcome: Ongoing, Progressing  Intervention: Promote Airway Secretion Clearance  Recent Flowsheet Documentation  Taken 12/1/2021 0745 by Magalis Mart RN  Cough And Deep Breathing: done independently per patient   Goal Outcome Evaluation:  Plan of Care Reviewed With: patient        Progress: improving  Outcome Summary: VSS. Patient is on bipap and only tolerates being off it in short intervals. Continuing IV steroids. No issues reported. Call light within reach.

## 2021-12-01 NOTE — PLAN OF CARE
Goal Outcome Evaluation:  Plan of Care Reviewed With: patient  VSS. Pt alert and oriented x 4. Pt remains on continuous bipap. Placed pt on 6L HFNC so he could eat and take meds and he tolerated well for approx 30 minutes. Pt enouraged to call out for needs. Call light within reach. Will continue to monitor.

## 2021-12-02 LAB
ANION GAP SERPL CALCULATED.3IONS-SCNC: 8.1 MMOL/L (ref 5–15)
BASOPHILS # BLD AUTO: 0.02 10*3/MM3 (ref 0–0.2)
BASOPHILS NFR BLD AUTO: 0.2 % (ref 0–1.5)
BUN SERPL-MCNC: 22 MG/DL (ref 6–20)
BUN/CREAT SERPL: 37.3 (ref 7–25)
CALCIUM SPEC-SCNC: 9 MG/DL (ref 8.6–10.5)
CHLORIDE SERPL-SCNC: 102 MMOL/L (ref 98–107)
CO2 SERPL-SCNC: 27.9 MMOL/L (ref 22–29)
CREAT SERPL-MCNC: 0.59 MG/DL (ref 0.76–1.27)
DEPRECATED RDW RBC AUTO: 46.5 FL (ref 37–54)
EOSINOPHIL # BLD AUTO: 0.1 10*3/MM3 (ref 0–0.4)
EOSINOPHIL NFR BLD AUTO: 0.8 % (ref 0.3–6.2)
ERYTHROCYTE [DISTWIDTH] IN BLOOD BY AUTOMATED COUNT: 12.7 % (ref 12.3–15.4)
GFR SERPL CREATININE-BSD FRML MDRD: 143 ML/MIN/1.73
GLUCOSE SERPL-MCNC: 123 MG/DL (ref 65–99)
HCT VFR BLD AUTO: 42.5 % (ref 37.5–51)
HGB BLD-MCNC: 13.6 G/DL (ref 13–17.7)
IMM GRANULOCYTES # BLD AUTO: 0.11 10*3/MM3 (ref 0–0.05)
IMM GRANULOCYTES NFR BLD AUTO: 0.8 % (ref 0–0.5)
LYMPHOCYTES # BLD AUTO: 1.43 10*3/MM3 (ref 0.7–3.1)
LYMPHOCYTES NFR BLD AUTO: 11 % (ref 19.6–45.3)
MCH RBC QN AUTO: 31.6 PG (ref 26.6–33)
MCHC RBC AUTO-ENTMCNC: 32 G/DL (ref 31.5–35.7)
MCV RBC AUTO: 98.8 FL (ref 79–97)
MONOCYTES # BLD AUTO: 0.69 10*3/MM3 (ref 0.1–0.9)
MONOCYTES NFR BLD AUTO: 5.3 % (ref 5–12)
NEUTROPHILS NFR BLD AUTO: 10.62 10*3/MM3 (ref 1.7–7)
NEUTROPHILS NFR BLD AUTO: 81.9 % (ref 42.7–76)
NRBC BLD AUTO-RTO: 0 /100 WBC (ref 0–0.2)
PLATELET # BLD AUTO: 286 10*3/MM3 (ref 140–450)
PMV BLD AUTO: 11.1 FL (ref 6–12)
POTASSIUM SERPL-SCNC: 5.4 MMOL/L (ref 3.5–5.2)
RBC # BLD AUTO: 4.3 10*6/MM3 (ref 4.14–5.8)
SODIUM SERPL-SCNC: 138 MMOL/L (ref 136–145)
WBC NRBC COR # BLD: 12.97 10*3/MM3 (ref 3.4–10.8)

## 2021-12-02 PROCEDURE — 25010000002 ENOXAPARIN PER 10 MG: Performed by: STUDENT IN AN ORGANIZED HEALTH CARE EDUCATION/TRAINING PROGRAM

## 2021-12-02 PROCEDURE — 99233 SBSQ HOSP IP/OBS HIGH 50: CPT | Performed by: STUDENT IN AN ORGANIZED HEALTH CARE EDUCATION/TRAINING PROGRAM

## 2021-12-02 PROCEDURE — 94799 UNLISTED PULMONARY SVC/PX: CPT

## 2021-12-02 PROCEDURE — 25010000002 THIAMINE PER 100 MG: Performed by: STUDENT IN AN ORGANIZED HEALTH CARE EDUCATION/TRAINING PROGRAM

## 2021-12-02 PROCEDURE — 94760 N-INVAS EAR/PLS OXIMETRY 1: CPT

## 2021-12-02 PROCEDURE — 25010000002 MAGNESIUM SULFATE 2 GM/50ML SOLUTION: Performed by: STUDENT IN AN ORGANIZED HEALTH CARE EDUCATION/TRAINING PROGRAM

## 2021-12-02 PROCEDURE — 85025 COMPLETE CBC W/AUTO DIFF WBC: CPT | Performed by: STUDENT IN AN ORGANIZED HEALTH CARE EDUCATION/TRAINING PROGRAM

## 2021-12-02 PROCEDURE — 80048 BASIC METABOLIC PNL TOTAL CA: CPT | Performed by: STUDENT IN AN ORGANIZED HEALTH CARE EDUCATION/TRAINING PROGRAM

## 2021-12-02 PROCEDURE — 25010000002 METHYLPREDNISOLONE PER 40 MG: Performed by: STUDENT IN AN ORGANIZED HEALTH CARE EDUCATION/TRAINING PROGRAM

## 2021-12-02 RX ORDER — MAGNESIUM SULFATE HEPTAHYDRATE 40 MG/ML
2 INJECTION, SOLUTION INTRAVENOUS ONCE
Status: COMPLETED | OUTPATIENT
Start: 2021-12-02 | End: 2021-12-02

## 2021-12-02 RX ORDER — HYDROXYZINE HYDROCHLORIDE 25 MG/1
25 TABLET, FILM COATED ORAL 3 TIMES DAILY PRN
Status: DISCONTINUED | OUTPATIENT
Start: 2021-12-02 | End: 2021-12-05 | Stop reason: HOSPADM

## 2021-12-02 RX ORDER — METHYLPREDNISOLONE SODIUM SUCCINATE 40 MG/ML
40 INJECTION, POWDER, LYOPHILIZED, FOR SOLUTION INTRAMUSCULAR; INTRAVENOUS EVERY 8 HOURS
Status: DISCONTINUED | OUTPATIENT
Start: 2021-12-02 | End: 2021-12-02

## 2021-12-02 RX ORDER — METHYLPREDNISOLONE SODIUM SUCCINATE 40 MG/ML
40 INJECTION, POWDER, LYOPHILIZED, FOR SOLUTION INTRAMUSCULAR; INTRAVENOUS EVERY 12 HOURS
Status: DISCONTINUED | OUTPATIENT
Start: 2021-12-03 | End: 2021-12-03

## 2021-12-02 RX ORDER — AZITHROMYCIN 250 MG/1
250 TABLET, FILM COATED ORAL DAILY
Status: DISCONTINUED | OUTPATIENT
Start: 2021-12-02 | End: 2021-12-05 | Stop reason: HOSPADM

## 2021-12-02 RX ADMIN — IPRATROPIUM BROMIDE AND ALBUTEROL SULFATE 3 ML: .5; 3 SOLUTION RESPIRATORY (INHALATION) at 03:06

## 2021-12-02 RX ADMIN — IPRATROPIUM BROMIDE AND ALBUTEROL SULFATE 3 ML: .5; 3 SOLUTION RESPIRATORY (INHALATION) at 18:31

## 2021-12-02 RX ADMIN — METHYLPREDNISOLONE SODIUM SUCCINATE 40 MG: 40 INJECTION, POWDER, FOR SOLUTION INTRAMUSCULAR; INTRAVENOUS at 12:02

## 2021-12-02 RX ADMIN — IPRATROPIUM BROMIDE AND ALBUTEROL SULFATE 3 ML: .5; 3 SOLUTION RESPIRATORY (INHALATION) at 07:30

## 2021-12-02 RX ADMIN — BUDESONIDE 0.5 MG: 0.5 INHALANT RESPIRATORY (INHALATION) at 22:11

## 2021-12-02 RX ADMIN — METHYLPREDNISOLONE SODIUM SUCCINATE 40 MG: 40 INJECTION, POWDER, FOR SOLUTION INTRAMUSCULAR; INTRAVENOUS at 04:16

## 2021-12-02 RX ADMIN — ASPIRIN 81 MG: 81 TABLET, COATED ORAL at 08:42

## 2021-12-02 RX ADMIN — ATORVASTATIN CALCIUM 40 MG: 40 TABLET, FILM COATED ORAL at 23:35

## 2021-12-02 RX ADMIN — METOPROLOL TARTRATE 25 MG: 25 TABLET, FILM COATED ORAL at 23:34

## 2021-12-02 RX ADMIN — METOPROLOL TARTRATE 25 MG: 25 TABLET, FILM COATED ORAL at 08:42

## 2021-12-02 RX ADMIN — FLUTICASONE PROPIONATE 1 SPRAY: 50 SPRAY, METERED NASAL at 08:42

## 2021-12-02 RX ADMIN — PANTOPRAZOLE SODIUM 40 MG: 40 TABLET, DELAYED RELEASE ORAL at 08:42

## 2021-12-02 RX ADMIN — ARFORMOTEROL TARTRATE 15 MCG: 15 SOLUTION RESPIRATORY (INHALATION) at 22:11

## 2021-12-02 RX ADMIN — BUDESONIDE 0.5 MG: 0.5 INHALANT RESPIRATORY (INHALATION) at 11:08

## 2021-12-02 RX ADMIN — AZITHROMYCIN 250 MG: 250 TABLET, FILM COATED ORAL at 19:57

## 2021-12-02 RX ADMIN — ARFORMOTEROL TARTRATE 15 MCG: 15 SOLUTION RESPIRATORY (INHALATION) at 11:08

## 2021-12-02 RX ADMIN — MAGNESIUM SULFATE HEPTAHYDRATE 2 G: 40 INJECTION, SOLUTION INTRAVENOUS at 12:02

## 2021-12-02 RX ADMIN — SODIUM CHLORIDE, PRESERVATIVE FREE 10 ML: 5 INJECTION INTRAVENOUS at 08:42

## 2021-12-02 RX ADMIN — SODIUM CHLORIDE, PRESERVATIVE FREE 10 ML: 5 INJECTION INTRAVENOUS at 23:35

## 2021-12-02 RX ADMIN — METHYLPREDNISOLONE SODIUM SUCCINATE 40 MG: 40 INJECTION, POWDER, FOR SOLUTION INTRAMUSCULAR; INTRAVENOUS at 23:35

## 2021-12-02 RX ADMIN — ENOXAPARIN SODIUM 40 MG: 40 INJECTION SUBCUTANEOUS at 12:02

## 2021-12-02 RX ADMIN — IPRATROPIUM BROMIDE AND ALBUTEROL SULFATE 3 ML: .5; 3 SOLUTION RESPIRATORY (INHALATION) at 15:09

## 2021-12-02 RX ADMIN — THIAMINE HYDROCHLORIDE 100 ML/HR: 100 INJECTION, SOLUTION INTRAMUSCULAR; INTRAVENOUS at 08:42

## 2021-12-02 RX ADMIN — DOXYCYCLINE 100 MG: 100 INJECTION, POWDER, LYOPHILIZED, FOR SOLUTION INTRAVENOUS at 04:11

## 2021-12-02 NOTE — PLAN OF CARE
Goal Outcome Evaluation:    VSS. Pt alert and oriented. Patient is on bipap and only tolerates being off it in short intervals. Call light within reach. No complaints at this time. Call light in reach. Will continue to monitor.

## 2021-12-02 NOTE — CONSULTS
Patient did not wear a mask due to bi-pap. Patient is a a 56 year old male  male with past medical history significant for COPD, tobacco abuse, CHF, ETOH use.   Length of diagnosis 2010  What stage have you been told you are in Stage IV  Shortness of breath? Yes  Do you have sleep apnea? no  Do you use a C-PAP or Bi-PAP? No   Do you have a cough? No  Have you had any recent weight loss? no  Do you use inhalers/ emergency inhaler and how often? Yes. How Often: as prescribed  Do you have a nebulizer machine and how often do you use? Yes. How Often: as prescribed  Do you smoke? smoker  (1 ppd x 30 yrs)  How many pillows do you use? Patient states sleeps elevated in recliner.   Barriers to learning? No  Materials provided? COPD book  Last PFT/when/where? unknown  Do you follow a pulmonologist? no  Do you get short of breath on normal activity? yes  Home Oxygen 3 Liters  Last hospital stay for COPD? unknown  COPD Zone? Green/yellow/red Yellow      Vitals:    12/02/21 1108   BP:    Pulse: 66   Resp: (!) 29   Temp:    SpO2: 96%          11/30/21  1008 12/01/21  0503 12/02/21  0500   Weight: 80.9 kg (178 lb 5.6 oz) 80.9 kg (178 lb 4.8 oz) 81.1 kg (178 lb 12.8 oz)    Patient received education from the COPD book and stated that at this time not interested in home COPD program but was educated on how to let [facility] know if he becomes interested at home. Patient stated he had no questions at this time. If any questions or concerns please re-consult or call. Thank you.

## 2021-12-02 NOTE — CONSULTS
Pulmonary Consultation     Patient Name: Emilio Guy  Age/Sex: 56 y.o. male  : 1965  MRN: [id number]    Date of Admission: 2021  Date of Encounter Visit: 21  Encounter Provider: Tracey Leggett MD  Referring Provider: No Known Provider  Place of Service: [facility]  Patient Care Team:  Valarie Garcia APRN as PCP - General (Family Medicine)      Subjective:     Consulted for: COPD exacerbation and noninvasive ventilation management    Chief Complaint: Patient came in with worsening shortness of breath    History of Present Illness:  Emilio Guy is a 56 y.o. male with history of COPD  with chronic hypoxemia on home oxygen at 3 L/min who presented to the ER with progressively worsening shortness of breath of few days duration.  Patient had a CT of the chest that was negative for any pulmonary embolism or any focal pulmonary infiltrate.  Was admitted and started on IV antibiotic with doxycycline and IV steroids with addition to the nebulized DuoNeb.  He had hypercapnic respiratory failure which was acute, patient was treated with the BiPAP with significant improvement.  He is a daily alcohol consumer 3-4 beers per day and was covered with a CIWA protocol but has no significant alcohol withdrawal symptoms.  Patient likes the BiPAP and would like to see if that something he can use at home.  He is still wheezing but he is better than earlier  He did have some lower extremity edema which did improve according to the patient compared to earlier.  He is currently using the BiPAP only at night and he is doing well with the oxygen during the daytime  Patient is currently on Solu-Medrol 40 IV every 8 hours which can be started to be weaned  His respiratory panel came back positive for RSV virus which probably is the trigger for his acute bronchitis and the treatment is basically supportive.  No fever or chills  No GI or  complaints  His PCO2 in position was 53 with a pH of 7.308 suggestive  of acute hypercapnic respiratory failure, probably his baseline PCO2 is only mildly elevated in the mid 40s.      Pulmonary Functions Testing Results:    No results found for: FEV1, FVC, ABK6IWJ, TLC, DLCO    Review of Systems:   Review of Systems   Constitutional: Positive for activity change and fatigue.   HENT: Negative for congestion, drooling and mouth sores.    Eyes: Negative for pain and discharge.   Respiratory: Positive for cough, shortness of breath and wheezing.    Cardiovascular: Negative for chest pain and leg swelling.   Gastrointestinal: Negative for abdominal distention, diarrhea, nausea and vomiting.   Endocrine: Negative for cold intolerance and heat intolerance.   Genitourinary: Negative for difficulty urinating and dysuria.   Musculoskeletal: Negative for arthralgias and gait problem.   Skin: Negative for color change and pallor.   Allergic/Immunologic: Negative for environmental allergies and food allergies.   Neurological: Negative for dizziness and headaches.   Hematological: Negative for adenopathy. Does not bruise/bleed easily.   Psychiatric/Behavioral: Negative for agitation.   Past Medical History:  Past Medical History:   Diagnosis Date   • CHF (congestive heart failure) ([facility])    • COPD (chronic obstructive pulmonary disease) ([facility])        History reviewed. No pertinent surgical history.    Home Medications:   Medications Prior to Admission   Medication Sig Dispense Refill Last Dose   • atorvastatin (LIPITOR) 10 MG tablet Take 10 mg by mouth Daily.   Past Month at Unknown time   • busPIRone (BUSPAR) 7.5 MG tablet Take 7.5 mg by mouth 2 (Two) Times a Day As Needed (anxiety).   11/29/2021 at Unknown time   • fluticasone (FLONASE) 50 MCG/ACT nasal spray 1 spray into the nostril(s) as directed by provider Daily.   11/29/2021 at Unknown time   • ipratropium (ATROVENT HFA) 17 MCG/ACT inhaler Inhale 2 puffs Every 4 (Four) Hours As Needed for Wheezing.   11/29/2021 at Unknown time   •  ipratropium-albuterol (DUO-NEB) 0.5-2.5 mg/3 ml nebulizer Take 3 mL by nebulization Every 4 (Four) Hours As Needed for Wheezing.   11/29/2021 at Unknown time   • metoprolol tartrate (LOPRESSOR) 25 MG tablet Take 25 mg by mouth 2 (Two) Times a Day.   11/29/2021 at Unknown time   • pantoprazole (PROTONIX) 40 MG EC tablet Take 40 mg by mouth Daily.   11/29/2021 at Unknown time       Inpatient Medications:  Scheduled Meds:arformoterol, 15 mcg, Nebulization, BID - RT  aspirin, 81 mg, Oral, Daily  atorvastatin, 40 mg, Oral, Nightly  budesonide, 0.5 mg, Nebulization, BID - RT  doxycycline, 100 mg, Intravenous, Q12H  enoxaparin, 40 mg, Subcutaneous, Q24H  fluticasone, 1 spray, Nasal, Daily  ipratropium-albuterol, 3 mL, Nebulization, Q4H - RT  methylPREDNISolone sodium succinate, 40 mg, Intravenous, Q8H  metoprolol tartrate, 25 mg, Oral, BID  pantoprazole, 40 mg, Oral, Daily  sodium chloride, 10 mL, Intravenous, Q12H      Continuous Infusions:   PRN Meds:.•  acetaminophen  •  busPIRone  •  guaiFENesin-dextromethorphan  •  hydrOXYzine  •  ipratropium-albuterol  •  LORazepam **OR** LORazepam **OR** LORazepam **OR** LORazepam **OR** LORazepam **OR** LORazepam  •  nitroglycerin  •  ondansetron  •  sodium chloride  •  sodium chloride    Allergies:  Allergies   Allergen Reactions   • Penicillins        Past Social History:  Social History     Socioeconomic History   • Marital status: Single   Tobacco Use   • Smoking status: Current Every Day Smoker     Packs/day: 1.50     Years: 25.00     Pack years: 37.50   • Smokeless tobacco: Never Used   Substance and Sexual Activity   • Alcohol use: Yes     Comment: drinks 1-2 beers everyday    • Drug use: No   • Sexual activity: Defer       Past Family History:  Family History   Family history unknown: Yes           Objective:   Temp:  [97.3 °F (36.3 °C)-97.6 °F (36.4 °C)] 97.3 °F (36.3 °C)  Heart Rate:  [56-85] 64  Resp:  [13-29] 26  BP: (103-144)/(66-99) 131/82  SpO2:  [91 %-100 %] 91 %   on  Flow (L/min):  [6] 6 Device (Oxygen Therapy): NPPV/NIV     Intake/Output Summary (Last 24 hours) at 12/2/2021 1658  Last data filed at 12/2/2021 1600  Gross per 24 hour   Intake 2019 ml   Output 2700 ml   Net -681 ml     Body mass index is 27.19 kg/m².      11/30/21  1008 12/01/21  0503 12/02/21  0500   Weight: 80.9 kg (178 lb 5.6 oz) 80.9 kg (178 lb 4.8 oz) 81.1 kg (178 lb 12.8 oz)     Weight change: 0.203 kg (7.2 oz)    Physical Exam:   Physical Exam   General:   And occasionally using some accessory muscles sickly looking gentleman but awake pleasant and oriented x4, sitting on side of bed, talking in short sentences,                   Head:    Normocephalic, atraumatic. External ears and nose are normal   Eyes:          Conjunctivae and sclerae normal, no icterus, PERRLA, no  discharge   Throat:   No oral lesions, no thrush, oral mucosa moist.    Neck:   Supple, trachea midline. No JVD, no cervical or supraclavicular lymphadenopathy    Lungs:     Normal chest on inspection, on nasal cannula oxygen, using accessory muscles, talking in short sentences, positive expiratory wheezes, no evidence of crackles .      Heart:    Regular rhythm and normal rate.  No murmurs, gallops, or rubs noted.   Abdomen:     Soft, nontender, nondistended, positive bowel sounds. No hepatospleenomegaly    Extremities:   No clubbing, cyanosis, trace pedal/ankle edema which is an improvement according to patient when compared to earlier presentation .     Pulses:   Pulses palpable and equal bilaterally.    Skin:   No bleeding or rash. No bumps, good turgor pressure    Neuro:   Nonfocal.  Moves all extremities well. Strength 5/5 and symmetrical, no sensory deficit    Psychiatric:   Normal mood and affect.     Lab Review:   Results from last 7 days   Lab Units 12/02/21  1027 12/01/21  0913 11/30/21  0349   SODIUM mmol/L 138 133* 131*   POTASSIUM mmol/L 5.4* 5.0 5.2   CHLORIDE mmol/L 102 101 92*   CO2 mmol/L 27.9 20.4* 25.3   BUN mg/dL  22* 25* 14   CREATININE mg/dL 0.59* 0.72* 0.88   GLUCOSE mg/dL 123* 148* 103*   CALCIUM mg/dL 9.0 9.1 9.3   AST (SGOT) U/L  --   --  35   ALT (SGPT) U/L  --   --  21   ALBUMIN g/dL  --   --  4.88     Results from last 7 days   Lab Units 11/30/21  0551 11/30/21  0349   TROPONIN T ng/mL <0.010 <0.010     Results from last 7 days   Lab Units 12/02/21  0802 12/01/21  0913 12/01/21  0913 11/30/21 0349 11/30/21 0349   WBC 10*3/mm3 12.97*  --  17.18*  --  11.80*   HEMOGLOBIN g/dL 13.6  --  14.3  --  15.5   HEMATOCRIT % 42.5  --  46.2  --  48.7   PLATELETS 10*3/mm3 286  --  266  --  285   MCV fL 98.8*   < > 102.2*   < > 97.4*   MCH pg 31.6   < > 31.6   < > 31.0   MCHC g/dL 32.0   < > 31.0*   < > 31.8   RDW % 12.7   < > 12.7   < > 12.5   RDW-SD fl 46.5   < > 48.7   < > 45.2   MPV fL 11.1   < > 10.0   < > 10.1   NEUTROPHIL % % 81.9*   < > 80.5*   < > 77.8*   LYMPHOCYTE % % 11.0*   < > 12.0*   < > 14.9*   MONOCYTES % % 5.3   < > 6.5   < > 6.3   EOSINOPHIL % % 0.8   < > 0.1*   < > 0.0*   BASOPHIL % % 0.2   < > 0.1   < > 0.5   IMM GRAN % % 0.8*   < > 0.8*   < > 0.5   NEUTROS ABS 10*3/mm3 10.62*   < > 13.84*   < > 9.18*   LYMPHS ABS 10*3/mm3 1.43   < > 2.06   < > 1.76   MONOS ABS 10*3/mm3 0.69   < > 1.11*   < > 0.74   EOS ABS 10*3/mm3 0.10   < > 0.01   < > 0.00   BASOS ABS 10*3/mm3 0.02   < > 0.02   < > 0.06   IMMATURE GRANS (ABS) 10*3/mm3 0.11*   < > 0.14*   < > 0.06*   NRBC /100 WBC 0.0   < > 0.0   < > 0.0    < > = values in this interval not displayed.     Results from last 7 days   Lab Units 11/30/21  0349   INR  0.92   APTT seconds 32.1     Results from last 7 days   Lab Units 11/30/21  0349   MAGNESIUM mg/dL 2.4           Invalid input(s): LDLCALC  Results from last 7 days   Lab Units 11/30/21  0349   PROBNP pg/mL 144.6     Results from last 7 days   Lab Units 11/30/21  0551   TSH uIU/mL 0.855     Results from last 7 days   Lab Units 11/30/21  0421   PH, ARTERIAL pH units 7.308*   PCO2, ARTERIAL mm Hg 53.0*   PO2 ART mm  Hg 92.9   HCO3 ART mmol/L 26.5*     Results from last 7 days   Lab Units 11/30/21  0349   PROCALCITONIN ng/mL 0.17   LACTATE mmol/L 1.1     Results from last 7 days   Lab Units 11/30/21  0349   CRP mg/dL 9.40*         Results from last 7 days   Lab Units 11/30/21  0349   BLOODCX  No growth at 2 days  No growth at 2 days         Results from last 7 days   Lab Units 12/01/21  0151 11/30/21  0351 11/30/21  0351   COVID19   --   --  Not Detected   ADENOVIRUS DETECTION BY PCR  Not Detected  --   --    CORONAVIRUS 229E  Not Detected  --   --    CORONAVIRUS HKU1  Not Detected  --   --    CORONAVIRUS NL63  Not Detected  --   --    CORONAVIRUS OC43  Not Detected  --   --    HUMAN METAPNEUMOVIRUS  Not Detected  --   --    HUMAN RHINOVIRUS/ENTEROVIRUS  Not Detected  --   --    INFLUENZA B PCR  Not Detected   < > Not Detected   PARAINFLUENZA 1  Not Detected  --   --    PARAINFLUENZA VIRUS 2  Not Detected  --   --    PARAINFLUENZA VIRUS 3  Not Detected  --   --    PARAINFLUENZA VIRUS 4  Not Detected  --   --    BORDETELLA PERTUSSIS PCR  Not Detected  --   --    BORDETELLA PARAPERTUSSIS PCR  Not Detected  --   --    CHLAMYDOPHILA PNEUMONIAE PCR  Not Detected  --   --    MYCOPLAMA PNEUMO PCR  Not Detected  --   --    RSV, PCR  Detected*  --   --     < > = values in this interval not displayed.             Imaging:  Imaging Results (Most Recent)     Procedure Component Value Units Date/Time    CT Chest Pulmonary Embolism [[id number]] Collected: 11/30/21 0705     Updated: 11/30/21 0708    Narrative:      CT CHEST PULMONARY EMBOLISM W CONTRAST    INDICATION:   Shortness of air, evaluate for pulmonary embolus    TECHNIQUE:   CT angiogram of the chest with IV contrast. 3-D reconstructions were obtained and reviewed.   Radiation dose reduction techniques included automated exposure control or exposure modulation based on body size. Count of known CT and cardiac nuc med studies  performed in previous 12 months: 0.     COMPARISON:   None  available.    FINDINGS:   There is optimal opacification of the pulmonary arteries. There is no CT evidence of pulmonary embolus. Aorta is normal in appearance. There is no adenopathy. Thyroid gland is normal. Upper abdominal images are unremarkable. The lungs are clear. Bones  are unremarkable.          Impression:      1. Negative for pulmonary embolus.    2. No acute findings in the chest.    Signer Name: Edison Guerrero MD   Signed: 11/30/2021 7:05 AM   [facility]    [facility]    XR Chest 1 View [[id number]] Collected: 11/30/21 0333     Updated: 11/30/21 0335    Narrative:      CR Chest 1 Vw    INDICATION:   Sepsis.     COMPARISON:    12/30/2017    FINDINGS:  Portable AP view(s) of the chest.  Cardiac and mediastinal contours are normal. Chronic changes in the lungs may be the result of COPD. Correlate with history. There is infiltrate or atelectasis in the lower lungs today. No pneumothorax is identified.      Impression:      Possible COPD. Infiltrate or atelectasis noted in both lung bases on the current study.    Signer Name: Samy Samaniego MD   Signed: 11/30/2021 3:33 AM   Workstation Name: [facility]    [facility]          I personally viewed and interpreted the patient's imaging studies.    Assessment:     1. Acute exacerbation of COPD  2. Acute on chronic hypoxemic and hypercapnic respiratory failure  3. Diastolic congestive heart failure  4. Chronic alcohol use  5. RSV bronchitis  6. Mild hyperkalemia  7. Hyponatremia, improved    Plan:     CT film reviewed, there is no evidence of pneumonia.  Patient has positive RSV on the respiratory panel which is known trigger for significant bronchospasm.  Agree with the primary team on the systemic steroids and bronchodilator therapy, doxycycline does have some anti-inflammatory effect but will transition to oral since no need for the IV dose at this point, a good oral regimen to be maintained on  the long-term will be daily dosing of 250 mg of Zithromax, this will have a long sustained anti-inflammatory effect that has been proven to reduce the risk of recurrent bronchitis.  BiPAP for respiratory support may transition to nasal cannula oxygen as tolerated, his ABG showed acute CO2 retention and this cannot be used as a justification for home noninvasive positive pressure ventilation, patient needs to have an ABG while in a compensated state and if his CO2 remained persistently elevated above 50 then would be a potential candidate for home NIPPV  Patient also would benefit from long-acting bronchodilator as part of his maintenance therapy as an outpatient, and the recommendation would be combination of LAMA/LABA with as needed albuterol on top of that.  Depending on his insurance coverage may consider trying Anoro ellipta or Stiolto Respimat, or nebulized regimen (yupelri and Brovana)     Summary of recommendations:  · Daily Zithromax, for long-term  · Need to have maintenance therapy with long-acting bronchodilator after discharge  · Decrease the dose of IV steroid but continue with the IV form  · Discontinue the IV doxycycline  · Use the BiPAP as needed, cannot qualify for home noninvasive positive pressure ventilation, need to have an ABG showing significant CO2 retention while in a compensated state        Thank you for allowing me to participate in the care of Emilio Guy. Feel free to contact me directly with any further questions or concerns.    Tracey Leggett MD  [facility]   12/02/21  16:58 EST    Dictated utilizing Dragon dictation

## 2021-12-02 NOTE — CASE MANAGEMENT/SOCIAL WORK
Discharge Planning Assessment   Joaquim     Patient Name: Emilio Guy  MRN: [id number]  Today's Date: 12/2/2021    Admit Date: 11/30/2021     Discharge Plan     Row Name 12/02/21 1451       Plan    Plan Pt admitted 11/30/21. Pt lives alone and will likely return home when medically stable. Pt does not utilize home health services. Pt utilizes home oxygen. Per CM, pt is requesting home bipap. Pt is currently on bipap at 35%. SS to continue to follow and assist.              GARY Austin

## 2021-12-02 NOTE — PROGRESS NOTES
[facility] HOSPITALIST PROGRESS NOTE     Patient Identification:  Name:  Emilio Guy  Age:  56 y.o.  Sex:  male  :  1965  MRN:  [id number]  Visit Number:  [id number]  ROOM: [place] [address]     Primary Care Provider:  Valarie Garcia APRN    Length of stay in inpatient status:  2    Subjective     Chief Compliant:    Chief Complaint   Patient presents with   • Shortness of Breath       History of Presenting Illness:    Patient seen in follow-up for acute exacerbation of COPD.  He wear the BiPAP overnight and states he feels better this morning.  Still has significant shortness of breath requiring BiPAP for work of breathing throughout today.  Has been able to tolerate nasal cannula for a longer amount of time plan him to eat and drink.  Requests home BiPAP machine.  Denies any significant sputum production, chest pain or other symptoms.  Has been afebrile.  No adverse events noted by nursing.    Objective     Current Hospital Meds:arformoterol, 15 mcg, Nebulization, BID - RT  aspirin, 81 mg, Oral, Daily  atorvastatin, 40 mg, Oral, Nightly  budesonide, 0.5 mg, Nebulization, BID - RT  doxycycline, 100 mg, Intravenous, Q12H  enoxaparin, 40 mg, Subcutaneous, Q24H  fluticasone, 1 spray, Nasal, Daily  ipratropium-albuterol, 3 mL, Nebulization, Q4H - RT  magnesium sulfate, 2 g, Intravenous, Once  methylPREDNISolone sodium succinate, 40 mg, Intravenous, Q8H  metoprolol tartrate, 25 mg, Oral, BID  pantoprazole, 40 mg, Oral, Daily  sodium chloride, 10 mL, Intravenous, Q12H         Current Antimicrobial Therapy:  Anti-Infectives (From admission, onward)    Ordered     Dose/Rate Route Frequency Start Stop    21 1342  doxycycline (VIBRAMYCIN) 100 mg in sodium chloride 0.9 % 100 mL IVPB-VTB        Ordering Provider: Thomas Darden DO    100 mg  over 60 Minutes Intravenous Every 12 Hours 21 1600 21 1559    21 0317  cefTRIAXone (ROCEPHIN) 1 g in sodium chloride 0.9 % 100  mL IVPB-VTB        Ordering Provider: Mark Steen MD    1 g  200 mL/hr over 30 Minutes Intravenous Once 11/30/21 0319 11/30/21 0413    11/30/21 0317  doxycycline (VIBRAMYCIN) 100 mg in sodium chloride 0.9 % 100 mL IVPB-VTB        Ordering Provider: Mark Steen MD    100 mg  over 60 Minutes Intravenous Once 11/30/21 0319 11/30/21 0647        Current Diuretic Therapy:  Diuretics (From admission, onward)    Ordered     Dose/Rate Route Frequency Start Stop    12/01/21 0745  furosemide (LASIX) injection 40 mg        Ordering Provider: Thomas Darden DO    40 mg Intravenous Once 12/01/21 0845 12/01/21 0812        ----------------------------------------------------------------------------------------------------------------------  Vital Signs:  Temp:  [97.4 °F (36.3 °C)-97.8 °F (36.6 °C)] 97.4 °F (36.3 °C)  Heart Rate:  [56-85] 66  Resp:  [13-29] 29  BP: (103-144)/(65-99) 137/99  SpO2:  [91 %-100 %] 96 %  on  Flow (L/min):  [6] 6;   Device (Oxygen Therapy): NPPV/NIV  Body mass index is 27.19 kg/m².    Wt Readings from Last 3 Encounters:   12/02/21 81.1 kg (178 lb 12.8 oz)   12/30/17 68 kg (150 lb)     Intake & Output (last 3 days)       11/29 0701 11/30 0700 11/30 0701 12/01 0700 12/01 0701 12/02 0700 12/02 0701 12/03 0700    P.O.   720 360    I.V. (mL/kg)  555 (6.9) 959 (11.8)     IV Piggyback  1000 100     Total Intake(mL/kg)  1555 (19.2) 1779 (21.9) 360 (4.4)    Urine (mL/kg/hr)  1750 (0.9) 2075 (1.1) 800 (1.7)    Total Output  1750 2075 800    Net  -285 -729 -938                Diet Regular; Cardiac  ----------------------------------------------------------------------------------------------------------------------  Physical exam:  Constitutional: Older male, nontoxic appearing, sitting on side of the bed on NC, NAD  HENT:  Head:  Normocephalic and atraumatic.  Mouth:  Moist mucous membranes.    Eyes:  Conjunctivae and EOM are normal. No scleral icterus.   Cardiovascular:   Normal rate, regular rhythm and normal heart sounds with no murmur. No JVD.   Pulmonary/Chest: better than admission but still has significant wheezing noted and bronchospastic  Abdominal:  Soft. No distension and no tenderness.  Bowel sounds present. No rebound or guarding.   Musculoskeletal:  No tenderness, and no deformity.  No red or swollen joints anywhere.    Neurological:  Alert and oriented to person, place, and time.  No cranial nerve deficit.   Nonfocal.   Skin:  Skin is warm and dry. No rash noted. No pallor.   Peripheral vascular:  No clubbing, no cyanosis, no edema. Pedal and tibial pulses 2/4 bilaterally.   ----------------------------------------------------------------------------------------------------------------------  Results from last 7 days   Lab Units 12/02/21  0802 12/01/21  0913 11/30/21  0349   CRP mg/dL  --   --  9.40*   LACTATE mmol/L  --   --  1.1   WBC 10*3/mm3 12.97* 17.18* 11.80*   HEMOGLOBIN g/dL 13.6 14.3 15.5   HEMATOCRIT % 42.5 46.2 48.7   MCV fL 98.8* 102.2* 97.4*   MCHC g/dL 32.0 31.0* 31.8   PLATELETS 10*3/mm3 286 266 285   INR   --   --  0.92     Results from last 7 days   Lab Units 11/30/21  0421   PH, ARTERIAL pH units 7.308*   PO2 ART mm Hg 92.9   PCO2, ARTERIAL mm Hg 53.0*   HCO3 ART mmol/L 26.5*     Results from last 7 days   Lab Units 12/02/21  1027 12/01/21  0913 11/30/21  0349   SODIUM mmol/L 138 133* 131*   POTASSIUM mmol/L 5.4* 5.0 5.2   MAGNESIUM mg/dL  --   --  2.4   CHLORIDE mmol/L 102 101 92*   CO2 mmol/L 27.9 20.4* 25.3   BUN mg/dL 22* 25* 14   CREATININE mg/dL 0.59* 0.72* 0.88   EGFR IF NONAFRICN AM mL/min/1.73 143 113 90   CALCIUM mg/dL 9.0 9.1 9.3   PHOSPHORUS mg/dL  --   --  4.8*   GLUCOSE mg/dL 123* 148* 103*   ALBUMIN g/dL  --   --  4.88   BILIRUBIN mg/dL  --   --  0.3   ALK PHOS U/L  --   --  73   AST (SGOT) U/L  --   --  35   ALT (SGPT) U/L  --   --  21   Estimated Creatinine Clearance: 162.3 mL/min (A) (by C-G formula based on SCr of 0.59 mg/dL  (L)).  No results found for: AMMONIA  Results from last 7 days   Lab Units 11/30/21  0551 11/30/21  0349   TROPONIN T ng/mL <0.010 <0.010     Results from last 7 days   Lab Units 11/30/21  0349   PROBNP pg/mL 144.6         No results found for: HGBA1C, POCGLU  Lab Results   Component Value Date    TSH 0.855 11/30/2021     No results found for: PREGTESTUR, PREGSERUM, HCG, HCGQUANT  Pain Management Panel     Pain Management Panel Latest Ref Rng & Units 12/30/2017    AMPHETAMINES SCREEN, URINE Negative Negative    BARBITURATES SCREEN Negative Negative    BENZODIAZEPINE SCREEN, URINE Negative Negative    BUPRENORPHINEUR Negative Negative    COCAINE SCREEN, URINE Negative Negative    METHADONE SCREEN, URINE Negative Negative        Brief Urine Lab Results     None        Blood Culture   Date Value Ref Range Status   11/30/2021 No growth at 24 hours  Preliminary   11/30/2021 No growth at 24 hours  Preliminary     No results found for: URINECX  No results found for: WOUNDCX  No results found for: STOOLCX  No results found for: RESPCX  No results found for: AFBCX  Results from last 7 days   Lab Units 11/30/21  0349   PROCALCITONIN ng/mL 0.17   LACTATE mmol/L 1.1   CRP mg/dL 9.40*       I have personally looked at the labs and they are summarized above.  ----------------------------------------------------------------------------------------------------------------------  Detailed radiology reports for the last 24 hours:  Imaging Results (Last 24 Hours)     ** No results found for the last 24 hours. **        Assessment & Plan      Patient is a 56-year-old male with history significant for chronic respiratory failure on 3 L nasal cannula at home due to COPD who presented with worsening shortness of breath and diagnosed with an acute exacerbation of COPD.    #Acute on chronic hypoxemic and hypercarbic respiratory failure  #Acute exacerbation of COPD  #RSV positive  --Patient wears 3 L nasal cannula at home, presenting with  worsening shortness of breath and has significant wheezing and poor aeration on exam.  COVID-19 negative.  Acute exacerbation complicated by positive RSV on respiratory panel  --Blood cultures NGTD, unable to produce sputum, procalcitonin negative  --CT angio negative for PE, no infiltrates noted  --Echocardiogram noted preserved systolic function, mild diastolic dysfunction  --Continue bronchodilators, Symbicort  --still has significant wheezing and bronchospasticity, has been bale to tolerate being off bipap for a longer time than yesterday, on 6L NC when off bipap, will increase Solu-Medrol to 40 mg every 8h, IV magnesium continue doxycycline  --Continue to monitor in PCU, titrate FiO2 as tolerable, plan to wear BiPAP at bedtime and as needed, titrate SPO2 to greater than 88%  --Patient requesting home BiPAP, will ask pulmonology for evaluation/recommendations    #Heart failure with preserved ejection fraction, not in acute exacerbation  --Echo per above.  Does not appear overtly overloaded on exam, hold on diuresis    #Leukocytosis, multifactorial secondary to reactive versus steroid-induced    #Chronic alcohol use  #Macrocytosis  --Drinks 3-4 beers per day.  No history of withdrawal.  Continue CIWA, thiamine, multivitamin    #Ongoing tobacco abuse, cessation recommended    CHECKLIST:  Abx: Doxycycline  Steroids: Solu-Medrol 40 mg q8h  VTE: Lovenox  GI ppx: PPI  Diet: Regular  Code: CPR, full  Dispo: Patient remains high risk due to acute on chronic hypoxic hypercarbic respiratory failure due to COPD and RSV positive.  Anticipate greater than 2 midnight stay.  Disposition expected Home when medically stable    Thomas Darden DO  [facility]  12/02/21  12:46 EST

## 2021-12-02 NOTE — PLAN OF CARE
Goal Outcome Evaluation:  Plan of Care Reviewed With: patient        Progress: no change  Outcome Summary: Pt has been on BiPap for the majority of the shift, pt has been on nasal cannula for meals and medications. Pt continues to sound wheezy and tight in all lobes. Pt denies any pain but continues to have shortness of breath. Pt denies any other issues at this time, pulmonology was consulted today. Pt is currently sitting up in bed, alarms are active and audible, and call light is within reach.

## 2021-12-03 LAB
ANION GAP SERPL CALCULATED.3IONS-SCNC: 6.7 MMOL/L (ref 5–15)
BUN SERPL-MCNC: 22 MG/DL (ref 6–20)
BUN/CREAT SERPL: 28.2 (ref 7–25)
CALCIUM SPEC-SCNC: 9.2 MG/DL (ref 8.6–10.5)
CHLORIDE SERPL-SCNC: 103 MMOL/L (ref 98–107)
CO2 SERPL-SCNC: 32.3 MMOL/L (ref 22–29)
CREAT SERPL-MCNC: 0.78 MG/DL (ref 0.76–1.27)
GFR SERPL CREATININE-BSD FRML MDRD: 103 ML/MIN/1.73
GLUCOSE SERPL-MCNC: 118 MG/DL (ref 65–99)
POTASSIUM SERPL-SCNC: 4.5 MMOL/L (ref 3.5–5.2)
SODIUM SERPL-SCNC: 142 MMOL/L (ref 136–145)

## 2021-12-03 PROCEDURE — 94799 UNLISTED PULMONARY SVC/PX: CPT

## 2021-12-03 PROCEDURE — 25010000002 ENOXAPARIN PER 10 MG: Performed by: STUDENT IN AN ORGANIZED HEALTH CARE EDUCATION/TRAINING PROGRAM

## 2021-12-03 PROCEDURE — 25010000002 METHYLPREDNISOLONE PER 40 MG: Performed by: INTERNAL MEDICINE

## 2021-12-03 PROCEDURE — 80048 BASIC METABOLIC PNL TOTAL CA: CPT | Performed by: STUDENT IN AN ORGANIZED HEALTH CARE EDUCATION/TRAINING PROGRAM

## 2021-12-03 PROCEDURE — 99233 SBSQ HOSP IP/OBS HIGH 50: CPT | Performed by: STUDENT IN AN ORGANIZED HEALTH CARE EDUCATION/TRAINING PROGRAM

## 2021-12-03 PROCEDURE — 63710000001 PREDNISONE PER 1 MG: Performed by: INTERNAL MEDICINE

## 2021-12-03 RX ORDER — PREDNISONE 20 MG/1
20 TABLET ORAL 2 TIMES DAILY WITH MEALS
Status: DISCONTINUED | OUTPATIENT
Start: 2021-12-03 | End: 2021-12-05 | Stop reason: HOSPADM

## 2021-12-03 RX ADMIN — PREDNISONE 20 MG: 20 TABLET ORAL at 18:16

## 2021-12-03 RX ADMIN — ATORVASTATIN CALCIUM 40 MG: 40 TABLET, FILM COATED ORAL at 20:17

## 2021-12-03 RX ADMIN — SODIUM CHLORIDE, PRESERVATIVE FREE 10 ML: 5 INJECTION INTRAVENOUS at 20:17

## 2021-12-03 RX ADMIN — BUDESONIDE 0.5 MG: 0.5 INHALANT RESPIRATORY (INHALATION) at 10:14

## 2021-12-03 RX ADMIN — ARFORMOTEROL TARTRATE 15 MCG: 15 SOLUTION RESPIRATORY (INHALATION) at 22:26

## 2021-12-03 RX ADMIN — PANTOPRAZOLE SODIUM 40 MG: 40 TABLET, DELAYED RELEASE ORAL at 09:04

## 2021-12-03 RX ADMIN — ASPIRIN 81 MG: 81 TABLET, COATED ORAL at 09:04

## 2021-12-03 RX ADMIN — METHYLPREDNISOLONE SODIUM SUCCINATE 40 MG: 40 INJECTION, POWDER, FOR SOLUTION INTRAMUSCULAR; INTRAVENOUS at 12:01

## 2021-12-03 RX ADMIN — SODIUM CHLORIDE, PRESERVATIVE FREE 10 ML: 5 INJECTION INTRAVENOUS at 09:04

## 2021-12-03 RX ADMIN — IPRATROPIUM BROMIDE AND ALBUTEROL SULFATE 3 ML: .5; 3 SOLUTION RESPIRATORY (INHALATION) at 15:06

## 2021-12-03 RX ADMIN — IPRATROPIUM BROMIDE AND ALBUTEROL SULFATE 3 ML: .5; 3 SOLUTION RESPIRATORY (INHALATION) at 02:23

## 2021-12-03 RX ADMIN — BUDESONIDE 0.5 MG: 0.5 INHALANT RESPIRATORY (INHALATION) at 22:26

## 2021-12-03 RX ADMIN — ENOXAPARIN SODIUM 40 MG: 40 INJECTION SUBCUTANEOUS at 12:01

## 2021-12-03 RX ADMIN — METOPROLOL TARTRATE 25 MG: 25 TABLET, FILM COATED ORAL at 20:17

## 2021-12-03 RX ADMIN — IPRATROPIUM BROMIDE AND ALBUTEROL SULFATE 3 ML: .5; 3 SOLUTION RESPIRATORY (INHALATION) at 06:48

## 2021-12-03 RX ADMIN — AZITHROMYCIN 250 MG: 250 TABLET, FILM COATED ORAL at 09:04

## 2021-12-03 RX ADMIN — FLUTICASONE PROPIONATE 1 SPRAY: 50 SPRAY, METERED NASAL at 09:04

## 2021-12-03 RX ADMIN — METOPROLOL TARTRATE 25 MG: 25 TABLET, FILM COATED ORAL at 09:04

## 2021-12-03 RX ADMIN — IPRATROPIUM BROMIDE AND ALBUTEROL SULFATE 3 ML: .5; 3 SOLUTION RESPIRATORY (INHALATION) at 18:42

## 2021-12-03 RX ADMIN — ARFORMOTEROL TARTRATE 15 MCG: 15 SOLUTION RESPIRATORY (INHALATION) at 10:13

## 2021-12-03 NOTE — PLAN OF CARE
Goal Outcome Evaluation:              Outcome Summary: patient has sat up in the chair today for majority of my shift and has been on 6L.

## 2021-12-03 NOTE — PROGRESS NOTES
PROGRESS NOTE  Patient Name: Emilio Guy  Age/Sex: 56 y.o. male  : 1965  MRN: [id number]    Date of Admission: 2021  Date of Encounter Visit: 21   LOS: 3 days   Patient Care Team:  Valarie Garcia APRN as PCP - General (Family Medicine)    Chief Complaint: COPD exacerbation with recurrent bronchitis    Hospital course: Patient is doing better, she is on the steroid wean, he is on the nebulizer medication, was started on Zithromax as a transition from IV doxycycline with the plan to continue with the Zithromax on the long-term for its immunomodulatory effect.  The patient is feeling better still wheezing but this is baseline for him, does have dyspnea on activity but able to handle minor activity with no significant distress        REVIEW OF SYSTEMS:   CONSTITUTIONAL: no fever or chills  CARDIOVASCULAR: No chest pain, chest pressure or chest discomfort. No palpitations or edema.   RESPIRATORY: Positive wheezing and shortness of breath but improving  GASTROINTESTINAL: No anorexia, nausea, vomiting or diarrhea. No abdominal pain or blood.   HEMATOLOGIC: No bleeding or bruising.     Ventilator/Non-Invasive Ventilation Settings (From admission, onward)             Start     Ordered    21 0428  NIPPV (CPAP or BIPAP)  Until Discontinued        Question Answer Comment   Indication: Acute Respiratory Failure    Type: BIPAP    IPAP 24    EPAP 8    Breath Rate 24    Titrate Oxygen for SpO2 90% - 95%        21 0427                  Vital Signs  Temp:  [97.3 °F (36.3 °C)-97.7 °F (36.5 °C)] 97.7 °F (36.5 °C)  Heart Rate:  [52-85] 75  Resp:  [12-29] 18  BP: (106-155)/(70-99) 134/75  SpO2:  [90 %-99 %] 94 %  on    Device (Oxygen Therapy): humidified; nasal cannula    Intake/Output Summary (Last 24 hours) at 12/3/2021 0926  Last data filed at 2021 2211  Gross per 24 hour   Intake 480 ml   Output 1975 ml   Net -1495 ml     Flowsheet Rows      First Filed Value   Admission Height 172.7  "cm (68\") Documented at 11/30/2021 0310   Admission Weight 90.7 kg (200 lb) Documented at 11/30/2021 0310        Body mass index is 26.94 kg/m².      12/01/21  0503 12/02/21  0500 12/03/21  0500   Weight: 80.9 kg (178 lb 4.8 oz) 81.1 kg (178 lb 12.8 oz) 80.4 kg (177 lb 3.2 oz)       Physical Exam:  GEN:  No acute distress, alert, cooperative, well developed patient is still talking in short sentences   EYES:   Sclerae clear. No icterus. PERRL. Normal EOM  ENT:   External ears/nose normal, no oral lesions, no thrush, mucous membranes moist  NECK:  Supple, midline trachea, no JVD  LUNGS: Normal chest on inspection, positive expiratory wheezes and inspiratory wheezes with minimal prolongation of the expiratory phase  CV:  Regular rhythm and rate. Normal S1/S2. No murmurs, gallops, or rubs noted.  ABD:  Soft, nontender and nondistended. Normal bowel sounds. No guarding  EXT:  Moves all extremities well. No cyanosis. No redness. No edema.   Skin: Dry, intact, no bleeding    Results Review:    Results From Last 14 Days   Lab Units 11/30/21  0349   CRP mg/dL 9.40*   LACTATE mmol/L 1.1     Results from last 7 days   Lab Units 12/03/21  0118 12/02/21  1027 12/01/21  0913 11/30/21  0349   SODIUM mmol/L 142 138 133* 131*   POTASSIUM mmol/L 4.5 5.4* 5.0 5.2   CHLORIDE mmol/L 103 102 101 92*   CO2 mmol/L 32.3* 27.9 20.4* 25.3   BUN mg/dL 22* 22* 25* 14   CREATININE mg/dL 0.78 0.59* 0.72* 0.88   CALCIUM mg/dL 9.2 9.0 9.1 9.3   AST (SGOT) U/L  --   --   --  35   ALT (SGPT) U/L  --   --   --  21   ANION GAP mmol/L 6.7 8.1 11.6 13.7   ALBUMIN g/dL  --   --   --  4.88     Results from last 7 days   Lab Units 11/30/21  0551 11/30/21  0349   TROPONIN T ng/mL <0.010 <0.010     Results from last 7 days   Lab Units 11/30/21  0551   TSH uIU/mL 0.855     Results from last 7 days   Lab Units 11/30/21  0349   PROBNP pg/mL 144.6     Results from last 7 days   Lab Units 12/02/21  0802 12/01/21  0913 11/30/21  0349   WBC 10*3/mm3 12.97* 17.18* " 11.80*   HEMOGLOBIN g/dL 13.6 14.3 15.5   HEMATOCRIT % 42.5 46.2 48.7   PLATELETS 10*3/mm3 286 266 285   MCV fL 98.8* 102.2* 97.4*   NEUTROPHIL % % 81.9* 80.5* 77.8*   LYMPHOCYTE % % 11.0* 12.0* 14.9*   MONOCYTES % % 5.3 6.5 6.3   EOSINOPHIL % % 0.8 0.1* 0.0*   BASOPHIL % % 0.2 0.1 0.5   IMM GRAN % % 0.8* 0.8* 0.5     Results from last 7 days   Lab Units 11/30/21  0349   INR  0.92   APTT seconds 32.1     Results from last 7 days   Lab Units 11/30/21  0349   MAGNESIUM mg/dL 2.4           Invalid input(s): LDLCALC  Results from last 7 days   Lab Units 11/30/21  0421 11/30/21  0317   PH, ARTERIAL pH units 7.308* 7.280*   PCO2, ARTERIAL mm Hg 53.0* 63.6*   PO2 ART mm Hg 92.9 51.1*   HCO3 ART mmol/L 26.5* 29.9*         No results found for: POCGLU  Results from last 7 days   Lab Units 11/30/21  0349   PROCALCITONIN ng/mL 0.17   LACTATE mmol/L 1.1     Results from last 7 days   Lab Units 11/30/21  0349   BLOODCX  No growth at 3 days  No growth at 3 days         Results from last 7 days   Lab Units 12/01/21  0151 11/30/21  0351 11/30/21  0351   COVID19   --   --  Not Detected   ADENOVIRUS DETECTION BY PCR  Not Detected  --   --    CORONAVIRUS 229E  Not Detected  --   --    CORONAVIRUS HKU1  Not Detected  --   --    CORONAVIRUS NL63  Not Detected  --   --    CORONAVIRUS OC43  Not Detected  --   --    HUMAN METAPNEUMOVIRUS  Not Detected  --   --    HUMAN RHINOVIRUS/ENTEROVIRUS  Not Detected  --   --    INFLUENZA B PCR  Not Detected   < > Not Detected   PARAINFLUENZA 1  Not Detected  --   --    PARAINFLUENZA VIRUS 2  Not Detected  --   --    PARAINFLUENZA VIRUS 3  Not Detected  --   --    PARAINFLUENZA VIRUS 4  Not Detected  --   --    BORDETELLA PERTUSSIS PCR  Not Detected  --   --    BORDETELLA PARAPERTUSSIS PCR  Not Detected  --   --    CHLAMYDOPHILA PNEUMONIAE PCR  Not Detected  --   --    MYCOPLAMA PNEUMO PCR  Not Detected  --   --    RSV, PCR  Detected*  --   --     < > = values in this interval not displayed.                Imaging:   Imaging Results (All)     Procedure Component Value Units Date/Time    CT Chest Pulmonary Embolism [[id number]] Collected: 11/30/21 0705     Updated: 11/30/21 0708    Narrative:      CT CHEST PULMONARY EMBOLISM W CONTRAST    INDICATION:   Shortness of air, evaluate for pulmonary embolus    TECHNIQUE:   CT angiogram of the chest with IV contrast. 3-D reconstructions were obtained and reviewed.   Radiation dose reduction techniques included automated exposure control or exposure modulation based on body size. Count of known CT and cardiac nuc med studies  performed in previous 12 months: 0.     COMPARISON:   None available.    FINDINGS:   There is optimal opacification of the pulmonary arteries. There is no CT evidence of pulmonary embolus. Aorta is normal in appearance. There is no adenopathy. Thyroid gland is normal. Upper abdominal images are unremarkable. The lungs are clear. Bones  are unremarkable.          Impression:      1. Negative for pulmonary embolus.    2. No acute findings in the chest.    Signer Name: Edison Guerrero MD   Signed: 11/30/2021 7:05 AM   Workstation Name: [facility]    [facility]    XR Chest 1 View [[id number]] Collected: 11/30/21 0333     Updated: 11/30/21 0335    Narrative:      CR Chest 1 Vw    INDICATION:   Sepsis.     COMPARISON:    12/30/2017    FINDINGS:  Portable AP view(s) of the chest.  Cardiac and mediastinal contours are normal. Chronic changes in the lungs may be the result of COPD. Correlate with history. There is infiltrate or atelectasis in the lower lungs today. No pneumothorax is identified.      Impression:      Possible COPD. Infiltrate or atelectasis noted in both lung bases on the current study.    Signer Name: Samy Samaniego MD   Signed: 11/30/2021 3:33 AM   Workstation Name: [facility]    [facility]          I reviewed the patient's new clinical results.  I personally viewed and interpreted the  patient's imaging results:        Medication Review:   arformoterol, 15 mcg, Nebulization, BID - RT  aspirin, 81 mg, Oral, Daily  atorvastatin, 40 mg, Oral, Nightly  azithromycin, 250 mg, Oral, Daily  budesonide, 0.5 mg, Nebulization, BID - RT  enoxaparin, 40 mg, Subcutaneous, Q24H  fluticasone, 1 spray, Nasal, Daily  ipratropium-albuterol, 3 mL, Nebulization, Q4H - RT  methylPREDNISolone sodium succinate, 40 mg, Intravenous, Q12H  metoprolol tartrate, 25 mg, Oral, BID  pantoprazole, 40 mg, Oral, Daily  sodium chloride, 10 mL, Intravenous, Q12H             ASSESSMENT:   1. Acute exacerbation of COPD  2. Acute on chronic hypoxemic and hypercapnic respiratory failure  3. Diastolic congestive heart failure  4. Chronic alcohol use  5. RSV bronchitis  6. Mild hyperkalemia  7. Hyponatremia, improved      PLAN:  [facility] consulted to assess for coverage for long-acting bronchodilator after discharge on his insurance plan  Recommend to continue with daily Zithromax dose indefinitely, will need to have an EKG to check for any QT prolongation before discharge on that dose  As discussed earlier based on the ABG patient is less likely to be a candidate for noninvasive positive pressure ventilation at home but this can be further confirmed by repeating his ABG once he is in a compensated state  Patient can be transitioned to p.o. prednisone, he should be cleared for discharge home from my standpoint if he has proper support and may slowly taper the prednisone as an outpatient over the next 10 to 14 days    Discussed with patient    Disposition: Per primary team    Tracey Leggett MD  12/03/21  09:26 EST          Dictated utilizing Dragon dictation

## 2021-12-03 NOTE — PROGRESS NOTES
[facility] HOSPITALIST PROGRESS NOTE     Patient Identification:  Name:  Emilio Guy  Age:  56 y.o.  Sex:  male  :  1965  MRN:  [id number]  Visit Number:  [id number]  ROOM: [place] [address]     Primary Care Provider:  Valarie Garcia APRN    Length of stay in inpatient status:  3    Subjective     Chief Compliant:    Chief Complaint   Patient presents with   • Shortness of Breath       History of Presenting Illness:    Patient seen in follow-up for acute exacerbation of COPD.  He wore the BiPAP overnight and states he feels better today.  Still has significant wheezing but has used the BiPAP throughout the day last than previous days. Denies any significant sputum production, chest pain or other symptoms.  Has been afebrile.  No adverse events noted by nursing.    Objective     Current Hospital Meds:arformoterol, 15 mcg, Nebulization, BID - RT  aspirin, 81 mg, Oral, Daily  atorvastatin, 40 mg, Oral, Nightly  azithromycin, 250 mg, Oral, Daily  budesonide, 0.5 mg, Nebulization, BID - RT  enoxaparin, 40 mg, Subcutaneous, Q24H  fluticasone, 1 spray, Nasal, Daily  ipratropium-albuterol, 3 mL, Nebulization, Q4H - RT  methylPREDNISolone sodium succinate, 40 mg, Intravenous, Q12H  metoprolol tartrate, 25 mg, Oral, BID  pantoprazole, 40 mg, Oral, Daily  sodium chloride, 10 mL, Intravenous, Q12H         Current Antimicrobial Therapy:  Anti-Infectives (From admission, onward)    Ordered     Dose/Rate Route Frequency Start Stop    21 1738  azithromycin (ZITHROMAX) tablet 250 mg        Ordering Provider: Tracey Leggett MD    250 mg Oral Daily 21 1900 22 0859    21 0317  cefTRIAXone (ROCEPHIN) 1 g in sodium chloride 0.9 % 100 mL IVPB-VTB        Ordering Provider: Mark Steen MD    1 g  200 mL/hr over 30 Minutes Intravenous Once 21 0319 21 0413    21 0317  doxycycline (VIBRAMYCIN) 100 mg in sodium chloride 0.9 % 100 mL IVPB-VTB        Ordering  Provider: Mark Steen MD    100 mg  over 60 Minutes Intravenous Once 11/30/21 0319 11/30/21 0647        Current Diuretic Therapy:  Diuretics (From admission, onward)    Ordered     Dose/Rate Route Frequency Start Stop    12/01/21 0745  furosemide (LASIX) injection 40 mg        Ordering Provider: Thomas Darden, DO    40 mg Intravenous Once 12/01/21 0845 12/01/21 0812        ----------------------------------------------------------------------------------------------------------------------  Vital Signs:  Temp:  [97.3 °F (36.3 °C)-97.7 °F (36.5 °C)] 97.7 °F (36.5 °C)  Heart Rate:  [52-85] 79  Resp:  [12-29] 24  BP: (106-155)/(69-99) 120/69  SpO2:  [90 %-99 %] 94 %  on  Flow (L/min):  [6] 6;   Device (Oxygen Therapy): humidified; nasal cannula  Body mass index is 26.94 kg/m².    Wt Readings from Last 3 Encounters:   12/03/21 80.4 kg (177 lb 3.2 oz)   12/30/17 68 kg (150 lb)     Intake & Output (last 3 days)       11/30 0701  12/01 0700 12/01 0701 12/02 0700 12/02 0701  12/03 0700 12/03 0701  12/04 0700    P.O.  720 840     I.V. (mL/kg) 555 (6.9) 959 (11.8)      IV Piggyback 1000 100      Total Intake(mL/kg) 1555 (19.2) 1779 (21.9) 840 (10.4)     Urine (mL/kg/hr) 1750 (0.9) 2075 (1.1) 2775 (1.4)     Total Output 1750 2075 2775     Net -195 296 -1930                 Diet Regular; Cardiac  ----------------------------------------------------------------------------------------------------------------------  Physical exam:  Constitutional: Older male, nontoxic appearing, sitting on side of the bed on NC, NAD  HENT:  Head:  Normocephalic and atraumatic.  Mouth:  Moist mucous membranes.    Eyes:  Conjunctivae and EOM are normal. No scleral icterus.   Cardiovascular:  Normal rate, regular rhythm and normal heart sounds with no murmur. No JVD.   Pulmonary/Chest: better than admission but still has significant wheezing noted and bronchospastic  Abdominal:  Soft. No distension and no tenderness.   Bowel sounds present. No rebound or guarding.   Musculoskeletal:  No tenderness, and no deformity.  No red or swollen joints anywhere.    Neurological:  Alert and oriented to person, place, and time.  No cranial nerve deficit.   Nonfocal.   Skin:  Skin is warm and dry. No rash noted. No pallor.   Peripheral vascular:  No clubbing, no cyanosis, no edema. Pedal and tibial pulses 2/4 bilaterally.   ----------------------------------------------------------------------------------------------------------------------  Results from last 7 days   Lab Units 12/02/21  0802 12/01/21  0913 11/30/21  0349   CRP mg/dL  --   --  9.40*   LACTATE mmol/L  --   --  1.1   WBC 10*3/mm3 12.97* 17.18* 11.80*   HEMOGLOBIN g/dL 13.6 14.3 15.5   HEMATOCRIT % 42.5 46.2 48.7   MCV fL 98.8* 102.2* 97.4*   MCHC g/dL 32.0 31.0* 31.8   PLATELETS 10*3/mm3 286 266 285   INR   --   --  0.92     Results from last 7 days   Lab Units 11/30/21  0421   PH, ARTERIAL pH units 7.308*   PO2 ART mm Hg 92.9   PCO2, ARTERIAL mm Hg 53.0*   HCO3 ART mmol/L 26.5*     Results from last 7 days   Lab Units 12/03/21  0118 12/02/21  1027 12/01/21  0913 11/30/21  0349 11/30/21  0349   SODIUM mmol/L 142 138 133*   < > 131*   POTASSIUM mmol/L 4.5 5.4* 5.0   < > 5.2   MAGNESIUM mg/dL  --   --   --   --  2.4   CHLORIDE mmol/L 103 102 101   < > 92*   CO2 mmol/L 32.3* 27.9 20.4*   < > 25.3   BUN mg/dL 22* 22* 25*   < > 14   CREATININE mg/dL 0.78 0.59* 0.72*   < > 0.88   EGFR IF NONAFRICN AM mL/min/1.73 103 143 113   < > 90   CALCIUM mg/dL 9.2 9.0 9.1   < > 9.3   PHOSPHORUS mg/dL  --   --   --   --  4.8*   GLUCOSE mg/dL 118* 123* 148*   < > 103*   ALBUMIN g/dL  --   --   --   --  4.88   BILIRUBIN mg/dL  --   --   --   --  0.3   ALK PHOS U/L  --   --   --   --  73   AST (SGOT) U/L  --   --   --   --  35   ALT (SGPT) U/L  --   --   --   --  21    < > = values in this interval not displayed.   Estimated Creatinine Clearance: 121.7 mL/min (by C-G formula based on SCr of 0.78  mg/dL).  No results found for: AMMONIA  Results from last 7 days   Lab Units 11/30/21  0551 11/30/21  0349   TROPONIN T ng/mL <0.010 <0.010     Results from last 7 days   Lab Units 11/30/21  0349   PROBNP pg/mL 144.6         No results found for: HGBA1C, POCGLU  Lab Results   Component Value Date    TSH 0.855 11/30/2021     No results found for: PREGTESTUR, PREGSERUM, HCG, HCGQUANT  Pain Management Panel     Pain Management Panel Latest Ref Rng & Units 12/30/2017    AMPHETAMINES SCREEN, URINE Negative Negative    BARBITURATES SCREEN Negative Negative    BENZODIAZEPINE SCREEN, URINE Negative Negative    BUPRENORPHINEUR Negative Negative    COCAINE SCREEN, URINE Negative Negative    METHADONE SCREEN, URINE Negative Negative        Brief Urine Lab Results     None        Blood Culture   Date Value Ref Range Status   11/30/2021 No growth at 24 hours  Preliminary   11/30/2021 No growth at 24 hours  Preliminary     No results found for: URINECX  No results found for: WOUNDCX  No results found for: STOOLCX  No results found for: RESPCX  No results found for: AFBCX  Results from last 7 days   Lab Units 11/30/21  0349   PROCALCITONIN ng/mL 0.17   LACTATE mmol/L 1.1   CRP mg/dL 9.40*       I have personally looked at the labs and they are summarized above.  ----------------------------------------------------------------------------------------------------------------------  Detailed radiology reports for the last 24 hours:  Imaging Results (Last 24 Hours)     ** No results found for the last 24 hours. **        Assessment & Plan      Patient is a 56-year-old male with history significant for chronic respiratory failure on 3 L nasal cannula at home due to COPD who presented with worsening shortness of breath and diagnosed with an acute exacerbation of COPD.    #Acute on chronic hypoxemic and hypercarbic respiratory failure  #Acute exacerbation of COPD  #RSV positive  --Patient wears 3 L nasal cannula at home, presenting with  worsening shortness of breath and has significant wheezing and poor aeration on exam.  COVID-19 negative.  Acute exacerbation possibly triggered by positive RSV on respiratory panel  --Blood cultures NGTD, unable to produce sputum, procalcitonin negative  --CT angio negative for PE, no infiltrates noted  --Echocardiogram noted preserved systolic function, mild diastolic dysfunction  --still has significant wheezing and bronchospasticity, has been able to tolerate being off bipap for a longer time than yesterday, on 6L NC when off bipap.   --will plan an ABG when patient is more stable and compensated to evaluate for potential need for home NIPPV  --continue Brovana, Pulmicort, duo nebs  --continue Solu-Medrol 40 mg every 12 hours, p.o. azithromycin  --Continue to monitor in PCU, titrate FiO2 as tolerable, plan to wear BiPAP at bedtime and as needed, titrate SPO2 to greater than 88%  --Pulmonology following, appreciate recommendations    #Heart failure with preserved ejection fraction, not in acute exacerbation  --Echo per above.  Does not appear overtly overloaded on exam, hold on diuresis    #Leukocytosis, multifactorial secondary to reactive versus steroid-induced    #Chronic alcohol use  #Macrocytosis  --Drinks 3-4 beers per day.  No history of withdrawal.  Continue CIWA, thiamine, multivitamin    #Ongoing tobacco abuse, cessation recommended    CHECKLIST:  Abx: azithromycin  Steroids: Solu-Medrol 40 mg q12h  VTE: Lovenox  GI ppx: PPI  Diet: Regular  Code: CPR, full  Dispo: Patient has shown slow improvement but still remains significantly bronchospastic requiring IV corticosteroids and intermittent NIPPV.  Anticipate greater than 2 midnight stay. will continue to monitor him on PCU today. Disposition expected Home when medically stable    Thomas Darden DO  [facility] Hospitalist  12/03/21  10:15 EST

## 2021-12-03 NOTE — PLAN OF CARE
Goal Outcome Evaluation:  Plan of Care Reviewed With: patient           Outcome Summary: Pt a&o x4. Bipap 32%. Reg cardiac diet. Pt has wheezes on inspiratory and expiratory. Call light in reach. Bed low, locked, and alarmed. Will continue to monitor 7p-7a.

## 2021-12-04 LAB
ANION GAP SERPL CALCULATED.3IONS-SCNC: 8.2 MMOL/L (ref 5–15)
BUN SERPL-MCNC: 24 MG/DL (ref 6–20)
BUN/CREAT SERPL: 32.9 (ref 7–25)
CALCIUM SPEC-SCNC: 9.8 MG/DL (ref 8.6–10.5)
CHLORIDE SERPL-SCNC: 99 MMOL/L (ref 98–107)
CO2 SERPL-SCNC: 30.8 MMOL/L (ref 22–29)
CREAT SERPL-MCNC: 0.73 MG/DL (ref 0.76–1.27)
GFR SERPL CREATININE-BSD FRML MDRD: 112 ML/MIN/1.73
GLUCOSE SERPL-MCNC: 117 MG/DL (ref 65–99)
POTASSIUM SERPL-SCNC: 4.8 MMOL/L (ref 3.5–5.2)
SODIUM SERPL-SCNC: 138 MMOL/L (ref 136–145)

## 2021-12-04 PROCEDURE — 25010000002 ENOXAPARIN PER 10 MG: Performed by: STUDENT IN AN ORGANIZED HEALTH CARE EDUCATION/TRAINING PROGRAM

## 2021-12-04 PROCEDURE — 80048 BASIC METABOLIC PNL TOTAL CA: CPT | Performed by: STUDENT IN AN ORGANIZED HEALTH CARE EDUCATION/TRAINING PROGRAM

## 2021-12-04 PROCEDURE — 99232 SBSQ HOSP IP/OBS MODERATE 35: CPT | Performed by: STUDENT IN AN ORGANIZED HEALTH CARE EDUCATION/TRAINING PROGRAM

## 2021-12-04 PROCEDURE — 63710000001 PREDNISONE PER 1 MG: Performed by: INTERNAL MEDICINE

## 2021-12-04 PROCEDURE — 94799 UNLISTED PULMONARY SVC/PX: CPT

## 2021-12-04 PROCEDURE — 94760 N-INVAS EAR/PLS OXIMETRY 1: CPT

## 2021-12-04 PROCEDURE — 94660 CPAP INITIATION&MGMT: CPT

## 2021-12-04 RX ORDER — SODIUM CHLORIDE 0.9 % (FLUSH) 0.9 %
10 SYRINGE (ML) INJECTION EVERY 12 HOURS SCHEDULED
Status: DISCONTINUED | OUTPATIENT
Start: 2021-12-04 | End: 2021-12-05 | Stop reason: HOSPADM

## 2021-12-04 RX ORDER — SODIUM CHLORIDE 0.9 % (FLUSH) 0.9 %
10 SYRINGE (ML) INJECTION AS NEEDED
Status: DISCONTINUED | OUTPATIENT
Start: 2021-12-04 | End: 2021-12-05 | Stop reason: HOSPADM

## 2021-12-04 RX ADMIN — AZITHROMYCIN 250 MG: 250 TABLET, FILM COATED ORAL at 09:07

## 2021-12-04 RX ADMIN — BUDESONIDE 0.5 MG: 0.5 INHALANT RESPIRATORY (INHALATION) at 22:15

## 2021-12-04 RX ADMIN — ARFORMOTEROL TARTRATE 15 MCG: 15 SOLUTION RESPIRATORY (INHALATION) at 22:15

## 2021-12-04 RX ADMIN — METOPROLOL TARTRATE 25 MG: 25 TABLET, FILM COATED ORAL at 20:12

## 2021-12-04 RX ADMIN — ENOXAPARIN SODIUM 40 MG: 40 INJECTION SUBCUTANEOUS at 10:42

## 2021-12-04 RX ADMIN — IPRATROPIUM BROMIDE AND ALBUTEROL SULFATE 3 ML: .5; 3 SOLUTION RESPIRATORY (INHALATION) at 18:25

## 2021-12-04 RX ADMIN — IPRATROPIUM BROMIDE AND ALBUTEROL SULFATE 3 ML: .5; 3 SOLUTION RESPIRATORY (INHALATION) at 06:35

## 2021-12-04 RX ADMIN — METOPROLOL TARTRATE 25 MG: 25 TABLET, FILM COATED ORAL at 09:07

## 2021-12-04 RX ADMIN — PANTOPRAZOLE SODIUM 40 MG: 40 TABLET, DELAYED RELEASE ORAL at 09:07

## 2021-12-04 RX ADMIN — BUDESONIDE 0.5 MG: 0.5 INHALANT RESPIRATORY (INHALATION) at 10:05

## 2021-12-04 RX ADMIN — SODIUM CHLORIDE, PRESERVATIVE FREE 10 ML: 5 INJECTION INTRAVENOUS at 20:13

## 2021-12-04 RX ADMIN — IPRATROPIUM BROMIDE AND ALBUTEROL SULFATE 3 ML: .5; 3 SOLUTION RESPIRATORY (INHALATION) at 02:10

## 2021-12-04 RX ADMIN — PREDNISONE 20 MG: 20 TABLET ORAL at 18:09

## 2021-12-04 RX ADMIN — PREDNISONE 20 MG: 20 TABLET ORAL at 09:07

## 2021-12-04 RX ADMIN — IPRATROPIUM BROMIDE AND ALBUTEROL SULFATE 3 ML: .5; 3 SOLUTION RESPIRATORY (INHALATION) at 14:53

## 2021-12-04 RX ADMIN — SODIUM CHLORIDE, PRESERVATIVE FREE 10 ML: 5 INJECTION INTRAVENOUS at 09:07

## 2021-12-04 RX ADMIN — ATORVASTATIN CALCIUM 40 MG: 40 TABLET, FILM COATED ORAL at 20:12

## 2021-12-04 RX ADMIN — ARFORMOTEROL TARTRATE 15 MCG: 15 SOLUTION RESPIRATORY (INHALATION) at 10:05

## 2021-12-04 RX ADMIN — SODIUM CHLORIDE, PRESERVATIVE FREE 10 ML: 5 INJECTION INTRAVENOUS at 20:14

## 2021-12-04 RX ADMIN — FLUTICASONE PROPIONATE 1 SPRAY: 50 SPRAY, METERED NASAL at 09:08

## 2021-12-04 RX ADMIN — ASPIRIN 81 MG: 81 TABLET, COATED ORAL at 09:07

## 2021-12-04 NOTE — PROGRESS NOTES
[facility] HOSPITALIST PROGRESS NOTE     Patient Identification:  Name:  Emilio Guy  Age:  56 y.o.  Sex:  male  :  1965  MRN:  [id number]  Visit Number:  [id number]  ROOM: [place] [address]     Primary Care Provider:  Valarie Garcia APRN    Length of stay in inpatient status:  4    Subjective     Chief Compliant:    Chief Complaint   Patient presents with   • Shortness of Breath       History of Presenting Illness:    Patient seen in follow-up for acute exacerbation of COPD.  He was laying flat in the bed on 4 L nasal cannula when I entered the room.  Stated he felt much better today, was breathing easier.  Did not wear the BiPAP overnight.  Still complains of significant congestion with issues coughing sputum up.  Denies any significant sputum production, chest pain or other symptoms.  Has been afebrile.  No adverse events noted by nursing.    Objective     Current Hospital Meds:arformoterol, 15 mcg, Nebulization, BID - RT  aspirin, 81 mg, Oral, Daily  atorvastatin, 40 mg, Oral, Nightly  azithromycin, 250 mg, Oral, Daily  budesonide, 0.5 mg, Nebulization, BID - RT  enoxaparin, 40 mg, Subcutaneous, Q24H  fluticasone, 1 spray, Nasal, Daily  ipratropium-albuterol, 3 mL, Nebulization, Q4H - RT  metoprolol tartrate, 25 mg, Oral, BID  pantoprazole, 40 mg, Oral, Daily  predniSONE, 20 mg, Oral, BID With Meals  sodium chloride, 10 mL, Intravenous, Q12H  sodium chloride, 10 mL, Intravenous, Q12H         Current Antimicrobial Therapy:  Anti-Infectives (From admission, onward)    Ordered     Dose/Rate Route Frequency Start Stop    21 1738  azithromycin (ZITHROMAX) tablet 250 mg        Ordering Provider: Tracey Leggett MD    250 mg Oral Daily 21 1900 22 0859    21  cefTRIAXone (ROCEPHIN) 1 g in sodium chloride 0.9 % 100 mL IVPB-VTB        Ordering Provider: Mark Steen MD    1 g  200 mL/hr over 30 Minutes Intravenous Once 21 03121 0414     11/30/21 0317  doxycycline (VIBRAMYCIN) 100 mg in sodium chloride 0.9 % 100 mL IVPB-VTB        Ordering Provider: Mark Steen MD    100 mg  over 60 Minutes Intravenous Once 11/30/21 0319 11/30/21 0647        Current Diuretic Therapy:  Diuretics (From admission, onward)    Ordered     Dose/Rate Route Frequency Start Stop    12/01/21 0745  furosemide (LASIX) injection 40 mg        Ordering Provider: Thomas Darden DO    40 mg Intravenous Once 12/01/21 0845 12/01/21 0812        ----------------------------------------------------------------------------------------------------------------------  Vital Signs:  Temp:  [97.8 °F (36.6 °C)-98.5 °F (36.9 °C)] 98.5 °F (36.9 °C)  Heart Rate:  [57-92] 62  Resp:  [11-27] 20  BP: (104-165)/() 151/101  SpO2:  [89 %-99 %] 91 %  on  Flow (L/min):  [4-6] 4;   Device (Oxygen Therapy): nasal cannula; humidified  Body mass index is 26.4 kg/m².    Wt Readings from Last 3 Encounters:   12/04/21 78.7 kg (173 lb 9.6 oz)   12/30/17 68 kg (150 lb)     Intake & Output (last 3 days)       12/01 0701 12/02 0700 12/02 0701 12/03 0700 12/03 0701 12/04 0700 12/04 0701 12/05 0700    P.O. 720 840 472 480    I.V. (mL/kg) 959 (11.8)  0 (0)     IV Piggyback 100       Total Intake(mL/kg) 1779 (21.9) 840 (10.4) 472 (6) 480 (6.1)    Urine (mL/kg/hr) 2075 (1.1) 2775 (1.4) 3325 (1.8) 700 (2.7)    Stool   0     Total Output 2075 2775 3325 700    Net -296 -6201 -1858 -220            Stool Unmeasured Occurrence   2 x         Diet Regular; Cardiac  ----------------------------------------------------------------------------------------------------------------------  Physical exam:  Constitutional: Older male, nontoxic appearing, sitting on side of the bed on NC, NAD  HENT:  Head:  Normocephalic and atraumatic.  Mouth:  Moist mucous membranes.    Eyes:  Conjunctivae and EOM are normal. No scleral icterus.   Cardiovascular:  Normal rate, regular rhythm and normal heart sounds  with no murmur. No JVD.   Pulmonary/Chest: better than admission but still has significant wheezing noted and bronchospastic  Abdominal:  Soft. No distension and no tenderness.  Bowel sounds present. No rebound or guarding.   Musculoskeletal:  No tenderness, and no deformity.  No red or swollen joints anywhere.    Neurological:  Alert and oriented to person, place, and time.  No cranial nerve deficit.   Nonfocal.   Skin:  Skin is warm and dry. No rash noted. No pallor.   Peripheral vascular:  No clubbing, no cyanosis, no edema. Pedal and tibial pulses 2/4 bilaterally.   ----------------------------------------------------------------------------------------------------------------------  Results from last 7 days   Lab Units 12/02/21  0802 12/01/21  0913 11/30/21  0349   CRP mg/dL  --   --  9.40*   LACTATE mmol/L  --   --  1.1   WBC 10*3/mm3 12.97* 17.18* 11.80*   HEMOGLOBIN g/dL 13.6 14.3 15.5   HEMATOCRIT % 42.5 46.2 48.7   MCV fL 98.8* 102.2* 97.4*   MCHC g/dL 32.0 31.0* 31.8   PLATELETS 10*3/mm3 286 266 285   INR   --   --  0.92     Results from last 7 days   Lab Units 11/30/21  0421   PH, ARTERIAL pH units 7.308*   PO2 ART mm Hg 92.9   PCO2, ARTERIAL mm Hg 53.0*   HCO3 ART mmol/L 26.5*     Results from last 7 days   Lab Units 12/04/21  0106 12/03/21  0118 12/02/21  1027 12/01/21  0913 11/30/21  0349   SODIUM mmol/L 138 142 138   < > 131*   POTASSIUM mmol/L 4.8 4.5 5.4*   < > 5.2   MAGNESIUM mg/dL  --   --   --   --  2.4   CHLORIDE mmol/L 99 103 102   < > 92*   CO2 mmol/L 30.8* 32.3* 27.9   < > 25.3   BUN mg/dL 24* 22* 22*   < > 14   CREATININE mg/dL 0.73* 0.78 0.59*   < > 0.88   EGFR IF NONAFRICN AM mL/min/1.73 112 103 143   < > 90   CALCIUM mg/dL 9.8 9.2 9.0   < > 9.3   PHOSPHORUS mg/dL  --   --   --   --  4.8*   GLUCOSE mg/dL 117* 118* 123*   < > 103*   ALBUMIN g/dL  --   --   --   --  4.88   BILIRUBIN mg/dL  --   --   --   --  0.3   ALK PHOS U/L  --   --   --   --  73   AST (SGOT) U/L  --   --   --   --  35    ALT (SGPT) U/L  --   --   --   --  21    < > = values in this interval not displayed.   Estimated Creatinine Clearance: 127.3 mL/min (A) (by C-G formula based on SCr of 0.73 mg/dL (L)).  No results found for: AMMONIA  Results from last 7 days   Lab Units 11/30/21  0551 11/30/21  0349   TROPONIN T ng/mL <0.010 <0.010     Results from last 7 days   Lab Units 11/30/21  0349   PROBNP pg/mL 144.6         No results found for: HGBA1C, POCGLU  Lab Results   Component Value Date    TSH 0.855 11/30/2021     No results found for: PREGTESTUR, PREGSERUM, HCG, HCGQUANT  Pain Management Panel     Pain Management Panel Latest Ref Rng & Units 12/30/2017    AMPHETAMINES SCREEN, URINE Negative Negative    BARBITURATES SCREEN Negative Negative    BENZODIAZEPINE SCREEN, URINE Negative Negative    BUPRENORPHINEUR Negative Negative    COCAINE SCREEN, URINE Negative Negative    METHADONE SCREEN, URINE Negative Negative        Brief Urine Lab Results     None        Blood Culture   Date Value Ref Range Status   11/30/2021 No growth at 24 hours  Preliminary   11/30/2021 No growth at 24 hours  Preliminary     No results found for: URINECX  No results found for: WOUNDCX  No results found for: STOOLCX  No results found for: RESPCX  No results found for: AFBCX  Results from last 7 days   Lab Units 11/30/21  0349   PROCALCITONIN ng/mL 0.17   LACTATE mmol/L 1.1   CRP mg/dL 9.40*       I have personally looked at the labs and they are summarized above.  ----------------------------------------------------------------------------------------------------------------------  Detailed radiology reports for the last 24 hours:  Imaging Results (Last 24 Hours)     ** No results found for the last 24 hours. **        Assessment & Plan      Patient is a 56-year-old male with history significant for chronic respiratory failure on 3 L nasal cannula at home due to COPD who presented with worsening shortness of breath and diagnosed with an acute exacerbation  of COPD.    #Acute on chronic hypoxemic and hypercarbic respiratory failure  #Acute exacerbation of COPD  #RSV positive  --Patient wears 3 L nasal cannula at home when he feels he needs it, says he has used up to 5 L at times, presenting with worsening shortness of breath and has significant wheezing and poor aeration on exam.  COVID-19 negative.  Acute exacerbation possibly triggered by positive RSV on respiratory panel  --Blood cultures NGTD, unable to produce sputum, procalcitonin negative  --CT angio negative for PE, no infiltrates noted  --Echocardiogram noted preserved systolic function, mild diastolic dysfunction  --still has significant wheezing and bronchospasticity but has significantly improved over the last 24hrs, now on RA-2L, did not require bipap overnight  --has been more stable, did not wear bipap overnight, will plan an ABG in am to evaluate for potential need for home NIPPV  --continue Brovana, Pulmicort, duo nebs  --continue po prednisone with extended 10-14 day taper, p.o. azithromycin indefinitely per pulmonology  --no bipap overnight for am abg, titrate SPO2 to greater than 88%  --Pulmonology following, appreciate recommendations    #Heart failure with preserved ejection fraction, not in acute exacerbation  --Echo per above.  Does not appear overtly overloaded on exam, hold on diuresis    #Leukocytosis, multifactorial secondary to reactive versus steroid-induced    #Chronic alcohol use  #Macrocytosis  --Drinks 3-4 beers per day.  No history of withdrawal.  Continue CIWA, thiamine, multivitamin    #Ongoing tobacco abuse, cessation recommended    CHECKLIST:  Abx: azithromycin  Steroids: prednisone  VTE: Lovenox  GI ppx: PPI  Diet: Regular  Code: CPR, full  Dispo: Patient has improved and did not require BiPAP overnight.  Has been on 2 L nasal cannula remained stable.  Still significantly wheezing and bronchospastic but better than yesterday.  Keep off BiPAP overnight, repeat ABG in a.m. to see  if he qualifies for NIPPV.  Anticipate discharge home tomorrow.     Thomas Darden,   [facility]  12/04/21  10:21 EST

## 2021-12-04 NOTE — PLAN OF CARE
Goal Outcome Evaluation:  Plan of Care Reviewed With: patient           Outcome Summary: Pt a&o x4.  Pt continue to wear NC 6L humidfied at 94% but pt continues to say hes not getting enough oxygen.  Pt has sat in the chair until about 2am this morning and has done better than last night.  Pt is resting well in bed with no additional complaints.  Call light in reach. Bed low, locked, and alarmed. Will continue to monitor 7p-7a.

## 2021-12-04 NOTE — PLAN OF CARE
Goal Outcome Evaluation:              Outcome Summary: attempted to place patient on room air today but he was unable to tolerate, desat to 86% so placed on 2L NC. Patient has been napping intermittently thoughout the shift. No other issues.

## 2021-12-05 VITALS
HEART RATE: 83 BPM | OXYGEN SATURATION: 97 % | RESPIRATION RATE: 22 BRPM | HEIGHT: 68 IN | BODY MASS INDEX: 26.52 KG/M2 | WEIGHT: 175 LBS | DIASTOLIC BLOOD PRESSURE: 90 MMHG | TEMPERATURE: 98.4 F | SYSTOLIC BLOOD PRESSURE: 144 MMHG

## 2021-12-05 LAB
A-A DO2: 65.4 MMHG (ref 0–300)
ANION GAP SERPL CALCULATED.3IONS-SCNC: 7.6 MMOL/L (ref 5–15)
ARTERIAL PATENCY WRIST A: POSITIVE
ATMOSPHERIC PRESS: 729 MMHG
BACTERIA SPEC AEROBE CULT: NORMAL
BACTERIA SPEC AEROBE CULT: NORMAL
BASE EXCESS BLDA CALC-SCNC: 11.6 MMOL/L (ref 0–2)
BDY SITE: ABNORMAL
BODY TEMPERATURE: 0 C
BUN SERPL-MCNC: 18 MG/DL (ref 6–20)
BUN/CREAT SERPL: 22.5 (ref 7–25)
CALCIUM SPEC-SCNC: 9.4 MG/DL (ref 8.6–10.5)
CHLORIDE SERPL-SCNC: 98 MMOL/L (ref 98–107)
CO2 BLDA-SCNC: 39.9 MMOL/L (ref 22–33)
CO2 SERPL-SCNC: 35.4 MMOL/L (ref 22–29)
COHGB MFR BLD: 0.9 % (ref 0–5)
CREAT SERPL-MCNC: 0.8 MG/DL (ref 0.76–1.27)
GFR SERPL CREATININE-BSD FRML MDRD: 100 ML/MIN/1.73
GLUCOSE SERPL-MCNC: 112 MG/DL (ref 65–99)
HCO3 BLDA-SCNC: 38.2 MMOL/L (ref 20–26)
HCT VFR BLD CALC: 45.2 % (ref 38–51)
HGB BLDA-MCNC: 14.7 G/DL (ref 14–18)
INHALED O2 CONCENTRATION: 28 %
Lab: ABNORMAL
METHGB BLD QL: 0.3 % (ref 0–3)
MODALITY: ABNORMAL
NOTE: ABNORMAL
OXYHGB MFR BLDV: 91.4 % (ref 94–99)
PCO2 BLDA: 56.1 MM HG (ref 35–45)
PCO2 TEMP ADJ BLD: ABNORMAL MM[HG]
PH BLDA: 7.44 PH UNITS (ref 7.35–7.45)
PH, TEMP CORRECTED: ABNORMAL
PO2 BLDA: 62.8 MM HG (ref 83–108)
PO2 TEMP ADJ BLD: ABNORMAL MM[HG]
POTASSIUM SERPL-SCNC: 4.2 MMOL/L (ref 3.5–5.2)
SAO2 % BLDCOA: 92.5 % (ref 94–99)
SODIUM SERPL-SCNC: 141 MMOL/L (ref 136–145)
VENTILATOR MODE: ABNORMAL

## 2021-12-05 PROCEDURE — 94799 UNLISTED PULMONARY SVC/PX: CPT

## 2021-12-05 PROCEDURE — 99239 HOSP IP/OBS DSCHRG MGMT >30: CPT | Performed by: STUDENT IN AN ORGANIZED HEALTH CARE EDUCATION/TRAINING PROGRAM

## 2021-12-05 PROCEDURE — 83050 HGB METHEMOGLOBIN QUAN: CPT

## 2021-12-05 PROCEDURE — 82805 BLOOD GASES W/O2 SATURATION: CPT

## 2021-12-05 PROCEDURE — 25010000002 ENOXAPARIN PER 10 MG: Performed by: STUDENT IN AN ORGANIZED HEALTH CARE EDUCATION/TRAINING PROGRAM

## 2021-12-05 PROCEDURE — 82375 ASSAY CARBOXYHB QUANT: CPT

## 2021-12-05 PROCEDURE — 80048 BASIC METABOLIC PNL TOTAL CA: CPT | Performed by: STUDENT IN AN ORGANIZED HEALTH CARE EDUCATION/TRAINING PROGRAM

## 2021-12-05 PROCEDURE — 36600 WITHDRAWAL OF ARTERIAL BLOOD: CPT

## 2021-12-05 PROCEDURE — 63710000001 PREDNISONE PER 1 MG: Performed by: INTERNAL MEDICINE

## 2021-12-05 RX ORDER — ATORVASTATIN CALCIUM 40 MG/1
40 TABLET, FILM COATED ORAL NIGHTLY
Qty: 30 TABLET | Refills: 0 | Status: SHIPPED | OUTPATIENT
Start: 2021-12-05 | End: 2022-08-11 | Stop reason: HOSPADM

## 2021-12-05 RX ORDER — AZITHROMYCIN 250 MG/1
TABLET, FILM COATED ORAL
Qty: 30 TABLET | Refills: 0 | Status: SHIPPED | OUTPATIENT
Start: 2021-12-05 | End: 2022-07-25

## 2021-12-05 RX ORDER — ARFORMOTEROL TARTRATE 15 UG/2ML
15 SOLUTION RESPIRATORY (INHALATION)
Qty: 120 ML | Refills: 0 | Status: SHIPPED | OUTPATIENT
Start: 2021-12-05 | End: 2021-12-05

## 2021-12-05 RX ORDER — IPRATROPIUM BROMIDE AND ALBUTEROL SULFATE 2.5; .5 MG/3ML; MG/3ML
SOLUTION RESPIRATORY (INHALATION)
Qty: 18 ML | Refills: 0 | Status: SHIPPED | OUTPATIENT
Start: 2021-12-05 | End: 2022-07-25

## 2021-12-05 RX ORDER — ALBUTEROL SULFATE 90 UG/1
2 AEROSOL, METERED RESPIRATORY (INHALATION) EVERY 4 HOURS PRN
Qty: 8.5 G | Refills: 0 | Status: SHIPPED | OUTPATIENT
Start: 2021-12-05 | End: 2022-07-25

## 2021-12-05 RX ORDER — BUDESONIDE AND FORMOTEROL FUMARATE DIHYDRATE 160; 4.5 UG/1; UG/1
AEROSOL RESPIRATORY (INHALATION)
Qty: 10.2 G | Refills: 0 | Status: SHIPPED | OUTPATIENT
Start: 2021-12-05 | End: 2022-07-25

## 2021-12-05 RX ORDER — PREDNISONE 20 MG/1
20 TABLET ORAL 2 TIMES DAILY WITH MEALS
Qty: 14 TABLET | Refills: 0 | Status: SHIPPED | OUTPATIENT
Start: 2021-12-05 | End: 2021-12-12

## 2021-12-05 RX ORDER — ASPIRIN 81 MG/1
81 TABLET ORAL DAILY
Qty: 30 TABLET | Refills: 0 | Status: SHIPPED | OUTPATIENT
Start: 2021-12-06

## 2021-12-05 RX ORDER — PREDNISONE 20 MG/1
20 TABLET ORAL 2 TIMES DAILY WITH MEALS
Qty: 14 TABLET | Refills: 0 | Status: SHIPPED | OUTPATIENT
Start: 2021-12-05 | End: 2021-12-05

## 2021-12-05 RX ORDER — BUDESONIDE 0.5 MG/2ML
0.5 INHALANT ORAL 2 TIMES DAILY
Qty: 60 EACH | Refills: 1 | Status: SHIPPED | OUTPATIENT
Start: 2021-12-05 | End: 2021-12-05

## 2021-12-05 RX ORDER — PREDNISONE 20 MG/1
TABLET ORAL
Qty: 10 TABLET | Refills: 0 | Status: SHIPPED | OUTPATIENT
Start: 2021-12-05 | End: 2022-07-25

## 2021-12-05 RX ORDER — ASPIRIN 81 MG/1
81 TABLET ORAL DAILY
Qty: 30 TABLET | Refills: 0 | Status: SHIPPED | OUTPATIENT
Start: 2021-12-06 | End: 2021-12-05

## 2021-12-05 RX ORDER — AZITHROMYCIN 250 MG/1
TABLET, FILM COATED ORAL
Qty: 30 TABLET | Refills: 0 | Status: SHIPPED | OUTPATIENT
Start: 2021-12-06 | End: 2021-12-05

## 2021-12-05 RX ORDER — BUDESONIDE 0.5 MG/2ML
0.5 INHALANT ORAL 2 TIMES DAILY
Qty: 60 EACH | Refills: 1 | Status: SHIPPED | OUTPATIENT
Start: 2021-12-05 | End: 2021-12-05 | Stop reason: ALTCHOICE

## 2021-12-05 RX ORDER — DOXYCYCLINE 100 MG/1
CAPSULE ORAL
Qty: 14 CAPSULE | Refills: 0 | OUTPATIENT
Start: 2021-12-05 | End: 2022-03-16

## 2021-12-05 RX ORDER — ALBUTEROL SULFATE 90 UG/1
2 AEROSOL, METERED RESPIRATORY (INHALATION) EVERY 4 HOURS PRN
Qty: 18 G | Refills: 0 | Status: SHIPPED | OUTPATIENT
Start: 2021-12-05 | End: 2021-12-05 | Stop reason: SDUPTHER

## 2021-12-05 RX ORDER — ATORVASTATIN CALCIUM 40 MG/1
40 TABLET, FILM COATED ORAL NIGHTLY
Qty: 30 TABLET | Refills: 0 | Status: SHIPPED | OUTPATIENT
Start: 2021-12-05 | End: 2021-12-05

## 2021-12-05 RX ADMIN — ENOXAPARIN SODIUM 40 MG: 40 INJECTION SUBCUTANEOUS at 10:14

## 2021-12-05 RX ADMIN — ARFORMOTEROL TARTRATE 15 MCG: 15 SOLUTION RESPIRATORY (INHALATION) at 10:28

## 2021-12-05 RX ADMIN — FLUTICASONE PROPIONATE 1 SPRAY: 50 SPRAY, METERED NASAL at 08:50

## 2021-12-05 RX ADMIN — IPRATROPIUM BROMIDE AND ALBUTEROL SULFATE 3 ML: .5; 3 SOLUTION RESPIRATORY (INHALATION) at 06:49

## 2021-12-05 RX ADMIN — AZITHROMYCIN 250 MG: 250 TABLET, FILM COATED ORAL at 08:50

## 2021-12-05 RX ADMIN — METOPROLOL TARTRATE 25 MG: 25 TABLET, FILM COATED ORAL at 08:50

## 2021-12-05 RX ADMIN — PANTOPRAZOLE SODIUM 40 MG: 40 TABLET, DELAYED RELEASE ORAL at 08:50

## 2021-12-05 RX ADMIN — SODIUM CHLORIDE, PRESERVATIVE FREE 10 ML: 5 INJECTION INTRAVENOUS at 08:50

## 2021-12-05 RX ADMIN — BUDESONIDE 0.5 MG: 0.5 INHALANT RESPIRATORY (INHALATION) at 10:28

## 2021-12-05 RX ADMIN — PREDNISONE 20 MG: 20 TABLET ORAL at 08:50

## 2021-12-05 RX ADMIN — IPRATROPIUM BROMIDE AND ALBUTEROL SULFATE 3 ML: .5; 3 SOLUTION RESPIRATORY (INHALATION) at 02:18

## 2021-12-05 RX ADMIN — ASPIRIN 81 MG: 81 TABLET, COATED ORAL at 08:50

## 2021-12-05 RX ADMIN — IPRATROPIUM BROMIDE AND ALBUTEROL SULFATE 3 ML: .5; 3 SOLUTION RESPIRATORY (INHALATION) at 14:39

## 2021-12-05 NOTE — NURSING NOTE
Contacted [facility] for home trilogy set up.  States they will have on call technician call back.

## 2021-12-05 NOTE — DISCHARGE SUMMARY
[facility] HOSPITALISTS DISCHARGE SUMMARY    Patient Identification:  Name:  Emilio Guy  Age:  56 y.o.  Sex:  male  :  1965  MRN:  [id number]  Visit Number:  [id number]    Date of Admission: 2021  Date of Discharge:  2021     PCP: Valarie Garcia APRN    DISCHARGE DIAGNOSIS  #Acute on chronic hypoxemic and hypercarbic respiratory failure  #Acute exacerbation of COPD  #RSV positive  #Heart failure with preserved ejection fraction, not in acute exacerbation  #Chronic alcohol use  #Macrocytosis  #Ongoing tobacco abuse    CONSULTS   Pulmonology    PROCEDURES PERFORMED  None    HOSPITAL COURSE  Patient is a 56 y.o. male presented to [facility] complaining of worsening shortness of breath and diagnosed with an acute exacerbation of COPD.  Please see the admitting history and physical for further details.  He initially required BiPAP therapy and received high-dose corticosteroids.  CT angio was negative for PE, no infiltrates and no evidence of superimposed bacterial pneumonia.  Respiratory panel was positive for RSV which was felt to be etiology of his significant bronchospasms.  He has chronic hypoxic and hypercarbic respiratory failure and wears 3 L at home at baseline, and noted he had previously titrated it up to 5 L at times.  Pulmonology was consulted for evaluation of home NIPPV who recommended AM ABG once he was stable.  On the day of discharge, in a.m. ABG noted a PCO2 of 56 qualifying for home NIPPV-settings listed below.  OPD medication was optimized and he was discharged on an extended prednisone taper and daily azithromycin indefinitely per pulmonology recommendations.  He was instructed to follow-up with his PCP within a week and was referred to pulmonology in [place] to establish care and for further outpatient management.  At the time of discharge, he was hemodynamically stable and was anxious to be discharged.     Home NIPPV settings:  --Patient has  required volume ventilation and all other alternative therapies have been considered and rule out due to the patients severe disease state and potential life threatening condition. Patient needs pressure, volume and EPAP assistance especially as disease state progresses. Discussed that persistent use is needed to reduce risk of CO2 retention and probability of recurrent exacerbations that may lead to recurrent admissions. Patient should use the Trilogy every night and as needed during the day.  --Recommended setting is AE-AVAP  Minimum PEEP of 6, maximum PEEP of 12  Minimum pressure support of 6, maximum pressure support of 20, maximum pressure of 30  Backup rate of 12  Target tidal volume 450 cc    VITAL SIGNS:  Temp:  [98.1 °F (36.7 °C)-98.4 °F (36.9 °C)] 98.4 °F (36.9 °C)  Heart Rate:  [56-89] 83  Resp:  [12-26] 22  BP: (117-159)/(80-99) 144/90  SpO2:  [89 %-98 %] 97 %  on  Flow (L/min):  [2-5] 2;   Device (Oxygen Therapy): nasal cannula; humidified    Body mass index is 26.61 kg/m².  Wt Readings from Last 3 Encounters:   12/05/21 79.4 kg (175 lb)   12/30/17 68 kg (150 lb)       PHYSICAL EXAM:  Constitutional: Older male, nontoxic appearing, sitting up in the bed on NC, NAD  HENT:  Head:  Normocephalic and atraumatic.  Mouth:  Moist mucous membranes.    Eyes:  Conjunctivae and EOM are normal. No scleral icterus.   Cardiovascular:  Normal rate, regular rhythm and normal heart sounds with no murmur. No JVD.   Pulmonary/Chest: still has some wheezing noted bilaterally, unlabored ,no accessory muscle use  Abdominal:  Soft. No distension and no tenderness.  Bowel sounds present. No rebound or guarding.   Musculoskeletal:  No tenderness, and no deformity.  No red or swollen joints anywhere.    Neurological:  Alert and oriented to person, place, and time.  No cranial nerve deficit.   Nonfocal.   Skin:  Skin is warm and dry. No rash noted. No pallor.   Peripheral vascular:  No clubbing, no cyanosis, no edema. Pedal and  tibial pulses 2/4 bilaterally.     DISCHARGE DISPOSITION   Stable    DISCHARGE MEDICATIONS:     Discharge Medications      New Medications      Instructions Start Date   Aspirin Low Dose 81 MG EC tablet  Generic drug: aspirin   81 mg, Oral, Daily   Start Date: December 6, 2021     azithromycin 250 MG tablet  Commonly known as: ZITHROMAX   Take 1 tablet by mouth daily      doxycycline 100 MG capsule  Commonly known as: MONODOX   Take 1 capsule by mouth every 12 hours for 7 days as part of COPD Rescue Kit. (Only Start if in YELLOW ZONE.)      predniSONE 20 MG tablet  Commonly known as: DELTASONE   Take 2 tablets by mouth daily for 5 days as part of COPD Rescue Kit. (Only Start if in YELLOW ZONE.)      predniSONE 20 MG tablet  Commonly known as: DELTASONE   20 mg, Oral, 2 Times Daily With Meals      ProAir  (90 Base) MCG/ACT inhaler  Generic drug: albuterol sulfate HFA   Inhale 2 puffs by mouth Every 4 (Four) Hours As Needed for Wheezing.      Spiriva HandiHaler 18 MCG per inhalation capsule  Generic drug: tiotropium   Place 1 capsule into inhaler and inhale Daily.      Symbicort 160-4.5 MCG/ACT inhaler  Generic drug: budesonide-formoterol   Inhale 2 puffs by mouth (Two) Times a Day.         Changes to Medications      Instructions Start Date   atorvastatin 40 MG tablet  Commonly known as: LIPITOR  What changed:   · medication strength  · how much to take  · when to take this   40 mg, Oral, Nightly      ipratropium-albuterol 0.5-2.5 mg/3 ml nebulizer  Commonly known as: DUO-NEB  What changed: Another medication with the same name was added. Make sure you understand how and when to take each.   3 mL, Nebulization, Every 4 Hours PRN      ipratropium-albuterol 0.5-2.5 mg/3 ml nebulizer  Commonly known as: DUO-NEB  What changed: You were already taking a medication with the same name, and this prescription was added. Make sure you understand how and when to take each.   Use 1 vial via nebulizer every 30 minutes as  needed for shortness of air for up to 6 doses. Part of COPD Rescue Kit. (Only Start if in YELLOW ZONE.)         Continue These Medications      Instructions Start Date   busPIRone 7.5 MG tablet  Commonly known as: BUSPAR   7.5 mg, Oral, 2 Times Daily PRN      fluticasone 50 MCG/ACT nasal spray  Commonly known as: FLONASE   1 spray, Nasal, Daily      ipratropium 17 MCG/ACT inhaler  Commonly known as: ATROVENT HFA   2 puffs, Inhalation, Every 4 Hours PRN      metoprolol tartrate 25 MG tablet  Commonly known as: LOPRESSOR   25 mg, Oral, 2 Times Daily      pantoprazole 40 MG EC tablet  Commonly known as: PROTONIX   40 mg, Oral, Daily               Additional Instructions for the Follow-ups that You Need to Schedule     Discharge Follow-up with PCP   As directed       Currently Documented PCP:    Valarie Garcia APRN    PCP Phone Number:    [phone]     Follow Up Details: [address] [place] [address]         Discharge Follow-up with Specialty: pulmonology in [place]; 2 Weeks   As directed      Specialty: pulmonology in [place]    Follow Up: 2 Weeks    Follow Up Details: COPD-new triology            Follow-up Information     Valarie Garcia APRN .    Specialty: Family Medicine  Why: [address] [place] [address] fu  Contact information:  [address]  [place] [id number]  [phone]                          TEST  RESULTS PENDING AT DISCHARGE  none     CODE STATUS  Code Status and Medical Interventions:   Ordered at: 11/30/21 0905     Level Of Support Discussed With:    Patient     Code Status (Patient has no pulse and is not breathing):    CPR (Attempt to Resuscitate)     Medical Interventions (Patient has pulse or is breathing):    Full Support       Thomas Darden DO  [facility] Hospitalist  12/05/21  16:26 EST    Please note that this discharge summary required more than 30 minutes to complete.

## 2021-12-05 NOTE — NURSING NOTE
Spoke with Skinny at [facility]. Wants pt to contact [facility] once he is home and they will come out to do trilogy set up at that time.

## 2021-12-05 NOTE — NURSING NOTE
Discharge teaching provided to both patient and his mother regarding discharge medications, COPD rescue kit, diagnoses, and smoking cessation. Educational handouts also provided. Patient verbalizes understanding of instructions and any questions answered. Patient reports he wants to be discharged and wants to go home. Patient waiting on medications from pharmacy at this time.

## 2021-12-05 NOTE — PLAN OF CARE
Goal Outcome Evaluation:   Pt alert and oriented x 4. VSS and afebrile. On 5L NC tolerating well. Pt has rested during most of this shift. No complaints at this time. Hoping to go home in the am. Will continue to monitor.

## 2021-12-06 ENCOUNTER — READMISSION MANAGEMENT (OUTPATIENT)
Dept: CALL CENTER | Facility: HOSPITAL | Age: 56
End: 2021-12-06

## 2021-12-06 NOTE — OUTREACH NOTE
Prep Survey      Responses   [facility] facility patient discharged from? [place]   Is LACE score < 7 ? No   Emergency Room discharge w/ pulse ox? No   Eligibility Not Eligible   What are the reasons patient is not eligible? Other  [chronic alcohol use]   Does the patient have one of the following disease processes/diagnoses(primary or secondary)? COPD/Pneumonia   Prep survey completed? Yes          Jalyn Loera RN

## 2022-03-10 ENCOUNTER — APPOINTMENT (OUTPATIENT)
Dept: CT IMAGING | Facility: HOSPITAL | Age: 57
End: 2022-03-10

## 2022-03-10 ENCOUNTER — APPOINTMENT (OUTPATIENT)
Dept: GENERAL RADIOLOGY | Facility: HOSPITAL | Age: 57
End: 2022-03-10

## 2022-03-10 ENCOUNTER — HOSPITAL ENCOUNTER (EMERGENCY)
Facility: HOSPITAL | Age: 57
Discharge: HOME | End: 2022-03-11
Attending: EMERGENCY MEDICINE | Admitting: EMERGENCY MEDICINE

## 2022-03-10 DIAGNOSIS — R07.89 ATYPICAL CHEST PAIN: Primary | ICD-10-CM

## 2022-03-10 DIAGNOSIS — J43.9 PULMONARY EMPHYSEMA, UNSPECIFIED EMPHYSEMA TYPE: ICD-10-CM

## 2022-03-10 LAB
ALBUMIN SERPL-MCNC: 4.9 G/DL (ref 3.5–5.2)
ALBUMIN/GLOB SERPL: 1.6 G/DL
ALP SERPL-CCNC: 76 U/L (ref 39–117)
ALT SERPL W P-5'-P-CCNC: 35 U/L (ref 1–41)
AMPHET+METHAMPHET UR QL: NEGATIVE
AMPHETAMINES UR QL: NEGATIVE
ANION GAP SERPL CALCULATED.3IONS-SCNC: 14.5 MMOL/L (ref 5–15)
AST SERPL-CCNC: 53 U/L (ref 1–40)
BARBITURATES UR QL SCN: NEGATIVE
BASOPHILS # BLD AUTO: 0.08 10*3/MM3 (ref 0–0.2)
BASOPHILS NFR BLD AUTO: 0.8 % (ref 0–1.5)
BENZODIAZ UR QL SCN: NEGATIVE
BILIRUB SERPL-MCNC: 0.2 MG/DL (ref 0–1.2)
BILIRUB UR QL STRIP: NEGATIVE
BUN SERPL-MCNC: 5 MG/DL (ref 6–20)
BUN/CREAT SERPL: 6.4 (ref 7–25)
BUPRENORPHINE SERPL-MCNC: NEGATIVE NG/ML
CALCIUM SPEC-SCNC: 9.9 MG/DL (ref 8.6–10.5)
CANNABINOIDS SERPL QL: NEGATIVE
CHLORIDE SERPL-SCNC: 97 MMOL/L (ref 98–107)
CLARITY UR: CLEAR
CO2 SERPL-SCNC: 24.5 MMOL/L (ref 22–29)
COCAINE UR QL: NEGATIVE
COLOR UR: YELLOW
CREAT SERPL-MCNC: 0.78 MG/DL (ref 0.76–1.27)
DEPRECATED RDW RBC AUTO: 52.5 FL (ref 37–54)
EGFRCR SERPLBLD CKD-EPI 2021: 105.3 ML/MIN/1.73
EOSINOPHIL # BLD AUTO: 0.25 10*3/MM3 (ref 0–0.4)
EOSINOPHIL NFR BLD AUTO: 2.4 % (ref 0.3–6.2)
ERYTHROCYTE [DISTWIDTH] IN BLOOD BY AUTOMATED COUNT: 14.6 % (ref 12.3–15.4)
ETHANOL BLD-MCNC: 71 MG/DL (ref 0–10)
ETHANOL UR QL: 0.07 %
FLUAV RNA RESP QL NAA+PROBE: NOT DETECTED
FLUBV RNA RESP QL NAA+PROBE: NOT DETECTED
GLOBULIN UR ELPH-MCNC: 3 GM/DL
GLUCOSE SERPL-MCNC: 97 MG/DL (ref 65–99)
GLUCOSE UR STRIP-MCNC: NEGATIVE MG/DL
HCT VFR BLD AUTO: 44.1 % (ref 37.5–51)
HGB BLD-MCNC: 14.4 G/DL (ref 13–17.7)
HGB UR QL STRIP.AUTO: NEGATIVE
HOLD SPECIMEN: NORMAL
HOLD SPECIMEN: NORMAL
IMM GRANULOCYTES # BLD AUTO: 0.11 10*3/MM3 (ref 0–0.05)
IMM GRANULOCYTES NFR BLD AUTO: 1.1 % (ref 0–0.5)
KETONES UR QL STRIP: NEGATIVE
LEUKOCYTE ESTERASE UR QL STRIP.AUTO: NEGATIVE
LYMPHOCYTES # BLD AUTO: 2.82 10*3/MM3 (ref 0.7–3.1)
LYMPHOCYTES NFR BLD AUTO: 27.5 % (ref 19.6–45.3)
MAGNESIUM SERPL-MCNC: 2.3 MG/DL (ref 1.6–2.6)
MCH RBC QN AUTO: 31.8 PG (ref 26.6–33)
MCHC RBC AUTO-ENTMCNC: 32.7 G/DL (ref 31.5–35.7)
MCV RBC AUTO: 97.4 FL (ref 79–97)
METHADONE UR QL SCN: NEGATIVE
MONOCYTES # BLD AUTO: 0.99 10*3/MM3 (ref 0.1–0.9)
MONOCYTES NFR BLD AUTO: 9.7 % (ref 5–12)
NEUTROPHILS NFR BLD AUTO: 5.99 10*3/MM3 (ref 1.7–7)
NEUTROPHILS NFR BLD AUTO: 58.5 % (ref 42.7–76)
NITRITE UR QL STRIP: NEGATIVE
NRBC BLD AUTO-RTO: 0 /100 WBC (ref 0–0.2)
NT-PROBNP SERPL-MCNC: 24 PG/ML (ref 0–900)
OPIATES UR QL: NEGATIVE
OXYCODONE UR QL SCN: NEGATIVE
PCP UR QL SCN: NEGATIVE
PH UR STRIP.AUTO: 5.5 [PH] (ref 5–8)
PLATELET # BLD AUTO: 413 10*3/MM3 (ref 140–450)
PMV BLD AUTO: 9.4 FL (ref 6–12)
POTASSIUM SERPL-SCNC: 4.3 MMOL/L (ref 3.5–5.2)
PROPOXYPH UR QL: NEGATIVE
PROT SERPL-MCNC: 7.9 G/DL (ref 6–8.5)
PROT UR QL STRIP: NEGATIVE
RBC # BLD AUTO: 4.53 10*6/MM3 (ref 4.14–5.8)
SARS-COV-2 RNA RESP QL NAA+PROBE: NOT DETECTED
SODIUM SERPL-SCNC: 136 MMOL/L (ref 136–145)
SP GR UR STRIP: 1.01 (ref 1–1.03)
TRICYCLICS UR QL SCN: NEGATIVE
TROPONIN T SERPL-MCNC: 0.01 NG/ML (ref 0–0.03)
TROPONIN T SERPL-MCNC: <0.01 NG/ML (ref 0–0.03)
UROBILINOGEN UR QL STRIP: NORMAL
WBC NRBC COR # BLD: 10.24 10*3/MM3 (ref 3.4–10.8)
WHOLE BLOOD HOLD SPECIMEN: NORMAL
WHOLE BLOOD HOLD SPECIMEN: NORMAL

## 2022-03-10 PROCEDURE — 94799 UNLISTED PULMONARY SVC/PX: CPT

## 2022-03-10 PROCEDURE — 99284 EMERGENCY DEPT VISIT MOD MDM: CPT

## 2022-03-10 PROCEDURE — 36415 COLL VENOUS BLD VENIPUNCTURE: CPT

## 2022-03-10 PROCEDURE — 87636 SARSCOV2 & INF A&B AMP PRB: CPT | Performed by: PHYSICIAN ASSISTANT

## 2022-03-10 PROCEDURE — 84484 ASSAY OF TROPONIN QUANT: CPT | Performed by: PHYSICIAN ASSISTANT

## 2022-03-10 PROCEDURE — 25010000002 LORAZEPAM PER 2 MG: Performed by: EMERGENCY MEDICINE

## 2022-03-10 PROCEDURE — 81003 URINALYSIS AUTO W/O SCOPE: CPT | Performed by: PHYSICIAN ASSISTANT

## 2022-03-10 PROCEDURE — 83880 ASSAY OF NATRIURETIC PEPTIDE: CPT | Performed by: PHYSICIAN ASSISTANT

## 2022-03-10 PROCEDURE — 85025 COMPLETE CBC W/AUTO DIFF WBC: CPT | Performed by: PHYSICIAN ASSISTANT

## 2022-03-10 PROCEDURE — 93010 ELECTROCARDIOGRAM REPORT: CPT | Performed by: INTERNAL MEDICINE

## 2022-03-10 PROCEDURE — 0 IOPAMIDOL PER 1 ML: Performed by: EMERGENCY MEDICINE

## 2022-03-10 PROCEDURE — 96374 THER/PROPH/DIAG INJ IV PUSH: CPT

## 2022-03-10 PROCEDURE — 80306 DRUG TEST PRSMV INSTRMNT: CPT | Performed by: PHYSICIAN ASSISTANT

## 2022-03-10 PROCEDURE — 80053 COMPREHEN METABOLIC PANEL: CPT | Performed by: PHYSICIAN ASSISTANT

## 2022-03-10 PROCEDURE — 94640 AIRWAY INHALATION TREATMENT: CPT

## 2022-03-10 PROCEDURE — 71275 CT ANGIOGRAPHY CHEST: CPT

## 2022-03-10 PROCEDURE — 71045 X-RAY EXAM CHEST 1 VIEW: CPT

## 2022-03-10 PROCEDURE — 82077 ASSAY SPEC XCP UR&BREATH IA: CPT | Performed by: PHYSICIAN ASSISTANT

## 2022-03-10 PROCEDURE — 93005 ELECTROCARDIOGRAM TRACING: CPT | Performed by: PHYSICIAN ASSISTANT

## 2022-03-10 PROCEDURE — 83735 ASSAY OF MAGNESIUM: CPT | Performed by: PHYSICIAN ASSISTANT

## 2022-03-10 RX ORDER — SODIUM CHLORIDE 0.9 % (FLUSH) 0.9 %
10 SYRINGE (ML) INJECTION AS NEEDED
Status: DISCONTINUED | OUTPATIENT
Start: 2022-03-10 | End: 2022-03-11 | Stop reason: HOSPADM

## 2022-03-10 RX ORDER — LORAZEPAM 2 MG/ML
1 INJECTION INTRAMUSCULAR ONCE
Status: COMPLETED | OUTPATIENT
Start: 2022-03-10 | End: 2022-03-10

## 2022-03-10 RX ORDER — IPRATROPIUM BROMIDE AND ALBUTEROL SULFATE 2.5; .5 MG/3ML; MG/3ML
3 SOLUTION RESPIRATORY (INHALATION) ONCE
Status: COMPLETED | OUTPATIENT
Start: 2022-03-10 | End: 2022-03-10

## 2022-03-10 RX ADMIN — LORAZEPAM 1 MG: 2 INJECTION INTRAMUSCULAR at 21:48

## 2022-03-10 RX ADMIN — IOPAMIDOL 80 ML: 755 INJECTION, SOLUTION INTRAVENOUS at 22:38

## 2022-03-10 RX ADMIN — SODIUM CHLORIDE 1000 ML: 9 INJECTION, SOLUTION INTRAVENOUS at 19:28

## 2022-03-10 RX ADMIN — IPRATROPIUM BROMIDE AND ALBUTEROL SULFATE 3 ML: .5; 3 SOLUTION RESPIRATORY (INHALATION) at 23:19

## 2022-03-10 NOTE — ED PROVIDER NOTES
Subjective   Patient is a 56-year-old male with history of congestive heart failure and COPD that presents to the ED with complaints of midsternal chest pain as well as shortness of breath and nausea that started approximately 6 hours ago.  EMS administered aspirin 324 mg and he had an albuterol treatment at home just prior to arrival.  He does wear oxygen chronically at 3 L.  He is also has a history of alcoholism and last drink early this morning.  He denies fever, chills, vomiting, headache, dizziness, abdominal pain, dysuria, diarrhea, or constipation.      History provided by:  Patient   used: No        Review of Systems   Constitutional: Negative.  Negative for chills, diaphoresis, fatigue and fever.   HENT: Negative.  Negative for congestion, drooling, ear discharge, ear pain, facial swelling, postnasal drip, rhinorrhea, sinus pressure, sinus pain, sneezing, sore throat, tinnitus and trouble swallowing.    Eyes: Negative.  Negative for photophobia, pain, discharge, redness and itching.   Respiratory: Negative.  Negative for cough, shortness of breath and wheezing.    Cardiovascular: Positive for chest pain.   Gastrointestinal: Positive for nausea. Negative for abdominal pain, constipation, diarrhea and vomiting.   Endocrine: Negative.    Genitourinary: Negative.  Negative for difficulty urinating, dysuria, flank pain and frequency.   Musculoskeletal: Negative.  Negative for arthralgias, back pain, gait problem, joint swelling, myalgias, neck pain and neck stiffness.   Skin: Negative.    Neurological: Negative.  Negative for dizziness, tremors, seizures, syncope, facial asymmetry, speech difficulty, light-headedness, numbness and headaches.   Psychiatric/Behavioral: Negative.  Negative for confusion.   All other systems reviewed and are negative.      Past Medical History:   Diagnosis Date   • CHF (congestive heart failure) ([facility])    • COPD (chronic obstructive pulmonary disease) ([facility])         Allergies   Allergen Reactions   • Penicillins Hives       No past surgical history on file.    Family History   Family history unknown: Yes       Social History     Socioeconomic History   • Marital status: Single   Tobacco Use   • Smoking status: Current Every Day Smoker     Packs/day: 1.50     Years: 25.00     Pack years: 37.50   • Smokeless tobacco: Never Used   Substance and Sexual Activity   • Alcohol use: Yes     Comment: drinks 1-2 beers everyday    • Drug use: No   • Sexual activity: Defer           Objective   Physical Exam  Vitals and nursing note reviewed.   Constitutional:       General: He is not in acute distress.     Appearance: He is well-developed. He is not diaphoretic.   HENT:      Head: Normocephalic and atraumatic.      Right Ear: External ear normal.      Left Ear: External ear normal.      Nose: Nose normal.      Mouth/Throat:      Mouth: Mucous membranes are moist.      Pharynx: Oropharynx is clear.   Eyes:      Extraocular Movements: Extraocular movements intact.      Conjunctiva/sclera: Conjunctivae normal.      Pupils: Pupils are equal, round, and reactive to light.   Neck:      Vascular: No JVD.      Trachea: No tracheal deviation.   Cardiovascular:      Rate and Rhythm: Normal rate and regular rhythm.      Pulses: Normal pulses.           Carotid pulses are 2+ on the right side and 2+ on the left side.       Radial pulses are 2+ on the right side and 2+ on the left side.        Dorsalis pedis pulses are 2+ on the right side and 2+ on the left side.        Posterior tibial pulses are 2+ on the right side and 2+ on the left side.      Heart sounds: Murmur heard.   Pulmonary:      Effort: Pulmonary effort is normal. No respiratory distress.      Breath sounds: Normal breath sounds. No wheezing.   Abdominal:      General: Bowel sounds are normal. There is no distension.      Palpations: Abdomen is soft.      Tenderness: There is no abdominal tenderness. There is no guarding or  rebound.   Musculoskeletal:         General: No deformity. Normal range of motion.      Cervical back: Normal range of motion and neck supple.   Skin:     General: Skin is warm and dry.      Coloration: Skin is not pale.      Findings: No erythema or rash.   Neurological:      General: No focal deficit present.      Mental Status: He is alert and oriented to person, place, and time.      Cranial Nerves: No cranial nerve deficit.   Psychiatric:         Mood and Affect: Mood normal.         Behavior: Behavior normal.         Thought Content: Thought content normal.         Procedures           ED Course  ED Course as of 03/10/22 2311   Thu Mar 10, 2022   1827 ECG 18:11 Accelerated junctional rhythm, rate 99. QT/QTc 334/428 [AURELIANO]   1915 XR Chest 1 View  IMPRESSION:  Pulmonary hyperinflation with patchy hyperlucency suggesting emphysema. No acute findings.     Signer Name: FEROZ Blanton MD   Signed: 3/10/2022 7:10 PM []   1581 Endorsed to Dr. Lomax []      ED Course User Index  [AURELIANO] Tacho Lomax MD  [] Leeann Terrell PA-C                                                 [facility]    Final diagnoses:   Atypical chest pain   Pulmonary emphysema, unspecified emphysema type (HCC)       ED Disposition  ED Disposition     ED Disposition   Discharge    Condition   Stable    Comment   --             Valarie Garcia, APRN  [address]  [facility] [address]  [phone]    Schedule an appointment as soon as possible for a visit   If symptoms worsen         Medication List      No changes were made to your prescriptions during this visit.          Tacho Lomax MD  03/10/22 2311

## 2022-03-11 VITALS
DIASTOLIC BLOOD PRESSURE: 83 MMHG | BODY MASS INDEX: 23.54 KG/M2 | RESPIRATION RATE: 18 BRPM | TEMPERATURE: 99.1 F | HEART RATE: 93 BPM | OXYGEN SATURATION: 99 % | HEIGHT: 67 IN | SYSTOLIC BLOOD PRESSURE: 136 MMHG | WEIGHT: 150 LBS

## 2022-03-11 LAB
QT INTERVAL: 334 MS
QTC INTERVAL: 428 MS

## 2022-03-16 ENCOUNTER — HOSPITAL ENCOUNTER (EMERGENCY)
Facility: HOSPITAL | Age: 57
Discharge: HOME | End: 2022-03-16
Attending: STUDENT IN AN ORGANIZED HEALTH CARE EDUCATION/TRAINING PROGRAM | Admitting: STUDENT IN AN ORGANIZED HEALTH CARE EDUCATION/TRAINING PROGRAM

## 2022-03-16 ENCOUNTER — APPOINTMENT (OUTPATIENT)
Dept: GENERAL RADIOLOGY | Facility: HOSPITAL | Age: 57
End: 2022-03-16

## 2022-03-16 VITALS
BODY MASS INDEX: 19.99 KG/M2 | RESPIRATION RATE: 24 BRPM | WEIGHT: 135 LBS | OXYGEN SATURATION: 98 % | HEART RATE: 75 BPM | HEIGHT: 69 IN | SYSTOLIC BLOOD PRESSURE: 147 MMHG | TEMPERATURE: 99.4 F | DIASTOLIC BLOOD PRESSURE: 82 MMHG

## 2022-03-16 DIAGNOSIS — J44.1 COPD EXACERBATION: Primary | ICD-10-CM

## 2022-03-16 LAB
ALBUMIN SERPL-MCNC: 4.57 G/DL (ref 3.5–5.2)
ALBUMIN/GLOB SERPL: 1.5 G/DL
ALP SERPL-CCNC: 84 U/L (ref 39–117)
ALT SERPL W P-5'-P-CCNC: 28 U/L (ref 1–41)
ANION GAP SERPL CALCULATED.3IONS-SCNC: 12.3 MMOL/L (ref 5–15)
AST SERPL-CCNC: 49 U/L (ref 1–40)
BASOPHILS # BLD AUTO: 0.15 10*3/MM3 (ref 0–0.2)
BASOPHILS NFR BLD AUTO: 1.2 % (ref 0–1.5)
BILIRUB SERPL-MCNC: 0.4 MG/DL (ref 0–1.2)
BUN SERPL-MCNC: 7 MG/DL (ref 6–20)
BUN/CREAT SERPL: 11.1 (ref 7–25)
CALCIUM SPEC-SCNC: 9.9 MG/DL (ref 8.6–10.5)
CHLORIDE SERPL-SCNC: 91 MMOL/L (ref 98–107)
CO2 SERPL-SCNC: 27.7 MMOL/L (ref 22–29)
CREAT SERPL-MCNC: 0.63 MG/DL (ref 0.76–1.27)
CRP SERPL-MCNC: 2 MG/DL (ref 0–0.5)
D-LACTATE SERPL-SCNC: 1.1 MMOL/L (ref 0.5–2)
DEPRECATED RDW RBC AUTO: 49.9 FL (ref 37–54)
EGFRCR SERPLBLD CKD-EPI 2021: 112.3 ML/MIN/1.73
EOSINOPHIL # BLD AUTO: 0.15 10*3/MM3 (ref 0–0.4)
EOSINOPHIL NFR BLD AUTO: 1.2 % (ref 0.3–6.2)
ERYTHROCYTE [DISTWIDTH] IN BLOOD BY AUTOMATED COUNT: 14 % (ref 12.3–15.4)
FLUAV RNA RESP QL NAA+PROBE: NOT DETECTED
FLUBV RNA RESP QL NAA+PROBE: NOT DETECTED
GLOBULIN UR ELPH-MCNC: 3.1 GM/DL
GLUCOSE SERPL-MCNC: 101 MG/DL (ref 65–99)
HCT VFR BLD AUTO: 41.8 % (ref 37.5–51)
HGB BLD-MCNC: 14.1 G/DL (ref 13–17.7)
IMM GRANULOCYTES # BLD AUTO: 0.11 10*3/MM3 (ref 0–0.05)
IMM GRANULOCYTES NFR BLD AUTO: 0.9 % (ref 0–0.5)
LYMPHOCYTES # BLD AUTO: 2.19 10*3/MM3 (ref 0.7–3.1)
LYMPHOCYTES NFR BLD AUTO: 17 % (ref 19.6–45.3)
MAGNESIUM SERPL-MCNC: 2.2 MG/DL (ref 1.6–2.6)
MCH RBC QN AUTO: 32.3 PG (ref 26.6–33)
MCHC RBC AUTO-ENTMCNC: 33.7 G/DL (ref 31.5–35.7)
MCV RBC AUTO: 95.9 FL (ref 79–97)
MONOCYTES # BLD AUTO: 1.01 10*3/MM3 (ref 0.1–0.9)
MONOCYTES NFR BLD AUTO: 7.8 % (ref 5–12)
NEUTROPHILS NFR BLD AUTO: 71.9 % (ref 42.7–76)
NEUTROPHILS NFR BLD AUTO: 9.29 10*3/MM3 (ref 1.7–7)
NRBC BLD AUTO-RTO: 0 /100 WBC (ref 0–0.2)
PLATELET # BLD AUTO: 377 10*3/MM3 (ref 140–450)
PMV BLD AUTO: 9.1 FL (ref 6–12)
POTASSIUM SERPL-SCNC: 4.9 MMOL/L (ref 3.5–5.2)
PROT SERPL-MCNC: 7.7 G/DL (ref 6–8.5)
QT INTERVAL: 376 MS
QTC INTERVAL: 423 MS
RBC # BLD AUTO: 4.36 10*6/MM3 (ref 4.14–5.8)
SARS-COV-2 RNA RESP QL NAA+PROBE: NOT DETECTED
SODIUM SERPL-SCNC: 131 MMOL/L (ref 136–145)
WBC NRBC COR # BLD: 12.9 10*3/MM3 (ref 3.4–10.8)

## 2022-03-16 PROCEDURE — 83605 ASSAY OF LACTIC ACID: CPT | Performed by: STUDENT IN AN ORGANIZED HEALTH CARE EDUCATION/TRAINING PROGRAM

## 2022-03-16 PROCEDURE — 93005 ELECTROCARDIOGRAM TRACING: CPT | Performed by: STUDENT IN AN ORGANIZED HEALTH CARE EDUCATION/TRAINING PROGRAM

## 2022-03-16 PROCEDURE — 86140 C-REACTIVE PROTEIN: CPT | Performed by: STUDENT IN AN ORGANIZED HEALTH CARE EDUCATION/TRAINING PROGRAM

## 2022-03-16 PROCEDURE — 96365 THER/PROPH/DIAG IV INF INIT: CPT

## 2022-03-16 PROCEDURE — 25010000002 AZITHROMYCIN PER 500 MG: Performed by: STUDENT IN AN ORGANIZED HEALTH CARE EDUCATION/TRAINING PROGRAM

## 2022-03-16 PROCEDURE — 94799 UNLISTED PULMONARY SVC/PX: CPT

## 2022-03-16 PROCEDURE — 94640 AIRWAY INHALATION TREATMENT: CPT

## 2022-03-16 PROCEDURE — 80053 COMPREHEN METABOLIC PANEL: CPT | Performed by: STUDENT IN AN ORGANIZED HEALTH CARE EDUCATION/TRAINING PROGRAM

## 2022-03-16 PROCEDURE — 99284 EMERGENCY DEPT VISIT MOD MDM: CPT

## 2022-03-16 PROCEDURE — 71045 X-RAY EXAM CHEST 1 VIEW: CPT | Performed by: RADIOLOGY

## 2022-03-16 PROCEDURE — 85025 COMPLETE CBC W/AUTO DIFF WBC: CPT | Performed by: STUDENT IN AN ORGANIZED HEALTH CARE EDUCATION/TRAINING PROGRAM

## 2022-03-16 PROCEDURE — 71045 X-RAY EXAM CHEST 1 VIEW: CPT

## 2022-03-16 PROCEDURE — 25010000002 METHYLPREDNISOLONE PER 125 MG: Performed by: STUDENT IN AN ORGANIZED HEALTH CARE EDUCATION/TRAINING PROGRAM

## 2022-03-16 PROCEDURE — 83735 ASSAY OF MAGNESIUM: CPT | Performed by: STUDENT IN AN ORGANIZED HEALTH CARE EDUCATION/TRAINING PROGRAM

## 2022-03-16 PROCEDURE — 87636 SARSCOV2 & INF A&B AMP PRB: CPT | Performed by: STUDENT IN AN ORGANIZED HEALTH CARE EDUCATION/TRAINING PROGRAM

## 2022-03-16 PROCEDURE — 96375 TX/PRO/DX INJ NEW DRUG ADDON: CPT

## 2022-03-16 RX ORDER — DOXYCYCLINE 100 MG/1
100 CAPSULE ORAL ONCE
Status: COMPLETED | OUTPATIENT
Start: 2022-03-16 | End: 2022-03-16

## 2022-03-16 RX ORDER — ACETYLCYSTEINE 200 MG/ML
3 SOLUTION ORAL; RESPIRATORY (INHALATION) ONCE
Status: COMPLETED | OUTPATIENT
Start: 2022-03-16 | End: 2022-03-16

## 2022-03-16 RX ORDER — IPRATROPIUM BROMIDE AND ALBUTEROL SULFATE 2.5; .5 MG/3ML; MG/3ML
3 SOLUTION RESPIRATORY (INHALATION) ONCE
Status: COMPLETED | OUTPATIENT
Start: 2022-03-16 | End: 2022-03-16

## 2022-03-16 RX ORDER — METHYLPREDNISOLONE SODIUM SUCCINATE 125 MG/2ML
125 INJECTION, POWDER, LYOPHILIZED, FOR SOLUTION INTRAMUSCULAR; INTRAVENOUS ONCE
Status: COMPLETED | OUTPATIENT
Start: 2022-03-16 | End: 2022-03-16

## 2022-03-16 RX ORDER — DOXYCYCLINE 100 MG/1
100 CAPSULE ORAL 2 TIMES DAILY
Qty: 14 CAPSULE | Refills: 0 | Status: SHIPPED | OUTPATIENT
Start: 2022-03-16 | End: 2022-03-23

## 2022-03-16 RX ADMIN — METHYLPREDNISOLONE SODIUM SUCCINATE 125 MG: 125 INJECTION, POWDER, FOR SOLUTION INTRAMUSCULAR; INTRAVENOUS at 15:28

## 2022-03-16 RX ADMIN — DOXYCYCLINE 100 MG: 100 CAPSULE ORAL at 17:16

## 2022-03-16 RX ADMIN — SODIUM CHLORIDE 500 ML: 9 INJECTION, SOLUTION INTRAVENOUS at 15:27

## 2022-03-16 RX ADMIN — AZITHROMYCIN MONOHYDRATE 500 MG: 500 INJECTION, POWDER, LYOPHILIZED, FOR SOLUTION INTRAVENOUS at 15:33

## 2022-03-16 RX ADMIN — IPRATROPIUM BROMIDE AND ALBUTEROL SULFATE 3 ML: .5; 3 SOLUTION RESPIRATORY (INHALATION) at 16:13

## 2022-03-16 RX ADMIN — ACETYLCYSTEINE 3 ML: 200 SOLUTION ORAL; RESPIRATORY (INHALATION) at 16:13

## 2022-03-16 NOTE — ED NOTES
Mother arrived to transport patient home but asking to speak with Dr. Parker as she states patient is unable to care for himself at home.

## 2022-03-16 NOTE — ED PROVIDER NOTES
[facility]  emergency department encounter        Pt Name: Emilio Guy  MRN: [id number]  Birthdate 1965  Date of evaluation: 3/16/2022    Chief Complaint   Patient presents with   • Shortness of Breath             HISTORY OF PRESENT ILLNESS:   Emilio Guy is a 56 y.o. male PMH significant for COPD, CHF presents for shortness of breath.  Symptoms progressively worsened over the past few days.  Endorses productive cough with white sputum, only minimally increased from baseline.  No recent leg swelling.  Denies fever chills respiratory complaints otherwise.  Denies chest pain abdominal pain and other GI complaints.    Patient on 4 L nasal cannula baseline at home.  Arrives saturating in the mid upper 90s on 97%, in mild respiratory distress.    No other exacerbating or alleviating factors other than as noted above.  Severity: Severe    PCP: Valarie Garcia APRN          REVIEW OF SYSTEMS:     Review of Systems   Constitutional: Negative for fever.   HENT: Negative for congestion and rhinorrhea.    Eyes: Negative for visual disturbance.   Respiratory: Positive for cough and shortness of breath.    Cardiovascular: Negative for chest pain and leg swelling.   Gastrointestinal: Negative for abdominal pain, nausea and vomiting.   Genitourinary: Negative for dysuria.   Musculoskeletal: Negative for myalgias.   Skin: Negative for rash.   Neurological: Negative for headaches.   Psychiatric/Behavioral: Negative for confusion.       Review of systems otherwise as per HPI.          PREVIOUS HISTORY:         Past Medical History:   Diagnosis Date   • CHF (congestive heart failure) (HCC)    • COPD (chronic obstructive pulmonary disease) (HCC)          No past surgical history on file.        Social History     Socioeconomic History   • Marital status: Single   Tobacco Use   • Smoking status: Current Every Day Smoker     Packs/day: 1.50     Years: 25.00     Pack years: 37.50   • Smokeless tobacco: Never  Used   Substance and Sexual Activity   • Alcohol use: Yes     Comment: drinks 1-2 beers everyday    • Drug use: No   • Sexual activity: Defer           Family History   Family history unknown: Yes         Current Outpatient Medications   Medication Instructions   • albuterol sulfate HFA (Proventil HFA) 108 (90 Base) MCG/ACT inhaler Inhale 2 puffs by mouth Every 4 (Four) Hours As Needed for Wheezing.   • Aspirin Low Dose 81 mg, Oral, Daily   • atorvastatin (LIPITOR) 40 mg, Oral, Nightly   • azithromycin (ZITHROMAX) 250 MG tablet Take 1 tablet by mouth daily   • budesonide-formoterol (SYMBICORT) 160-4.5 MCG/ACT inhaler Inhale 2 puffs by mouth (Two) Times a Day.   • busPIRone (BUSPAR) 7.5 mg, Oral, 2 Times Daily PRN   • doxycycline (MONODOX) 100 mg, Oral, 2 Times Daily   • fluticasone (FLONASE) 50 MCG/ACT nasal spray 1 spray, Nasal, Daily   • ipratropium (ATROVENT HFA) 17 MCG/ACT inhaler 2 puffs, Inhalation, Every 4 Hours PRN   • ipratropium-albuterol (DUO-NEB) 0.5-2.5 mg/3 ml nebulizer 3 mL, Nebulization, Every 4 Hours PRN   • ipratropium-albuterol (DUO-NEB) 0.5-2.5 mg/3 ml nebulizer Use 1 vial via nebulizer every 30 minutes as needed for shortness of air for up to 6 doses. Part of COPD Rescue Kit. (Only Start if in YELLOW ZONE.)   • metoprolol tartrate (LOPRESSOR) 25 mg, Oral, 2 Times Daily   • pantoprazole (PROTONIX) 40 mg, Oral, Daily   • predniSONE (DELTASONE) 20 MG tablet Take 2 tablets by mouth daily for 5 days as part of COPD Rescue Kit. (Only Start if in YELLOW ZONE.)   • tiotropium (Spiriva HandiHaler) 18 MCG per inhalation capsule Place 1 capsule into inhaler and inhale Daily.         Allergies:  Penicillins    Medications, allergies and past medical, surgical, family, and social history reviewed.        PHYSICAL EXAM:     Physical Exam  Vitals and nursing note reviewed.   Constitutional:       General: He is not in acute distress.     Appearance: Normal appearance.   HENT:      Head: Normocephalic and  atraumatic.   Eyes:      Extraocular Movements: Extraocular movements intact.      Conjunctiva/sclera: Conjunctivae normal.   Cardiovascular:      Rate and Rhythm: Normal rate and regular rhythm.   Pulmonary:      Effort: Respiratory distress present.      Breath sounds: No stridor. Wheezing present. No rhonchi or rales.      Comments: Diffuse bilateral wheeze, significant expiratory prolongation  Abdominal:      General: Abdomen is flat. There is no distension.   Musculoskeletal:         General: No deformity. Normal range of motion.      Cervical back: Normal range of motion. No rigidity.   Neurological:      General: No focal deficit present.      Mental Status: He is alert and oriented to person, place, and time.   Psychiatric:         Mood and Affect: Mood normal.         Behavior: Behavior normal.             COMPLETED DIAGNOSTIC STUDIES AND INTERVENTIONS:     Lab Results (last 24 hours)     Procedure Component Value Units Date/Time    CBC & Differential [[id number]]  (Abnormal) Collected: 03/16/22 1520    Specimen: Blood from Arm, Right Updated: 03/16/22 1527    Narrative:      The following orders were created for panel order CBC & Differential.  Procedure                               Abnormality         Status                     ---------                               -----------         ------                     CBC Auto Differential[[id number]]        Abnormal            Final result                 Please view results for these tests on the individual orders.    Comprehensive Metabolic Panel [[id number]]  (Abnormal) Collected: 03/16/22 1520    Specimen: Blood from Arm, Right Updated: 03/16/22 1554     Glucose 101 mg/dL      BUN 7 mg/dL      Creatinine 0.63 mg/dL      Sodium 131 mmol/L      Potassium 4.9 mmol/L      Comment: Slight hemolysis detected by analyzer. Results may be affected.        Chloride 91 mmol/L      CO2 27.7 mmol/L      Calcium 9.9 mg/dL      Total Protein 7.7 g/dL      Albumin 4.57  g/dL      ALT (SGPT) 28 U/L      AST (SGOT) 49 U/L      Alkaline Phosphatase 84 U/L      Total Bilirubin 0.4 mg/dL      Globulin 3.1 gm/dL      A/G Ratio 1.5 g/dL      BUN/Creatinine Ratio 11.1     Anion Gap 12.3 mmol/L      eGFR 112.3 mL/min/1.73      Comment: National Kidney Foundation and American Society of Nephrology (ASN) Task Force recommended calculation based on the Chronic Kidney Disease Epidemiology Collaboration (CKD-EPI) equation refit without adjustment for race.       Narrative:      GFR Normal >60  Chronic Kidney Disease <60  Kidney Failure <15      Lactic Acid, Plasma [[id number]]  (Normal) Collected: 03/16/22 1520    Specimen: Blood from Arm, Right Updated: 03/16/22 1552     Lactate 1.1 mmol/L     C-reactive Protein [[id number]]  (Abnormal) Collected: 03/16/22 1520    Specimen: Blood from Arm, Right Updated: 03/16/22 1553     C-Reactive Protein 2.00 mg/dL     Magnesium [[id number]]  (Normal) Collected: 03/16/22 1520    Specimen: Blood from Arm, Right Updated: 03/16/22 1553     Magnesium 2.2 mg/dL     CBC Auto Differential [[id number]]  (Abnormal) Collected: 03/16/22 1520    Specimen: Blood from Arm, Right Updated: 03/16/22 1527     WBC 12.90 10*3/mm3      RBC 4.36 10*6/mm3      Hemoglobin 14.1 g/dL      Hematocrit 41.8 %      MCV 95.9 fL      MCH 32.3 pg      MCHC 33.7 g/dL      RDW 14.0 %      RDW-SD 49.9 fl      MPV 9.1 fL      Platelets 377 10*3/mm3      Neutrophil % 71.9 %      Lymphocyte % 17.0 %      Monocyte % 7.8 %      Eosinophil % 1.2 %      Basophil % 1.2 %      Immature Grans % 0.9 %      Neutrophils, Absolute 9.29 10*3/mm3      Lymphocytes, Absolute 2.19 10*3/mm3      Monocytes, Absolute 1.01 10*3/mm3      Eosinophils, Absolute 0.15 10*3/mm3      Basophils, Absolute 0.15 10*3/mm3      Immature Grans, Absolute 0.11 10*3/mm3      nRBC 0.0 /100 WBC     COVID PRE-OP / PRE-PROCEDURE SCREENING ORDER (NO ISOLATION) - Swab, Nasopharynx [[id number]]  (Normal) Collected: 03/16/22 1521    Specimen:  Swab from Nasopharynx Updated: 03/16/22 1559    Narrative:      The following orders were created for panel order COVID PRE-OP / PRE-PROCEDURE SCREENING ORDER (NO ISOLATION) - Swab, Nasopharynx.  Procedure                               Abnormality         Status                     ---------                               -----------         ------                     COVID-19 and FLU A/B PCR...[[id number]]  Normal              Final result                 Please view results for these tests on the individual orders.    COVID-19 and FLU A/B PCR - Swab, Nasopharynx [[id number]]  (Normal) Collected: 03/16/22 1521    Specimen: Swab from Nasopharynx Updated: 03/16/22 1559     COVID19 Not Detected     Influenza A PCR Not Detected     Influenza B PCR Not Detected    Narrative:      Fact sheet for providers: https://www.fda.gov/media/481840/download    Fact sheet for patients: https://www.fda.gov/media/310318/download    Test performed by PCR.            XR Chest 1 View   Final Result   Appearance concerning for right-sided pneumonia       This report was finalized on 3/16/2022 3:59 PM by Dr. Bobo Gonzalez MD.                New Medications Ordered This Visit   Medications   • sodium chloride 0.9 % bolus 500 mL   • methylPREDNISolone sodium succinate (SOLU-Medrol) injection 125 mg   • ipratropium-albuterol (DUO-NEB) nebulizer solution 3 mL   • azithromycin 500 MG/250 ML 0.9% NS IVPB (MBP)   • acetylcysteine (MUCOMYST) 20 % nebulizer solution 3 mL   • doxycycline (MONODOX) capsule 100 mg   • doxycycline (MONODOX) 100 MG capsule     Sig: Take 1 capsule by mouth 2 (Two) Times a Day for 7 days.     Dispense:  14 capsule     Refill:  0         Procedures            MEDICAL DECISION-MAKING AND ED COURSE:     ED Course as of 03/16/22 1649   Wed Mar 16, 2022   1519 MDM: 36-year-old male presents with few days of progressively worsening shortness of breath, mild respiratory distress with tachypnea on arrival on his baseline home 4 L  nasal cannula.  Diffuse expiratory wheeze with expiratory prolongation noted.  Endorses chronic sputum production, mild possible increased recently.  We will treat for COPD exacerbation and methylprednisolone, DuoNeb, azithromycin. [KP]   1550 WBC(!): 12.90 [KP]   1550 Hemoglobin: 14.1 [KP]   1550 Platelets: 377  EKG at 1510 hrs. NSR, sinus arrhythmia, 76 bpm, , QRS is 76, QTc 423, left axis deviation, no STEMI. [KP]   1554 Creatinine(!): 0.63 [KP]   1554 Lactate: 1.1 [KP]   1554 C-Reactive Protein(!): 2.00 [KP]   1554 Magnesium: 2.2 [KP]   1618 COVID19: Not Detected [KP]   1619 XR Chest 1 View  IMPRESSION:  Appearance concerning for right-sided pneumonia [KP]   1619 CXR concerning for right-sided pneumonia.  Given patient does have elevated white count will treat, discontinue azithromycin, start doxycycline. [KP]   1648 Patient's breathing significantly proved, wheeze drastically reduced.  Discussed findings with patient, recommendations, return precautions.  Patient is agreeable to plan with discharge at this time. [KP]      ED Course User Index  [KP] See Parker MD       ?      FINAL IMPRESSION:       1. COPD exacerbation (HCC)         The complaints listed here are new problems to this examiner.      FOLLOW-UP  Valarie Garcia, APRN  [address]  [facility] [address]  [phone]    Schedule an appointment as soon as possible for a visit         DISPOSITION  ED Disposition     ED Disposition   Discharge    Condition   Stable    Comment   --                   This care is provided during an unprecedented national emergency due to the Novel Coronavirus (COVID-19). COVID-19 infections and transmission risks place heavy strains on healthcare resources. As this pandemic evolves, the Hospital and providers strive to respond fluidly, to remain operational, and to provide care relative to available resources and information. Outcomes are unpredictable and treatments are without well-defined  guidelines. Further, the impact of COVID-19 on all aspects of emergency care, including the impact to patients seeking care for reasons other than COVID-19, is unavoidable during this national emergency.    This note was dictated using a [id number] tool. Occasional wrong-word or 'sound-a-like' substitutions may have occurred due to the inherent limitations of voice recognition software. ?Read the chart carefully and recognize, using context, where substitutions have occurred.    See Parker MD  16:49 EDT  3/16/2022             See Parker MD  03/16/22 1648       See Parker MD  03/16/22 4919

## 2022-03-16 NOTE — DISCHARGE INSTRUCTIONS
Chest x-ray showed possibility of pneumonia.  For this reason we will treat you with antibiotics just to be safe.  Please take doxycycline as prescribed.    You are also diagnosed with a COPD exacerbation.  You are prescribed with a steroid to reduce inflammation.  Please take prednisone for the next 5 days as prescribed.    Additionally for your COPD exacerbation recommend that you take your home nebulizer treatment every 4 hours for the next 24 hours.  For the following 24 hours take it every 6 hours.  The 24 hours after that take it at least every 8 hours.  Do not wait until you are feeling more short of breath.    Follow-up with your primary care provider at the next available appointment.

## 2022-03-16 NOTE — ED NOTES
Spoke with Dr. Parker prior to administration of antibiotics to question need for blood cultures. Dr. Parker declined to order blood cultures and stated to go ahead with antibiotics as he was treating COPD not bacterial infection.

## 2022-03-16 NOTE — ED NOTES
"Spoke with patient's mother Barbie for ride home per discharge order. Mother upset that patient is being discharged home stating \"He is too sick to come home\" but is in agreement to come  patient.   "

## 2022-07-24 ENCOUNTER — APPOINTMENT (OUTPATIENT)
Dept: GENERAL RADIOLOGY | Facility: HOSPITAL | Age: 57
End: 2022-07-24

## 2022-07-24 ENCOUNTER — APPOINTMENT (OUTPATIENT)
Dept: CT IMAGING | Facility: HOSPITAL | Age: 57
End: 2022-07-24

## 2022-07-24 ENCOUNTER — HOSPITAL ENCOUNTER (INPATIENT)
Facility: HOSPITAL | Age: 57
LOS: 17 days | Discharge: HOME HEALTH | End: 2022-08-11
Attending: STUDENT IN AN ORGANIZED HEALTH CARE EDUCATION/TRAINING PROGRAM | Admitting: STUDENT IN AN ORGANIZED HEALTH CARE EDUCATION/TRAINING PROGRAM

## 2022-07-24 DIAGNOSIS — U07.1 COVID-19: ICD-10-CM

## 2022-07-24 DIAGNOSIS — J96.21 ACUTE ON CHRONIC RESPIRATORY FAILURE WITH HYPOXIA: ICD-10-CM

## 2022-07-24 DIAGNOSIS — J96.02 ACUTE RESPIRATORY FAILURE WITH HYPOXIA AND HYPERCAPNIA: Primary | ICD-10-CM

## 2022-07-24 DIAGNOSIS — J96.01 ACUTE RESPIRATORY FAILURE WITH HYPOXIA AND HYPERCAPNIA: Primary | ICD-10-CM

## 2022-07-24 DIAGNOSIS — I63.9 CEREBROVASCULAR ACCIDENT (CVA), UNSPECIFIED MECHANISM: ICD-10-CM

## 2022-07-24 LAB
A-A DO2: 72 MMHG (ref 0–300)
ALBUMIN SERPL-MCNC: 4.58 G/DL (ref 3.5–5.2)
ALBUMIN/GLOB SERPL: 1.7 G/DL
ALP SERPL-CCNC: 53 U/L (ref 39–117)
ALT SERPL W P-5'-P-CCNC: 19 U/L (ref 1–41)
AMMONIA BLD-SCNC: 59 UMOL/L (ref 16–60)
AMPHET+METHAMPHET UR QL: NEGATIVE
AMPHETAMINES UR QL: NEGATIVE
ANION GAP SERPL CALCULATED.3IONS-SCNC: 6.9 MMOL/L (ref 5–15)
APAP SERPL-MCNC: <5 MCG/ML (ref 0–30)
ARTERIAL PATENCY WRIST A: ABNORMAL
AST SERPL-CCNC: 20 U/L (ref 1–40)
ATMOSPHERIC PRESS: 727 MMHG
BACTERIA UR QL AUTO: ABNORMAL /HPF
BARBITURATES UR QL SCN: NEGATIVE
BASE EXCESS BLDA CALC-SCNC: 9 MMOL/L (ref 0–2)
BASOPHILS # BLD AUTO: 0.02 10*3/MM3 (ref 0–0.2)
BASOPHILS NFR BLD AUTO: 0.2 % (ref 0–1.5)
BDY SITE: ABNORMAL
BENZODIAZ UR QL SCN: NEGATIVE
BILIRUB SERPL-MCNC: 0.3 MG/DL (ref 0–1.2)
BILIRUB UR QL STRIP: NEGATIVE
BODY TEMPERATURE: 0 C
BUN SERPL-MCNC: 22 MG/DL (ref 6–20)
BUN/CREAT SERPL: 35.5 (ref 7–25)
BUPRENORPHINE SERPL-MCNC: NEGATIVE NG/ML
CALCIUM SPEC-SCNC: 9.8 MG/DL (ref 8.6–10.5)
CANNABINOIDS SERPL QL: NEGATIVE
CHLORIDE SERPL-SCNC: 82 MMOL/L (ref 98–107)
CLARITY UR: CLEAR
CO2 BLDA-SCNC: 42.7 MMOL/L (ref 22–33)
CO2 SERPL-SCNC: 37.1 MMOL/L (ref 22–29)
COCAINE UR QL: NEGATIVE
COHGB MFR BLD: 2.5 % (ref 0–5)
COLOR UR: YELLOW
CREAT SERPL-MCNC: 0.62 MG/DL (ref 0.76–1.27)
D-LACTATE SERPL-SCNC: 0.7 MMOL/L (ref 0.5–2)
DEPRECATED RDW RBC AUTO: 43.2 FL (ref 37–54)
EGFRCR SERPLBLD CKD-EPI 2021: 112.9 ML/MIN/1.73
EOSINOPHIL # BLD AUTO: 0.01 10*3/MM3 (ref 0–0.4)
EOSINOPHIL NFR BLD AUTO: 0.1 % (ref 0.3–6.2)
ERYTHROCYTE [DISTWIDTH] IN BLOOD BY AUTOMATED COUNT: 11.9 % (ref 12.3–15.4)
ETHANOL BLD-MCNC: <10 MG/DL (ref 0–10)
ETHANOL UR QL: <0.01 %
FLUAV RNA RESP QL NAA+PROBE: NOT DETECTED
FLUBV RNA RESP QL NAA+PROBE: NOT DETECTED
GAS FLOW AIRWAY: 3 LPM
GLOBULIN UR ELPH-MCNC: 2.7 GM/DL
GLUCOSE BLDC GLUCOMTR-MCNC: 108 MG/DL (ref 70–130)
GLUCOSE SERPL-MCNC: 115 MG/DL (ref 65–99)
GLUCOSE UR STRIP-MCNC: NEGATIVE MG/DL
HCO3 BLDA-SCNC: 40.1 MMOL/L (ref 20–26)
HCT VFR BLD AUTO: 50.1 % (ref 37.5–51)
HCT VFR BLD CALC: 49.5 % (ref 38–51)
HGB BLD-MCNC: 16.1 G/DL (ref 13–17.7)
HGB BLDA-MCNC: 16.1 G/DL (ref 14–18)
HGB UR QL STRIP.AUTO: NEGATIVE
HOLD SPECIMEN: NORMAL
HOLD SPECIMEN: NORMAL
HYALINE CASTS UR QL AUTO: ABNORMAL /LPF
IMM GRANULOCYTES # BLD AUTO: 0.07 10*3/MM3 (ref 0–0.05)
IMM GRANULOCYTES NFR BLD AUTO: 0.7 % (ref 0–0.5)
INHALED O2 CONCENTRATION: 32 %
KETONES UR QL STRIP: ABNORMAL
LEUKOCYTE ESTERASE UR QL STRIP.AUTO: NEGATIVE
LYMPHOCYTES # BLD AUTO: 1.09 10*3/MM3 (ref 0.7–3.1)
LYMPHOCYTES NFR BLD AUTO: 10.4 % (ref 19.6–45.3)
Lab: ABNORMAL
Lab: ABNORMAL
MCH RBC QN AUTO: 31.1 PG (ref 26.6–33)
MCHC RBC AUTO-ENTMCNC: 32.1 G/DL (ref 31.5–35.7)
MCV RBC AUTO: 96.9 FL (ref 79–97)
METHADONE UR QL SCN: NEGATIVE
METHGB BLD QL: 0.1 % (ref 0–3)
MODALITY: ABNORMAL
MONOCYTES # BLD AUTO: 1.26 10*3/MM3 (ref 0.1–0.9)
MONOCYTES NFR BLD AUTO: 12.1 % (ref 5–12)
NEUTROPHILS NFR BLD AUTO: 7.99 10*3/MM3 (ref 1.7–7)
NEUTROPHILS NFR BLD AUTO: 76.5 % (ref 42.7–76)
NITRITE UR QL STRIP: NEGATIVE
NOTE: ABNORMAL
NOTIFIED BY: ABNORMAL
NOTIFIED WHO: ABNORMAL
NRBC BLD AUTO-RTO: 0 /100 WBC (ref 0–0.2)
OPIATES UR QL: NEGATIVE
OXYCODONE UR QL SCN: NEGATIVE
OXYHGB MFR BLDV: 84.8 % (ref 94–99)
PCO2 BLDA: 84.9 MM HG (ref 35–45)
PCO2 TEMP ADJ BLD: ABNORMAL MM[HG]
PCP UR QL SCN: NEGATIVE
PH BLDA: 7.28 PH UNITS (ref 7.35–7.45)
PH UR STRIP.AUTO: 6 [PH] (ref 5–8)
PH, TEMP CORRECTED: ABNORMAL
PLATELET # BLD AUTO: 252 10*3/MM3 (ref 140–450)
PMV BLD AUTO: 10.1 FL (ref 6–12)
PO2 BLDA: 51.3 MM HG (ref 83–108)
PO2 TEMP ADJ BLD: ABNORMAL MM[HG]
POTASSIUM SERPL-SCNC: 5.2 MMOL/L (ref 3.5–5.2)
PROPOXYPH UR QL: NEGATIVE
PROT SERPL-MCNC: 7.3 G/DL (ref 6–8.5)
PROT UR QL STRIP: ABNORMAL
RBC # BLD AUTO: 5.17 10*6/MM3 (ref 4.14–5.8)
RBC # UR STRIP: ABNORMAL /HPF
REF LAB TEST METHOD: ABNORMAL
SALICYLATES SERPL-MCNC: <0.3 MG/DL
SAO2 % BLDCOA: 87.1 % (ref 94–99)
SARS-COV-2 RNA RESP QL NAA+PROBE: DETECTED
SODIUM SERPL-SCNC: 126 MMOL/L (ref 136–145)
SP GR UR STRIP: 1.01 (ref 1–1.03)
SQUAMOUS #/AREA URNS HPF: ABNORMAL /HPF
TRICYCLICS UR QL SCN: NEGATIVE
UROBILINOGEN UR QL STRIP: ABNORMAL
VENTILATOR MODE: ABNORMAL
WBC # UR STRIP: ABNORMAL /HPF
WBC NRBC COR # BLD: 10.44 10*3/MM3 (ref 3.4–10.8)
WHOLE BLOOD HOLD COAG: NORMAL
WHOLE BLOOD HOLD SPECIMEN: NORMAL

## 2022-07-24 PROCEDURE — 82805 BLOOD GASES W/O2 SATURATION: CPT

## 2022-07-24 PROCEDURE — 87636 SARSCOV2 & INF A&B AMP PRB: CPT | Performed by: STUDENT IN AN ORGANIZED HEALTH CARE EDUCATION/TRAINING PROGRAM

## 2022-07-24 PROCEDURE — 93005 ELECTROCARDIOGRAM TRACING: CPT | Performed by: STUDENT IN AN ORGANIZED HEALTH CARE EDUCATION/TRAINING PROGRAM

## 2022-07-24 PROCEDURE — 94660 CPAP INITIATION&MGMT: CPT

## 2022-07-24 PROCEDURE — 83605 ASSAY OF LACTIC ACID: CPT | Performed by: STUDENT IN AN ORGANIZED HEALTH CARE EDUCATION/TRAINING PROGRAM

## 2022-07-24 PROCEDURE — 82140 ASSAY OF AMMONIA: CPT | Performed by: NURSE PRACTITIONER

## 2022-07-24 PROCEDURE — 70498 CT ANGIOGRAPHY NECK: CPT

## 2022-07-24 PROCEDURE — 70450 CT HEAD/BRAIN W/O DYE: CPT

## 2022-07-24 PROCEDURE — 94799 UNLISTED PULMONARY SVC/PX: CPT

## 2022-07-24 PROCEDURE — 36600 WITHDRAWAL OF ARTERIAL BLOOD: CPT

## 2022-07-24 PROCEDURE — 85025 COMPLETE CBC W/AUTO DIFF WBC: CPT | Performed by: STUDENT IN AN ORGANIZED HEALTH CARE EDUCATION/TRAINING PROGRAM

## 2022-07-24 PROCEDURE — 83050 HGB METHEMOGLOBIN QUAN: CPT

## 2022-07-24 PROCEDURE — 99291 CRITICAL CARE FIRST HOUR: CPT

## 2022-07-24 PROCEDURE — 93010 ELECTROCARDIOGRAM REPORT: CPT | Performed by: INTERNAL MEDICINE

## 2022-07-24 PROCEDURE — 51702 INSERT TEMP BLADDER CATH: CPT

## 2022-07-24 PROCEDURE — 71260 CT THORAX DX C+: CPT

## 2022-07-24 PROCEDURE — 80061 LIPID PANEL: CPT | Performed by: NURSE PRACTITIONER

## 2022-07-24 PROCEDURE — 36415 COLL VENOUS BLD VENIPUNCTURE: CPT

## 2022-07-24 PROCEDURE — 83036 HEMOGLOBIN GLYCOSYLATED A1C: CPT | Performed by: NURSE PRACTITIONER

## 2022-07-24 PROCEDURE — 82962 GLUCOSE BLOOD TEST: CPT

## 2022-07-24 PROCEDURE — 70496 CT ANGIOGRAPHY HEAD: CPT

## 2022-07-24 PROCEDURE — 82375 ASSAY CARBOXYHB QUANT: CPT

## 2022-07-24 PROCEDURE — 82746 ASSAY OF FOLIC ACID SERUM: CPT | Performed by: NURSE PRACTITIONER

## 2022-07-24 PROCEDURE — 99223 1ST HOSP IP/OBS HIGH 75: CPT | Performed by: PSYCHIATRY & NEUROLOGY

## 2022-07-24 PROCEDURE — 87040 BLOOD CULTURE FOR BACTERIA: CPT | Performed by: STUDENT IN AN ORGANIZED HEALTH CARE EDUCATION/TRAINING PROGRAM

## 2022-07-24 PROCEDURE — 0 IOPAMIDOL PER 1 ML: Performed by: STUDENT IN AN ORGANIZED HEALTH CARE EDUCATION/TRAINING PROGRAM

## 2022-07-24 PROCEDURE — 81001 URINALYSIS AUTO W/SCOPE: CPT | Performed by: NURSE PRACTITIONER

## 2022-07-24 PROCEDURE — 80179 DRUG ASSAY SALICYLATE: CPT | Performed by: NURSE PRACTITIONER

## 2022-07-24 PROCEDURE — 80306 DRUG TEST PRSMV INSTRMNT: CPT | Performed by: NURSE PRACTITIONER

## 2022-07-24 PROCEDURE — 80053 COMPREHEN METABOLIC PANEL: CPT | Performed by: STUDENT IN AN ORGANIZED HEALTH CARE EDUCATION/TRAINING PROGRAM

## 2022-07-24 PROCEDURE — 82077 ASSAY SPEC XCP UR&BREATH IA: CPT | Performed by: NURSE PRACTITIONER

## 2022-07-24 PROCEDURE — 71045 X-RAY EXAM CHEST 1 VIEW: CPT

## 2022-07-24 PROCEDURE — 80143 DRUG ASSAY ACETAMINOPHEN: CPT | Performed by: NURSE PRACTITIONER

## 2022-07-24 PROCEDURE — 82607 VITAMIN B-12: CPT | Performed by: NURSE PRACTITIONER

## 2022-07-24 PROCEDURE — 83880 ASSAY OF NATRIURETIC PEPTIDE: CPT | Performed by: NURSE PRACTITIONER

## 2022-07-24 RX ORDER — SODIUM CHLORIDE 0.9 % (FLUSH) 0.9 %
10 SYRINGE (ML) INJECTION AS NEEDED
Status: DISCONTINUED | OUTPATIENT
Start: 2022-07-24 | End: 2022-08-11 | Stop reason: HOSPADM

## 2022-07-24 RX ADMIN — SODIUM CHLORIDE 1000 ML: 9 INJECTION, SOLUTION INTRAVENOUS at 22:52

## 2022-07-24 RX ADMIN — IOPAMIDOL 89 ML: 755 INJECTION, SOLUTION INTRAVENOUS at 23:47

## 2022-07-25 PROBLEM — J96.02 ACUTE RESPIRATORY FAILURE WITH HYPOXIA AND HYPERCAPNIA (HCC): Status: ACTIVE | Noted: 2022-07-25

## 2022-07-25 PROBLEM — J96.01 ACUTE RESPIRATORY FAILURE WITH HYPOXIA AND HYPERCAPNIA (HCC): Status: ACTIVE | Noted: 2022-07-25

## 2022-07-25 LAB
A-A DO2: 106.8 MMHG (ref 0–300)
A-A DO2: 110.1 MMHG (ref 0–300)
A-A DO2: 118.6 MMHG (ref 0–300)
A-A DO2: 122.5 MMHG (ref 0–300)
A-A DO2: 126.5 MMHG (ref 0–300)
ALBUMIN SERPL-MCNC: 4.13 G/DL (ref 3.5–5.2)
ALP SERPL-CCNC: 47 U/L (ref 39–117)
ALT SERPL W P-5'-P-CCNC: 18 U/L (ref 1–41)
ARTERIAL PATENCY WRIST A: ABNORMAL
ARTERIAL PATENCY WRIST A: POSITIVE
ARTERIAL PATENCY WRIST A: POSITIVE
AST SERPL-CCNC: 19 U/L (ref 1–40)
ATMOSPHERIC PRESS: 726 MMHG
ATMOSPHERIC PRESS: 727 MMHG
ATMOSPHERIC PRESS: 727 MMHG
BASE EXCESS BLDA CALC-SCNC: 10.3 MMOL/L (ref 0–2)
BASE EXCESS BLDA CALC-SCNC: 6.6 MMOL/L (ref 0–2)
BASE EXCESS BLDA CALC-SCNC: 7.2 MMOL/L (ref 0–2)
BASE EXCESS BLDA CALC-SCNC: 8.1 MMOL/L (ref 0–2)
BASE EXCESS BLDA CALC-SCNC: 9.2 MMOL/L (ref 0–2)
BDY SITE: ABNORMAL
BILIRUB CONJ SERPL-MCNC: <0.2 MG/DL (ref 0–0.3)
BILIRUB INDIRECT SERPL-MCNC: NORMAL MG/DL
BILIRUB SERPL-MCNC: 0.2 MG/DL (ref 0–1.2)
BODY TEMPERATURE: 0 C
CHOLEST SERPL-MCNC: 219 MG/DL (ref 0–200)
CO2 BLDA-SCNC: 40 MMOL/L (ref 22–33)
CO2 BLDA-SCNC: 40.5 MMOL/L (ref 22–33)
CO2 BLDA-SCNC: 40.6 MMOL/L (ref 22–33)
CO2 BLDA-SCNC: 40.8 MMOL/L (ref 22–33)
CO2 BLDA-SCNC: 42 MMOL/L (ref 22–33)
COHGB MFR BLD: 1.1 % (ref 0–5)
COHGB MFR BLD: 1.7 % (ref 0–5)
COHGB MFR BLD: 2 % (ref 0–5)
COHGB MFR BLD: 2.1 % (ref 0–5)
COHGB MFR BLD: 2.3 % (ref 0–5)
CREAT SERPL-MCNC: 0.63 MG/DL (ref 0.76–1.27)
D DIMER PPP FEU-MCNC: 1.21 MCGFEU/ML (ref 0–0.5)
EGFRCR SERPLBLD CKD-EPI 2021: 112.3 ML/MIN/1.73
EPAP: 8
FERRITIN SERPL-MCNC: 650.4 NG/ML (ref 30–400)
FOLATE SERPL-MCNC: 15.8 NG/ML (ref 4.78–24.2)
GLUCOSE BLDC GLUCOMTR-MCNC: 83 MG/DL (ref 70–130)
GLUCOSE BLDC GLUCOMTR-MCNC: 84 MG/DL (ref 70–130)
GLUCOSE BLDC GLUCOMTR-MCNC: 89 MG/DL (ref 70–130)
HBA1C MFR BLD: 5.8 % (ref 4.8–5.6)
HCO3 BLDA-SCNC: 37.6 MMOL/L (ref 20–26)
HCO3 BLDA-SCNC: 38 MMOL/L (ref 20–26)
HCO3 BLDA-SCNC: 38.2 MMOL/L (ref 20–26)
HCO3 BLDA-SCNC: 38.3 MMOL/L (ref 20–26)
HCO3 BLDA-SCNC: 39.7 MMOL/L (ref 20–26)
HCT VFR BLD CALC: 44.1 % (ref 38–51)
HCT VFR BLD CALC: 45.7 % (ref 38–51)
HCT VFR BLD CALC: 45.8 % (ref 38–51)
HCT VFR BLD CALC: 46.1 % (ref 38–51)
HCT VFR BLD CALC: 47.2 % (ref 38–51)
HDLC SERPL-MCNC: 37 MG/DL (ref 40–60)
HGB BLDA-MCNC: 14.4 G/DL (ref 14–18)
HGB BLDA-MCNC: 14.9 G/DL (ref 14–18)
HGB BLDA-MCNC: 14.9 G/DL (ref 14–18)
HGB BLDA-MCNC: 15 G/DL (ref 14–18)
HGB BLDA-MCNC: 15.4 G/DL (ref 14–18)
INHALED O2 CONCENTRATION: 40 %
IPAP: 20
IPAP: 22
IPAP: 22
IPAP: 24
IPAP: 24
LDH SERPL-CCNC: 193 U/L (ref 135–225)
LDLC SERPL CALC-MCNC: 150 MG/DL (ref 0–100)
LDLC/HDLC SERPL: 3.96 {RATIO}
Lab: ABNORMAL
METHGB BLD QL: 0.3 % (ref 0–3)
METHGB BLD QL: 0.3 % (ref 0–3)
METHGB BLD QL: 0.4 % (ref 0–3)
METHGB BLD QL: 0.4 % (ref 0–3)
METHGB BLD QL: 0.5 % (ref 0–3)
MODALITY: ABNORMAL
NOTE: ABNORMAL
NOTIFIED BY: ABNORMAL
NOTIFIED WHO: ABNORMAL
NT-PROBNP SERPL-MCNC: 710.7 PG/ML (ref 0–900)
OXYHGB MFR BLDV: 88.9 % (ref 94–99)
OXYHGB MFR BLDV: 89.7 % (ref 94–99)
OXYHGB MFR BLDV: 91.5 % (ref 94–99)
OXYHGB MFR BLDV: 92 % (ref 94–99)
OXYHGB MFR BLDV: 94.7 % (ref 94–99)
PCO2 BLDA: 72.1 MM HG (ref 35–45)
PCO2 BLDA: 75.4 MM HG (ref 35–45)
PCO2 BLDA: 77.9 MM HG (ref 35–45)
PCO2 BLDA: 80.8 MM HG (ref 35–45)
PCO2 BLDA: 90.6 MM HG (ref 35–45)
PCO2 TEMP ADJ BLD: ABNORMAL MM[HG]
PH BLDA: 7.23 PH UNITS (ref 7.35–7.45)
PH BLDA: 7.28 PH UNITS (ref 7.35–7.45)
PH BLDA: 7.3 PH UNITS (ref 7.35–7.45)
PH BLDA: 7.33 PH UNITS (ref 7.35–7.45)
PH BLDA: 7.33 PH UNITS (ref 7.35–7.45)
PH, TEMP CORRECTED: ABNORMAL
PO2 BLDA: 60.4 MM HG (ref 83–108)
PO2 BLDA: 65.5 MM HG (ref 83–108)
PO2 BLDA: 66.2 MM HG (ref 83–108)
PO2 BLDA: 67.3 MM HG (ref 83–108)
PO2 BLDA: 79 MM HG (ref 83–108)
PO2 TEMP ADJ BLD: ABNORMAL MM[HG]
PROCALCITONIN SERPL-MCNC: 0.06 NG/ML (ref 0–0.25)
PROT SERPL-MCNC: 6.8 G/DL (ref 6–8.5)
SAO2 % BLDCOA: 91.3 % (ref 94–99)
SAO2 % BLDCOA: 92.2 % (ref 94–99)
SAO2 % BLDCOA: 93.8 % (ref 94–99)
SAO2 % BLDCOA: 93.9 % (ref 94–99)
SAO2 % BLDCOA: 96 % (ref 94–99)
SET MECH RESP RATE: 22
SET MECH RESP RATE: 28
TRIGL SERPL-MCNC: 178 MG/DL (ref 0–150)
TSH SERPL DL<=0.05 MIU/L-ACNC: 0.15 UIU/ML (ref 0.27–4.2)
VENTILATOR MODE: ABNORMAL
VIT B12 BLD-MCNC: 933 PG/ML (ref 211–946)
VLDLC SERPL-MCNC: 32 MG/DL (ref 5–40)

## 2022-07-25 PROCEDURE — 36600 WITHDRAWAL OF ARTERIAL BLOOD: CPT

## 2022-07-25 PROCEDURE — 82375 ASSAY CARBOXYHB QUANT: CPT

## 2022-07-25 PROCEDURE — 93010 ELECTROCARDIOGRAM REPORT: CPT | Performed by: INTERNAL MEDICINE

## 2022-07-25 PROCEDURE — 94761 N-INVAS EAR/PLS OXIMETRY MLT: CPT

## 2022-07-25 PROCEDURE — 82728 ASSAY OF FERRITIN: CPT | Performed by: STUDENT IN AN ORGANIZED HEALTH CARE EDUCATION/TRAINING PROGRAM

## 2022-07-25 PROCEDURE — 85379 FIBRIN DEGRADATION QUANT: CPT | Performed by: STUDENT IN AN ORGANIZED HEALTH CARE EDUCATION/TRAINING PROGRAM

## 2022-07-25 PROCEDURE — 25010000002 METHYLPREDNISOLONE PER 125 MG: Performed by: STUDENT IN AN ORGANIZED HEALTH CARE EDUCATION/TRAINING PROGRAM

## 2022-07-25 PROCEDURE — 80076 HEPATIC FUNCTION PANEL: CPT | Performed by: EMERGENCY MEDICINE

## 2022-07-25 PROCEDURE — 94799 UNLISTED PULMONARY SVC/PX: CPT

## 2022-07-25 PROCEDURE — 83050 HGB METHEMOGLOBIN QUAN: CPT

## 2022-07-25 PROCEDURE — 94640 AIRWAY INHALATION TREATMENT: CPT

## 2022-07-25 PROCEDURE — XW033E5 INTRODUCTION OF REMDESIVIR ANTI-INFECTIVE INTO PERIPHERAL VEIN, PERCUTANEOUS APPROACH, NEW TECHNOLOGY GROUP 5: ICD-10-PCS | Performed by: INTERNAL MEDICINE

## 2022-07-25 PROCEDURE — 25010000002 CEFTRIAXONE PER 250 MG: Performed by: EMERGENCY MEDICINE

## 2022-07-25 PROCEDURE — 84443 ASSAY THYROID STIM HORMONE: CPT | Performed by: STUDENT IN AN ORGANIZED HEALTH CARE EDUCATION/TRAINING PROGRAM

## 2022-07-25 PROCEDURE — 94660 CPAP INITIATION&MGMT: CPT

## 2022-07-25 PROCEDURE — 25010000002 DEXAMETHASONE SODIUM PHOSPHATE 10 MG/ML SOLUTION: Performed by: NURSE PRACTITIONER

## 2022-07-25 PROCEDURE — 83615 LACTATE (LD) (LDH) ENZYME: CPT | Performed by: STUDENT IN AN ORGANIZED HEALTH CARE EDUCATION/TRAINING PROGRAM

## 2022-07-25 PROCEDURE — 82805 BLOOD GASES W/O2 SATURATION: CPT

## 2022-07-25 PROCEDURE — 99232 SBSQ HOSP IP/OBS MODERATE 35: CPT | Performed by: NURSE PRACTITIONER

## 2022-07-25 PROCEDURE — 25010000002 ENOXAPARIN PER 10 MG: Performed by: STUDENT IN AN ORGANIZED HEALTH CARE EDUCATION/TRAINING PROGRAM

## 2022-07-25 PROCEDURE — 25010000002 REMDESIVIR 100 MG RECONSTITUTED SOLUTION: Performed by: EMERGENCY MEDICINE

## 2022-07-25 PROCEDURE — 93005 ELECTROCARDIOGRAM TRACING: CPT | Performed by: STUDENT IN AN ORGANIZED HEALTH CARE EDUCATION/TRAINING PROGRAM

## 2022-07-25 PROCEDURE — 82962 GLUCOSE BLOOD TEST: CPT

## 2022-07-25 PROCEDURE — 84145 PROCALCITONIN (PCT): CPT | Performed by: STUDENT IN AN ORGANIZED HEALTH CARE EDUCATION/TRAINING PROGRAM

## 2022-07-25 PROCEDURE — 25010000002 LORAZEPAM PER 2 MG: Performed by: STUDENT IN AN ORGANIZED HEALTH CARE EDUCATION/TRAINING PROGRAM

## 2022-07-25 PROCEDURE — 99222 1ST HOSP IP/OBS MODERATE 55: CPT

## 2022-07-25 PROCEDURE — 82565 ASSAY OF CREATININE: CPT | Performed by: EMERGENCY MEDICINE

## 2022-07-25 RX ORDER — ATORVASTATIN CALCIUM 40 MG/1
80 TABLET, FILM COATED ORAL NIGHTLY
Status: DISCONTINUED | OUTPATIENT
Start: 2022-07-25 | End: 2022-08-11 | Stop reason: HOSPADM

## 2022-07-25 RX ORDER — LORATADINE 10 MG/1
10 TABLET ORAL DAILY
COMMUNITY

## 2022-07-25 RX ORDER — PROMETHAZINE HYDROCHLORIDE 12.5 MG/1
12.5 TABLET ORAL 2 TIMES DAILY PRN
COMMUNITY

## 2022-07-25 RX ORDER — BUSPIRONE HYDROCHLORIDE 5 MG/1
5 TABLET ORAL 2 TIMES DAILY PRN
COMMUNITY

## 2022-07-25 RX ORDER — LORAZEPAM 2 MG/ML
2 INJECTION INTRAMUSCULAR
Status: DISCONTINUED | OUTPATIENT
Start: 2022-07-25 | End: 2022-07-26

## 2022-07-25 RX ORDER — NITROGLYCERIN 0.4 MG/1
0.4 TABLET SUBLINGUAL
Status: DISCONTINUED | OUTPATIENT
Start: 2022-07-25 | End: 2022-08-11 | Stop reason: HOSPADM

## 2022-07-25 RX ORDER — AMOXICILLIN 250 MG
2 CAPSULE ORAL 2 TIMES DAILY
Status: DISCONTINUED | OUTPATIENT
Start: 2022-07-25 | End: 2022-08-11 | Stop reason: HOSPADM

## 2022-07-25 RX ORDER — CETIRIZINE HYDROCHLORIDE 10 MG/1
10 TABLET ORAL DAILY
Status: DISCONTINUED | OUTPATIENT
Start: 2022-07-25 | End: 2022-08-11 | Stop reason: HOSPADM

## 2022-07-25 RX ORDER — IPRATROPIUM BROMIDE AND ALBUTEROL SULFATE 2.5; .5 MG/3ML; MG/3ML
3 SOLUTION RESPIRATORY (INHALATION) ONCE
Status: COMPLETED | OUTPATIENT
Start: 2022-07-25 | End: 2022-07-25

## 2022-07-25 RX ORDER — ASPIRIN 81 MG/1
81 TABLET, CHEWABLE ORAL DAILY
Status: DISCONTINUED | OUTPATIENT
Start: 2022-07-26 | End: 2022-08-11 | Stop reason: HOSPADM

## 2022-07-25 RX ORDER — SODIUM CHLORIDE 0.9 % (FLUSH) 0.9 %
10 SYRINGE (ML) INJECTION AS NEEDED
Status: DISCONTINUED | OUTPATIENT
Start: 2022-07-25 | End: 2022-08-11 | Stop reason: HOSPADM

## 2022-07-25 RX ORDER — BISACODYL 10 MG
10 SUPPOSITORY, RECTAL RECTAL DAILY PRN
Status: DISCONTINUED | OUTPATIENT
Start: 2022-07-25 | End: 2022-08-11 | Stop reason: HOSPADM

## 2022-07-25 RX ORDER — FLUTICASONE PROPIONATE 50 MCG
1 SPRAY, SUSPENSION (ML) NASAL DAILY
Status: DISCONTINUED | OUTPATIENT
Start: 2022-07-25 | End: 2022-08-11 | Stop reason: HOSPADM

## 2022-07-25 RX ORDER — SODIUM CHLORIDE 0.9 % (FLUSH) 0.9 %
10 SYRINGE (ML) INJECTION EVERY 12 HOURS SCHEDULED
Status: DISCONTINUED | OUTPATIENT
Start: 2022-07-25 | End: 2022-08-11 | Stop reason: HOSPADM

## 2022-07-25 RX ORDER — NICOTINE 21 MG/24HR
1 PATCH, TRANSDERMAL 24 HOURS TRANSDERMAL EVERY 24 HOURS
Status: DISCONTINUED | OUTPATIENT
Start: 2022-07-25 | End: 2022-08-11 | Stop reason: HOSPADM

## 2022-07-25 RX ORDER — BISACODYL 5 MG/1
5 TABLET, DELAYED RELEASE ORAL DAILY PRN
Status: DISCONTINUED | OUTPATIENT
Start: 2022-07-25 | End: 2022-08-11 | Stop reason: HOSPADM

## 2022-07-25 RX ORDER — ONDANSETRON 4 MG/1
4 TABLET, ORALLY DISINTEGRATING ORAL EVERY 6 HOURS PRN
COMMUNITY

## 2022-07-25 RX ORDER — PANTOPRAZOLE SODIUM 40 MG/1
40 TABLET, DELAYED RELEASE ORAL DAILY
Status: DISCONTINUED | OUTPATIENT
Start: 2022-07-25 | End: 2022-07-26

## 2022-07-25 RX ORDER — SODIUM CHLORIDE 9 MG/ML
100 INJECTION, SOLUTION INTRAVENOUS CONTINUOUS
Status: DISCONTINUED | OUTPATIENT
Start: 2022-07-25 | End: 2022-07-26

## 2022-07-25 RX ORDER — GUAIFENESIN 600 MG/1
1200 TABLET, EXTENDED RELEASE ORAL 2 TIMES DAILY
Status: DISCONTINUED | OUTPATIENT
Start: 2022-07-25 | End: 2022-08-11 | Stop reason: HOSPADM

## 2022-07-25 RX ORDER — ALBUTEROL SULFATE 1.25 MG/3ML
1 SOLUTION RESPIRATORY (INHALATION) 2 TIMES DAILY PRN
Status: CANCELLED | OUTPATIENT
Start: 2022-07-25

## 2022-07-25 RX ORDER — PROMETHAZINE HYDROCHLORIDE 25 MG/1
12.5 TABLET ORAL 2 TIMES DAILY PRN
Status: CANCELLED | OUTPATIENT
Start: 2022-07-25

## 2022-07-25 RX ORDER — ASPIRIN 300 MG/1
300 SUPPOSITORY RECTAL ONCE
Status: COMPLETED | OUTPATIENT
Start: 2022-07-25 | End: 2022-07-25

## 2022-07-25 RX ORDER — DEXAMETHASONE SODIUM PHOSPHATE 10 MG/ML
10 INJECTION, SOLUTION INTRAMUSCULAR; INTRAVENOUS ONCE
Status: COMPLETED | OUTPATIENT
Start: 2022-07-25 | End: 2022-07-25

## 2022-07-25 RX ORDER — LORAZEPAM 1 MG/1
1 TABLET ORAL
Status: DISCONTINUED | OUTPATIENT
Start: 2022-07-25 | End: 2022-07-26

## 2022-07-25 RX ORDER — ACETAMINOPHEN 650 MG/1
650 SUPPOSITORY RECTAL ONCE
Status: COMPLETED | OUTPATIENT
Start: 2022-07-25 | End: 2022-07-25

## 2022-07-25 RX ORDER — ENOXAPARIN SODIUM 100 MG/ML
40 INJECTION SUBCUTANEOUS NIGHTLY
Status: DISCONTINUED | OUTPATIENT
Start: 2022-07-25 | End: 2022-08-11 | Stop reason: HOSPADM

## 2022-07-25 RX ORDER — BUSPIRONE HYDROCHLORIDE 5 MG/1
5 TABLET ORAL 2 TIMES DAILY PRN
Status: DISCONTINUED | OUTPATIENT
Start: 2022-07-25 | End: 2022-08-11 | Stop reason: HOSPADM

## 2022-07-25 RX ORDER — METHYLPREDNISOLONE SODIUM SUCCINATE 125 MG/2ML
60 INJECTION, POWDER, LYOPHILIZED, FOR SOLUTION INTRAMUSCULAR; INTRAVENOUS EVERY 8 HOURS
Status: DISCONTINUED | OUTPATIENT
Start: 2022-07-25 | End: 2022-07-28

## 2022-07-25 RX ORDER — ONDANSETRON 4 MG/1
4 TABLET, ORALLY DISINTEGRATING ORAL EVERY 6 HOURS PRN
Status: CANCELLED | OUTPATIENT
Start: 2022-07-25

## 2022-07-25 RX ORDER — ALBUTEROL SULFATE 90 UG/1
2 AEROSOL, METERED RESPIRATORY (INHALATION)
Status: DISCONTINUED | OUTPATIENT
Start: 2022-07-25 | End: 2022-08-11 | Stop reason: HOSPADM

## 2022-07-25 RX ORDER — ONDANSETRON 2 MG/ML
4 INJECTION INTRAMUSCULAR; INTRAVENOUS EVERY 6 HOURS PRN
Status: DISCONTINUED | OUTPATIENT
Start: 2022-07-25 | End: 2022-08-11 | Stop reason: HOSPADM

## 2022-07-25 RX ORDER — ATORVASTATIN CALCIUM 40 MG/1
40 TABLET, FILM COATED ORAL NIGHTLY
Status: CANCELLED | OUTPATIENT
Start: 2022-07-25

## 2022-07-25 RX ORDER — ASPIRIN 300 MG/1
300 SUPPOSITORY RECTAL DAILY
Status: DISCONTINUED | OUTPATIENT
Start: 2022-07-26 | End: 2022-07-29

## 2022-07-25 RX ORDER — IPRATROPIUM BROMIDE AND ALBUTEROL SULFATE 2.5; .5 MG/3ML; MG/3ML
3 SOLUTION RESPIRATORY (INHALATION) EVERY 4 HOURS PRN
Status: CANCELLED | OUTPATIENT
Start: 2022-07-25

## 2022-07-25 RX ORDER — ACETAMINOPHEN 325 MG/1
650 TABLET ORAL EVERY 4 HOURS PRN
Status: DISCONTINUED | OUTPATIENT
Start: 2022-07-25 | End: 2022-08-11 | Stop reason: HOSPADM

## 2022-07-25 RX ORDER — GUAIFENESIN 600 MG/1
1200 TABLET, EXTENDED RELEASE ORAL 2 TIMES DAILY
COMMUNITY

## 2022-07-25 RX ORDER — POLYETHYLENE GLYCOL 3350 17 G/17G
17 POWDER, FOR SOLUTION ORAL DAILY PRN
Status: DISCONTINUED | OUTPATIENT
Start: 2022-07-25 | End: 2022-08-11 | Stop reason: HOSPADM

## 2022-07-25 RX ORDER — ALBUTEROL SULFATE 1.25 MG/3ML
1 SOLUTION RESPIRATORY (INHALATION) 2 TIMES DAILY PRN
COMMUNITY

## 2022-07-25 RX ORDER — LORAZEPAM 2 MG/ML
1 INJECTION INTRAMUSCULAR
Status: DISCONTINUED | OUTPATIENT
Start: 2022-07-25 | End: 2022-07-26

## 2022-07-25 RX ORDER — ASPIRIN 81 MG/1
81 TABLET ORAL DAILY
Status: CANCELLED | OUTPATIENT
Start: 2022-07-25

## 2022-07-25 RX ORDER — LORAZEPAM 2 MG/1
2 TABLET ORAL
Status: DISCONTINUED | OUTPATIENT
Start: 2022-07-25 | End: 2022-07-26

## 2022-07-25 RX ADMIN — DOXYCYCLINE 100 MG: 100 INJECTION, POWDER, LYOPHILIZED, FOR SOLUTION INTRAVENOUS at 21:24

## 2022-07-25 RX ADMIN — REMDESIVIR 200 MG: 100 INJECTION, POWDER, LYOPHILIZED, FOR SOLUTION INTRAVENOUS at 09:39

## 2022-07-25 RX ADMIN — IPRATROPIUM BROMIDE AND ALBUTEROL SULFATE 3 ML: .5; 3 SOLUTION RESPIRATORY (INHALATION) at 01:35

## 2022-07-25 RX ADMIN — DEXAMETHASONE SODIUM PHOSPHATE 10 MG: 10 INJECTION INTRAMUSCULAR; INTRAVENOUS at 01:45

## 2022-07-25 RX ADMIN — ENOXAPARIN SODIUM 40 MG: 40 INJECTION SUBCUTANEOUS at 21:25

## 2022-07-25 RX ADMIN — NICOTINE TRANSDERMAL SYSTEM 1 PATCH: 21 PATCH, EXTENDED RELEASE TRANSDERMAL at 14:45

## 2022-07-25 RX ADMIN — Medication 10 ML: at 21:26

## 2022-07-25 RX ADMIN — DOXYCYCLINE 100 MG: 100 INJECTION, POWDER, LYOPHILIZED, FOR SOLUTION INTRAVENOUS at 08:41

## 2022-07-25 RX ADMIN — CEFTRIAXONE 2 G: 2 INJECTION, POWDER, FOR SOLUTION INTRAMUSCULAR; INTRAVENOUS at 08:21

## 2022-07-25 RX ADMIN — IPRATROPIUM BROMIDE AND ALBUTEROL SULFATE 3 ML: .5; 3 SOLUTION RESPIRATORY (INHALATION) at 02:06

## 2022-07-25 RX ADMIN — ACETAMINOPHEN 650 MG: 650 SUPPOSITORY RECTAL at 06:42

## 2022-07-25 RX ADMIN — METHYLPREDNISOLONE SODIUM SUCCINATE 60 MG: 125 INJECTION, POWDER, FOR SOLUTION INTRAMUSCULAR; INTRAVENOUS at 21:25

## 2022-07-25 RX ADMIN — LORAZEPAM 2 MG: 2 INJECTION INTRAMUSCULAR; INTRAVENOUS at 19:46

## 2022-07-25 RX ADMIN — SODIUM CHLORIDE 100 ML/HR: 9 INJECTION, SOLUTION INTRAVENOUS at 18:19

## 2022-07-25 RX ADMIN — ASPIRIN 300 MG: 300 SUPPOSITORY RECTAL at 01:33

## 2022-07-25 RX ADMIN — IPRATROPIUM BROMIDE AND ALBUTEROL SULFATE 3 ML: .5; 3 SOLUTION RESPIRATORY (INHALATION) at 01:50

## 2022-07-26 ENCOUNTER — APPOINTMENT (OUTPATIENT)
Dept: CARDIOLOGY | Facility: HOSPITAL | Age: 57
End: 2022-07-26

## 2022-07-26 ENCOUNTER — APPOINTMENT (OUTPATIENT)
Dept: GENERAL RADIOLOGY | Facility: HOSPITAL | Age: 57
End: 2022-07-26

## 2022-07-26 LAB
A-A DO2: 103.6 MMHG (ref 0–300)
A-A DO2: 104.2 MMHG (ref 0–300)
ALBUMIN SERPL-MCNC: 4.06 G/DL (ref 3.5–5.2)
ALBUMIN/GLOB SERPL: 1.7 G/DL
ALP SERPL-CCNC: 43 U/L (ref 39–117)
ALT SERPL W P-5'-P-CCNC: 16 U/L (ref 1–41)
ANION GAP SERPL CALCULATED.3IONS-SCNC: 7.2 MMOL/L (ref 5–15)
ARTERIAL PATENCY WRIST A: POSITIVE
ARTERIAL PATENCY WRIST A: POSITIVE
AST SERPL-CCNC: 21 U/L (ref 1–40)
ATMOSPHERIC PRESS: 730 MMHG
ATMOSPHERIC PRESS: 730 MMHG
BASE EXCESS BLDA CALC-SCNC: 10.2 MMOL/L (ref 0–2)
BASE EXCESS BLDA CALC-SCNC: 9.9 MMOL/L (ref 0–2)
BASOPHILS # BLD AUTO: 0.02 10*3/MM3 (ref 0–0.2)
BASOPHILS NFR BLD AUTO: 0.2 % (ref 0–1.5)
BDY SITE: ABNORMAL
BDY SITE: ABNORMAL
BH CV ECHO MEAS - ACS: 2.2 CM
BH CV ECHO MEAS - AO MAX PG: 8 MMHG
BH CV ECHO MEAS - AO MEAN PG: 4 MMHG
BH CV ECHO MEAS - AO ROOT DIAM: 3.4 CM
BH CV ECHO MEAS - AO V2 MAX: 141 CM/SEC
BH CV ECHO MEAS - AO V2 VTI: 24.7 CM
BH CV ECHO MEAS - EDV(CUBED): 110.6 ML
BH CV ECHO MEAS - EDV(MOD-SP4): 64.6 ML
BH CV ECHO MEAS - EF(MOD-SP4): 61.5 %
BH CV ECHO MEAS - ESV(CUBED): 17.6 ML
BH CV ECHO MEAS - ESV(MOD-SP4): 24.9 ML
BH CV ECHO MEAS - FS: 45.8 %
BH CV ECHO MEAS - IVS/LVPW: 1 CM
BH CV ECHO MEAS - IVSD: 0.8 CM
BH CV ECHO MEAS - LA DIMENSION: 3.5 CM
BH CV ECHO MEAS - LV DIASTOLIC VOL/BSA (35-75): 33.2 CM2
BH CV ECHO MEAS - LV MASS(C)D: 126.7 GRAMS
BH CV ECHO MEAS - LV SYSTOLIC VOL/BSA (12-30): 12.8 CM2
BH CV ECHO MEAS - LVIDD: 4.8 CM
BH CV ECHO MEAS - LVIDS: 2.6 CM
BH CV ECHO MEAS - LVPWD: 0.8 CM
BH CV ECHO MEAS - MED PEAK E' VEL: 9.4 CM/SEC
BH CV ECHO MEAS - MV A MAX VEL: 76.6 CM/SEC
BH CV ECHO MEAS - MV E MAX VEL: 64.5 CM/SEC
BH CV ECHO MEAS - MV E/A: 0.84
BH CV ECHO MEAS - PA ACC TIME: 0.08 SEC
BH CV ECHO MEAS - PA PR(ACCEL): 41.2 MMHG
BH CV ECHO MEAS - SI(MOD-SP4): 20.4 ML/M2
BH CV ECHO MEAS - SV(MOD-SP4): 39.7 ML
BILIRUB SERPL-MCNC: 0.3 MG/DL (ref 0–1.2)
BODY TEMPERATURE: 0 C
BODY TEMPERATURE: 0 C
BUN SERPL-MCNC: 18 MG/DL (ref 6–20)
BUN/CREAT SERPL: 35.3 (ref 7–25)
CALCIUM SPEC-SCNC: 9.3 MG/DL (ref 8.6–10.5)
CHLORIDE SERPL-SCNC: 93 MMOL/L (ref 98–107)
CHOLEST SERPL-MCNC: 178 MG/DL (ref 0–200)
CO2 BLDA-SCNC: 42.8 MMOL/L (ref 22–33)
CO2 BLDA-SCNC: 43.7 MMOL/L (ref 22–33)
CO2 SERPL-SCNC: 33.8 MMOL/L (ref 22–29)
COHGB MFR BLD: 1.2 % (ref 0–5)
COHGB MFR BLD: 1.2 % (ref 0–5)
CREAT SERPL-MCNC: 0.51 MG/DL (ref 0.76–1.27)
CRP SERPL-MCNC: 2.09 MG/DL (ref 0–0.5)
DEPRECATED RDW RBC AUTO: 44.5 FL (ref 37–54)
EGFRCR SERPLBLD CKD-EPI 2021: 119.7 ML/MIN/1.73
EOSINOPHIL # BLD AUTO: 0 10*3/MM3 (ref 0–0.4)
EOSINOPHIL NFR BLD AUTO: 0 % (ref 0.3–6.2)
EPAP: 8
EPAP: 8
ERYTHROCYTE [DISTWIDTH] IN BLOOD BY AUTOMATED COUNT: 12.1 % (ref 12.3–15.4)
GLOBULIN UR ELPH-MCNC: 2.3 GM/DL
GLUCOSE BLDC GLUCOMTR-MCNC: 96 MG/DL (ref 70–130)
GLUCOSE SERPL-MCNC: 90 MG/DL (ref 65–99)
HCO3 BLDA-SCNC: 40.3 MMOL/L (ref 20–26)
HCO3 BLDA-SCNC: 40.9 MMOL/L (ref 20–26)
HCT VFR BLD AUTO: 44.7 % (ref 37.5–51)
HCT VFR BLD CALC: 41.3 % (ref 38–51)
HCT VFR BLD CALC: 42.1 % (ref 38–51)
HDLC SERPL-MCNC: 35 MG/DL (ref 40–60)
HGB BLD-MCNC: 14.1 G/DL (ref 13–17.7)
HGB BLDA-MCNC: 13.5 G/DL (ref 14–18)
HGB BLDA-MCNC: 13.7 G/DL (ref 14–18)
IMM GRANULOCYTES # BLD AUTO: 0.08 10*3/MM3 (ref 0–0.05)
IMM GRANULOCYTES NFR BLD AUTO: 0.6 % (ref 0–0.5)
INHALED O2 CONCENTRATION: 40 %
INHALED O2 CONCENTRATION: 40 %
IPAP: 24
IPAP: 24
L PNEUMO1 AG UR QL IA: NEGATIVE
LDLC SERPL CALC-MCNC: 121 MG/DL (ref 0–100)
LDLC/HDLC SERPL: 3.41 {RATIO}
LEFT ATRIUM VOLUME INDEX: 15.7 ML/M2
LYMPHOCYTES # BLD AUTO: 1.06 10*3/MM3 (ref 0.7–3.1)
LYMPHOCYTES NFR BLD AUTO: 8.5 % (ref 19.6–45.3)
Lab: ABNORMAL
MAXIMAL PREDICTED HEART RATE: 165 BPM
MCH RBC QN AUTO: 31.3 PG (ref 26.6–33)
MCHC RBC AUTO-ENTMCNC: 31.5 G/DL (ref 31.5–35.7)
MCV RBC AUTO: 99.1 FL (ref 79–97)
METHGB BLD QL: 0.2 % (ref 0–3)
METHGB BLD QL: 0.3 % (ref 0–3)
MODALITY: ABNORMAL
MODALITY: ABNORMAL
MONOCYTES # BLD AUTO: 1.15 10*3/MM3 (ref 0.1–0.9)
MONOCYTES NFR BLD AUTO: 9.2 % (ref 5–12)
NEUTROPHILS NFR BLD AUTO: 10.14 10*3/MM3 (ref 1.7–7)
NEUTROPHILS NFR BLD AUTO: 81.5 % (ref 42.7–76)
NOTE: ABNORMAL
NOTE: ABNORMAL
NOTIFIED BY: ABNORMAL
NOTIFIED BY: ABNORMAL
NOTIFIED WHO: ABNORMAL
NOTIFIED WHO: ABNORMAL
NRBC BLD AUTO-RTO: 0 /100 WBC (ref 0–0.2)
OXYHGB MFR BLDV: 92 % (ref 94–99)
OXYHGB MFR BLDV: 93.8 % (ref 94–99)
PCO2 BLDA: 83.4 MM HG (ref 35–45)
PCO2 BLDA: 91 MM HG (ref 35–45)
PCO2 TEMP ADJ BLD: ABNORMAL MM[HG]
PCO2 TEMP ADJ BLD: ABNORMAL MM[HG]
PH BLDA: 7.26 PH UNITS (ref 7.35–7.45)
PH BLDA: 7.29 PH UNITS (ref 7.35–7.45)
PH, TEMP CORRECTED: ABNORMAL
PH, TEMP CORRECTED: ABNORMAL
PLATELET # BLD AUTO: 211 10*3/MM3 (ref 140–450)
PMV BLD AUTO: 10 FL (ref 6–12)
PO2 BLDA: 69.8 MM HG (ref 83–108)
PO2 BLDA: 77.3 MM HG (ref 83–108)
PO2 TEMP ADJ BLD: ABNORMAL MM[HG]
PO2 TEMP ADJ BLD: ABNORMAL MM[HG]
POTASSIUM SERPL-SCNC: 4.9 MMOL/L (ref 3.5–5.2)
PROT SERPL-MCNC: 6.4 G/DL (ref 6–8.5)
RBC # BLD AUTO: 4.51 10*6/MM3 (ref 4.14–5.8)
S PNEUM AG SPEC QL LA: NEGATIVE
SAO2 % BLDCOA: 93.3 % (ref 94–99)
SAO2 % BLDCOA: 95.2 % (ref 94–99)
SET MECH RESP RATE: 28
SET MECH RESP RATE: 28
SODIUM SERPL-SCNC: 134 MMOL/L (ref 136–145)
STRESS TARGET HR: 140 BPM
T3FREE SERPL-MCNC: 1.56 PG/ML (ref 2–4.4)
T4 FREE SERPL-MCNC: 1.27 NG/DL (ref 0.93–1.7)
TRIGL SERPL-MCNC: 119 MG/DL (ref 0–150)
TROPONIN T SERPL-MCNC: <0.01 NG/ML (ref 0–0.03)
VENTILATOR MODE: ABNORMAL
VENTILATOR MODE: ABNORMAL
VLDLC SERPL-MCNC: 22 MG/DL (ref 5–40)
WBC NRBC COR # BLD: 12.45 10*3/MM3 (ref 3.4–10.8)

## 2022-07-26 PROCEDURE — 94799 UNLISTED PULMONARY SVC/PX: CPT

## 2022-07-26 PROCEDURE — 80053 COMPREHEN METABOLIC PANEL: CPT | Performed by: STUDENT IN AN ORGANIZED HEALTH CARE EDUCATION/TRAINING PROGRAM

## 2022-07-26 PROCEDURE — 36600 WITHDRAWAL OF ARTERIAL BLOOD: CPT

## 2022-07-26 PROCEDURE — 84481 FREE ASSAY (FT-3): CPT | Performed by: STUDENT IN AN ORGANIZED HEALTH CARE EDUCATION/TRAINING PROGRAM

## 2022-07-26 PROCEDURE — 84484 ASSAY OF TROPONIN QUANT: CPT | Performed by: STUDENT IN AN ORGANIZED HEALTH CARE EDUCATION/TRAINING PROGRAM

## 2022-07-26 PROCEDURE — 83050 HGB METHEMOGLOBIN QUAN: CPT

## 2022-07-26 PROCEDURE — 82375 ASSAY CARBOXYHB QUANT: CPT

## 2022-07-26 PROCEDURE — 99291 CRITICAL CARE FIRST HOUR: CPT | Performed by: INTERNAL MEDICINE

## 2022-07-26 PROCEDURE — 87449 NOS EACH ORGANISM AG IA: CPT | Performed by: STUDENT IN AN ORGANIZED HEALTH CARE EDUCATION/TRAINING PROGRAM

## 2022-07-26 PROCEDURE — 71045 X-RAY EXAM CHEST 1 VIEW: CPT | Performed by: RADIOLOGY

## 2022-07-26 PROCEDURE — 25010000002 ENOXAPARIN PER 10 MG: Performed by: STUDENT IN AN ORGANIZED HEALTH CARE EDUCATION/TRAINING PROGRAM

## 2022-07-26 PROCEDURE — 25010000002 METHYLPREDNISOLONE PER 125 MG: Performed by: STUDENT IN AN ORGANIZED HEALTH CARE EDUCATION/TRAINING PROGRAM

## 2022-07-26 PROCEDURE — 93306 TTE W/DOPPLER COMPLETE: CPT | Performed by: SPECIALIST

## 2022-07-26 PROCEDURE — 93010 ELECTROCARDIOGRAM REPORT: CPT | Performed by: INTERNAL MEDICINE

## 2022-07-26 PROCEDURE — 82962 GLUCOSE BLOOD TEST: CPT

## 2022-07-26 PROCEDURE — 93005 ELECTROCARDIOGRAM TRACING: CPT | Performed by: STUDENT IN AN ORGANIZED HEALTH CARE EDUCATION/TRAINING PROGRAM

## 2022-07-26 PROCEDURE — 82805 BLOOD GASES W/O2 SATURATION: CPT

## 2022-07-26 PROCEDURE — 25010000002 REMDESIVIR 100 MG RECONSTITUTED SOLUTION: Performed by: STUDENT IN AN ORGANIZED HEALTH CARE EDUCATION/TRAINING PROGRAM

## 2022-07-26 PROCEDURE — 93306 TTE W/DOPPLER COMPLETE: CPT

## 2022-07-26 PROCEDURE — 87899 AGENT NOS ASSAY W/OPTIC: CPT | Performed by: STUDENT IN AN ORGANIZED HEALTH CARE EDUCATION/TRAINING PROGRAM

## 2022-07-26 PROCEDURE — 99291 CRITICAL CARE FIRST HOUR: CPT | Performed by: STUDENT IN AN ORGANIZED HEALTH CARE EDUCATION/TRAINING PROGRAM

## 2022-07-26 PROCEDURE — 25010000002 CEFEPIME PER 500 MG: Performed by: STUDENT IN AN ORGANIZED HEALTH CARE EDUCATION/TRAINING PROGRAM

## 2022-07-26 PROCEDURE — 86140 C-REACTIVE PROTEIN: CPT | Performed by: INTERNAL MEDICINE

## 2022-07-26 PROCEDURE — 80061 LIPID PANEL: CPT | Performed by: STUDENT IN AN ORGANIZED HEALTH CARE EDUCATION/TRAINING PROGRAM

## 2022-07-26 PROCEDURE — 85025 COMPLETE CBC W/AUTO DIFF WBC: CPT | Performed by: STUDENT IN AN ORGANIZED HEALTH CARE EDUCATION/TRAINING PROGRAM

## 2022-07-26 PROCEDURE — 84439 ASSAY OF FREE THYROXINE: CPT | Performed by: STUDENT IN AN ORGANIZED HEALTH CARE EDUCATION/TRAINING PROGRAM

## 2022-07-26 PROCEDURE — 94660 CPAP INITIATION&MGMT: CPT

## 2022-07-26 PROCEDURE — 99233 SBSQ HOSP IP/OBS HIGH 50: CPT | Performed by: PSYCHIATRY & NEUROLOGY

## 2022-07-26 PROCEDURE — 71045 X-RAY EXAM CHEST 1 VIEW: CPT

## 2022-07-26 RX ORDER — CLOPIDOGREL BISULFATE 75 MG/1
75 TABLET ORAL DAILY
Status: DISCONTINUED | OUTPATIENT
Start: 2022-07-26 | End: 2022-08-11 | Stop reason: HOSPADM

## 2022-07-26 RX ORDER — BUDESONIDE AND FORMOTEROL FUMARATE DIHYDRATE 160; 4.5 UG/1; UG/1
2 AEROSOL RESPIRATORY (INHALATION)
Status: DISCONTINUED | OUTPATIENT
Start: 2022-07-26 | End: 2022-08-11 | Stop reason: HOSPADM

## 2022-07-26 RX ORDER — FLUMAZENIL 0.1 MG/ML
0.2 INJECTION INTRAVENOUS ONCE
Status: COMPLETED | OUTPATIENT
Start: 2022-07-26 | End: 2022-07-26

## 2022-07-26 RX ORDER — PANTOPRAZOLE SODIUM 40 MG/10ML
40 INJECTION, POWDER, LYOPHILIZED, FOR SOLUTION INTRAVENOUS
Status: DISCONTINUED | OUTPATIENT
Start: 2022-07-27 | End: 2022-08-01 | Stop reason: ALTCHOICE

## 2022-07-26 RX ADMIN — METHYLPREDNISOLONE SODIUM SUCCINATE 60 MG: 125 INJECTION, POWDER, FOR SOLUTION INTRAMUSCULAR; INTRAVENOUS at 20:37

## 2022-07-26 RX ADMIN — NICOTINE TRANSDERMAL SYSTEM 1 PATCH: 21 PATCH, EXTENDED RELEASE TRANSDERMAL at 12:08

## 2022-07-26 RX ADMIN — FLUMAZENIL 0.2 MG: 0.1 INJECTION, SOLUTION INTRAVENOUS at 00:35

## 2022-07-26 RX ADMIN — SODIUM CHLORIDE 100 ML/HR: 9 INJECTION, SOLUTION INTRAVENOUS at 05:49

## 2022-07-26 RX ADMIN — Medication 10 ML: at 09:59

## 2022-07-26 RX ADMIN — DOXYCYCLINE 100 MG: 100 INJECTION, POWDER, LYOPHILIZED, FOR SOLUTION INTRAVENOUS at 20:37

## 2022-07-26 RX ADMIN — REMDESIVIR 100 MG: 100 INJECTION, POWDER, LYOPHILIZED, FOR SOLUTION INTRAVENOUS at 09:58

## 2022-07-26 RX ADMIN — Medication 10 ML: at 20:39

## 2022-07-26 RX ADMIN — CEFEPIME HYDROCHLORIDE 2 G: 2 INJECTION, POWDER, FOR SOLUTION INTRAVENOUS at 17:53

## 2022-07-26 RX ADMIN — Medication 10 ML: at 10:00

## 2022-07-26 RX ADMIN — ENOXAPARIN SODIUM 40 MG: 40 INJECTION SUBCUTANEOUS at 20:37

## 2022-07-26 RX ADMIN — METHYLPREDNISOLONE SODIUM SUCCINATE 60 MG: 125 INJECTION, POWDER, FOR SOLUTION INTRAMUSCULAR; INTRAVENOUS at 12:08

## 2022-07-26 RX ADMIN — DEXMEDETOMIDINE HYDROCHLORIDE 0.2 MCG/KG/HR: 100 INJECTION, SOLUTION INTRAVENOUS at 09:38

## 2022-07-26 RX ADMIN — METHYLPREDNISOLONE SODIUM SUCCINATE 60 MG: 125 INJECTION, POWDER, FOR SOLUTION INTRAMUSCULAR; INTRAVENOUS at 05:49

## 2022-07-26 RX ADMIN — DOXYCYCLINE 100 MG: 100 INJECTION, POWDER, LYOPHILIZED, FOR SOLUTION INTRAVENOUS at 09:58

## 2022-07-26 RX ADMIN — CEFEPIME HYDROCHLORIDE 2 G: 2 INJECTION, POWDER, FOR SOLUTION INTRAVENOUS at 09:45

## 2022-07-26 RX ADMIN — FLUMAZENIL 0.2 MG: 0.1 INJECTION, SOLUTION INTRAVENOUS at 01:01

## 2022-07-27 LAB
ALBUMIN SERPL-MCNC: 3.54 G/DL (ref 3.5–5.2)
ALBUMIN/GLOB SERPL: 1.6 G/DL
ALP SERPL-CCNC: 38 U/L (ref 39–117)
ALT SERPL W P-5'-P-CCNC: 13 U/L (ref 1–41)
ANION GAP SERPL CALCULATED.3IONS-SCNC: 8.1 MMOL/L (ref 5–15)
AST SERPL-CCNC: 12 U/L (ref 1–40)
BASOPHILS # BLD AUTO: 0 10*3/MM3 (ref 0–0.2)
BASOPHILS NFR BLD AUTO: 0 % (ref 0–1.5)
BILIRUB SERPL-MCNC: 0.3 MG/DL (ref 0–1.2)
BUN SERPL-MCNC: 23 MG/DL (ref 6–20)
BUN/CREAT SERPL: 52.3 (ref 7–25)
CALCIUM SPEC-SCNC: 9.4 MG/DL (ref 8.6–10.5)
CHLORIDE SERPL-SCNC: 98 MMOL/L (ref 98–107)
CO2 SERPL-SCNC: 32.9 MMOL/L (ref 22–29)
CREAT SERPL-MCNC: 0.44 MG/DL (ref 0.76–1.27)
DEPRECATED RDW RBC AUTO: 43.9 FL (ref 37–54)
EGFRCR SERPLBLD CKD-EPI 2021: 125.2 ML/MIN/1.73
EOSINOPHIL # BLD AUTO: 0 10*3/MM3 (ref 0–0.4)
EOSINOPHIL NFR BLD AUTO: 0 % (ref 0.3–6.2)
ERYTHROCYTE [DISTWIDTH] IN BLOOD BY AUTOMATED COUNT: 12.2 % (ref 12.3–15.4)
GLOBULIN UR ELPH-MCNC: 2.2 GM/DL
GLUCOSE SERPL-MCNC: 125 MG/DL (ref 65–99)
HCT VFR BLD AUTO: 40.8 % (ref 37.5–51)
HGB BLD-MCNC: 12.7 G/DL (ref 13–17.7)
IMM GRANULOCYTES # BLD AUTO: 0.05 10*3/MM3 (ref 0–0.05)
IMM GRANULOCYTES NFR BLD AUTO: 0.6 % (ref 0–0.5)
LYMPHOCYTES # BLD AUTO: 0.84 10*3/MM3 (ref 0.7–3.1)
LYMPHOCYTES NFR BLD AUTO: 10.9 % (ref 19.6–45.3)
MCH RBC QN AUTO: 30.5 PG (ref 26.6–33)
MCHC RBC AUTO-ENTMCNC: 31.1 G/DL (ref 31.5–35.7)
MCV RBC AUTO: 97.8 FL (ref 79–97)
MONOCYTES # BLD AUTO: 0.46 10*3/MM3 (ref 0.1–0.9)
MONOCYTES NFR BLD AUTO: 5.9 % (ref 5–12)
NEUTROPHILS NFR BLD AUTO: 6.39 10*3/MM3 (ref 1.7–7)
NEUTROPHILS NFR BLD AUTO: 82.6 % (ref 42.7–76)
NRBC BLD AUTO-RTO: 0 /100 WBC (ref 0–0.2)
PLATELET # BLD AUTO: 258 10*3/MM3 (ref 140–450)
PMV BLD AUTO: 10.2 FL (ref 6–12)
POTASSIUM SERPL-SCNC: 4.9 MMOL/L (ref 3.5–5.2)
POTASSIUM SERPL-SCNC: 4.9 MMOL/L (ref 3.5–5.2)
PROT SERPL-MCNC: 5.7 G/DL (ref 6–8.5)
QT INTERVAL: 332 MS
QT INTERVAL: 350 MS
QT INTERVAL: 390 MS
QT INTERVAL: 418 MS
QTC INTERVAL: 423 MS
QTC INTERVAL: 437 MS
QTC INTERVAL: 441 MS
QTC INTERVAL: 447 MS
RBC # BLD AUTO: 4.17 10*6/MM3 (ref 4.14–5.8)
SODIUM SERPL-SCNC: 139 MMOL/L (ref 136–145)
WBC NRBC COR # BLD: 7.74 10*3/MM3 (ref 3.4–10.8)

## 2022-07-27 PROCEDURE — 99291 CRITICAL CARE FIRST HOUR: CPT | Performed by: STUDENT IN AN ORGANIZED HEALTH CARE EDUCATION/TRAINING PROGRAM

## 2022-07-27 PROCEDURE — 84132 ASSAY OF SERUM POTASSIUM: CPT | Performed by: STUDENT IN AN ORGANIZED HEALTH CARE EDUCATION/TRAINING PROGRAM

## 2022-07-27 PROCEDURE — 25010000002 REMDESIVIR 100 MG RECONSTITUTED SOLUTION: Performed by: STUDENT IN AN ORGANIZED HEALTH CARE EDUCATION/TRAINING PROGRAM

## 2022-07-27 PROCEDURE — 94660 CPAP INITIATION&MGMT: CPT

## 2022-07-27 PROCEDURE — 94799 UNLISTED PULMONARY SVC/PX: CPT

## 2022-07-27 PROCEDURE — 94761 N-INVAS EAR/PLS OXIMETRY MLT: CPT

## 2022-07-27 PROCEDURE — 93005 ELECTROCARDIOGRAM TRACING: CPT | Performed by: STUDENT IN AN ORGANIZED HEALTH CARE EDUCATION/TRAINING PROGRAM

## 2022-07-27 PROCEDURE — 93010 ELECTROCARDIOGRAM REPORT: CPT | Performed by: INTERNAL MEDICINE

## 2022-07-27 PROCEDURE — 85025 COMPLETE CBC W/AUTO DIFF WBC: CPT | Performed by: STUDENT IN AN ORGANIZED HEALTH CARE EDUCATION/TRAINING PROGRAM

## 2022-07-27 PROCEDURE — 80053 COMPREHEN METABOLIC PANEL: CPT | Performed by: STUDENT IN AN ORGANIZED HEALTH CARE EDUCATION/TRAINING PROGRAM

## 2022-07-27 PROCEDURE — 92610 EVALUATE SWALLOWING FUNCTION: CPT

## 2022-07-27 PROCEDURE — 25010000002 CEFEPIME PER 500 MG: Performed by: STUDENT IN AN ORGANIZED HEALTH CARE EDUCATION/TRAINING PROGRAM

## 2022-07-27 PROCEDURE — 25010000002 METHYLPREDNISOLONE PER 125 MG: Performed by: STUDENT IN AN ORGANIZED HEALTH CARE EDUCATION/TRAINING PROGRAM

## 2022-07-27 PROCEDURE — 25010000002 ENOXAPARIN PER 10 MG: Performed by: STUDENT IN AN ORGANIZED HEALTH CARE EDUCATION/TRAINING PROGRAM

## 2022-07-27 RX ADMIN — BUDESONIDE AND FORMOTEROL FUMARATE DIHYDRATE 2 PUFF: 160; 4.5 AEROSOL RESPIRATORY (INHALATION) at 18:40

## 2022-07-27 RX ADMIN — Medication 10 ML: at 20:35

## 2022-07-27 RX ADMIN — ALBUTEROL SULFATE 2 PUFF: 90 AEROSOL, METERED RESPIRATORY (INHALATION) at 18:41

## 2022-07-27 RX ADMIN — DOXYCYCLINE 100 MG: 100 INJECTION, POWDER, LYOPHILIZED, FOR SOLUTION INTRAVENOUS at 08:28

## 2022-07-27 RX ADMIN — DOCUSATE SODIUM 50 MG AND SENNOSIDES 8.6 MG 2 TABLET: 8.6; 5 TABLET, FILM COATED ORAL at 20:34

## 2022-07-27 RX ADMIN — BUSPIRONE HYDROCHLORIDE 5 MG: 5 TABLET ORAL at 16:48

## 2022-07-27 RX ADMIN — Medication 10 ML: at 08:31

## 2022-07-27 RX ADMIN — METOPROLOL TARTRATE 25 MG: 25 TABLET, FILM COATED ORAL at 20:34

## 2022-07-27 RX ADMIN — NICOTINE TRANSDERMAL SYSTEM 1 PATCH: 21 PATCH, EXTENDED RELEASE TRANSDERMAL at 12:50

## 2022-07-27 RX ADMIN — DOXYCYCLINE 100 MG: 100 INJECTION, POWDER, LYOPHILIZED, FOR SOLUTION INTRAVENOUS at 20:33

## 2022-07-27 RX ADMIN — Medication 10 ML: at 08:30

## 2022-07-27 RX ADMIN — METHYLPREDNISOLONE SODIUM SUCCINATE 60 MG: 125 INJECTION, POWDER, FOR SOLUTION INTRAMUSCULAR; INTRAVENOUS at 12:49

## 2022-07-27 RX ADMIN — REMDESIVIR 100 MG: 100 INJECTION, POWDER, LYOPHILIZED, FOR SOLUTION INTRAVENOUS at 08:29

## 2022-07-27 RX ADMIN — ALBUTEROL SULFATE 2 PUFF: 90 AEROSOL, METERED RESPIRATORY (INHALATION) at 12:51

## 2022-07-27 RX ADMIN — ATORVASTATIN CALCIUM 80 MG: 40 TABLET, FILM COATED ORAL at 20:34

## 2022-07-27 RX ADMIN — ASPIRIN 300 MG: 300 SUPPOSITORY RECTAL at 12:50

## 2022-07-27 RX ADMIN — METHYLPREDNISOLONE SODIUM SUCCINATE 60 MG: 125 INJECTION, POWDER, FOR SOLUTION INTRAMUSCULAR; INTRAVENOUS at 20:33

## 2022-07-27 RX ADMIN — METHYLPREDNISOLONE SODIUM SUCCINATE 60 MG: 125 INJECTION, POWDER, FOR SOLUTION INTRAMUSCULAR; INTRAVENOUS at 03:45

## 2022-07-27 RX ADMIN — CEFEPIME HYDROCHLORIDE 2 G: 2 INJECTION, POWDER, FOR SOLUTION INTRAVENOUS at 09:57

## 2022-07-27 RX ADMIN — CEFEPIME HYDROCHLORIDE 2 G: 2 INJECTION, POWDER, FOR SOLUTION INTRAVENOUS at 17:01

## 2022-07-27 RX ADMIN — GUAIFENESIN 1200 MG: 600 TABLET, EXTENDED RELEASE ORAL at 20:34

## 2022-07-27 RX ADMIN — PANTOPRAZOLE SODIUM 40 MG: 40 INJECTION, POWDER, FOR SOLUTION INTRAVENOUS at 05:59

## 2022-07-27 RX ADMIN — FLUTICASONE PROPIONATE 1 SPRAY: 50 SPRAY, METERED NASAL at 08:30

## 2022-07-27 RX ADMIN — CEFEPIME HYDROCHLORIDE 2 G: 2 INJECTION, POWDER, FOR SOLUTION INTRAVENOUS at 02:00

## 2022-07-27 RX ADMIN — ENOXAPARIN SODIUM 40 MG: 40 INJECTION SUBCUTANEOUS at 20:33

## 2022-07-28 ENCOUNTER — APPOINTMENT (OUTPATIENT)
Dept: GENERAL RADIOLOGY | Facility: HOSPITAL | Age: 57
End: 2022-07-28

## 2022-07-28 LAB
A-A DO2: 172.9 MMHG (ref 0–300)
ALBUMIN SERPL-MCNC: 3.55 G/DL (ref 3.5–5.2)
ALBUMIN/GLOB SERPL: 1.8 G/DL
ALP SERPL-CCNC: 35 U/L (ref 39–117)
ALT SERPL W P-5'-P-CCNC: 12 U/L (ref 1–41)
ANION GAP SERPL CALCULATED.3IONS-SCNC: 6.9 MMOL/L (ref 5–15)
ARTERIAL PATENCY WRIST A: ABNORMAL
AST SERPL-CCNC: 13 U/L (ref 1–40)
ATMOSPHERIC PRESS: 727 MMHG
BASE EXCESS BLDA CALC-SCNC: 12.2 MMOL/L (ref 0–2)
BASOPHILS # BLD AUTO: 0.01 10*3/MM3 (ref 0–0.2)
BASOPHILS NFR BLD AUTO: 0.1 % (ref 0–1.5)
BDY SITE: ABNORMAL
BILIRUB SERPL-MCNC: 0.3 MG/DL (ref 0–1.2)
BODY TEMPERATURE: 0 C
BUN SERPL-MCNC: 24 MG/DL (ref 6–20)
BUN/CREAT SERPL: 55.8 (ref 7–25)
CALCIUM SPEC-SCNC: 9 MG/DL (ref 8.6–10.5)
CHLORIDE SERPL-SCNC: 99 MMOL/L (ref 98–107)
CO2 BLDA-SCNC: 38.7 MMOL/L (ref 22–33)
CO2 SERPL-SCNC: 33.1 MMOL/L (ref 22–29)
COHGB MFR BLD: 0.8 % (ref 0–5)
CREAT SERPL-MCNC: 0.43 MG/DL (ref 0.76–1.27)
DEPRECATED RDW RBC AUTO: 44 FL (ref 37–54)
EGFRCR SERPLBLD CKD-EPI 2021: 126.1 ML/MIN/1.73
EOSINOPHIL # BLD AUTO: 0.01 10*3/MM3 (ref 0–0.4)
EOSINOPHIL NFR BLD AUTO: 0.1 % (ref 0.3–6.2)
EPAP: 8
ERYTHROCYTE [DISTWIDTH] IN BLOOD BY AUTOMATED COUNT: 12.3 % (ref 12.3–15.4)
GLOBULIN UR ELPH-MCNC: 2 GM/DL
GLUCOSE SERPL-MCNC: 116 MG/DL (ref 65–99)
HCO3 BLDA-SCNC: 37.2 MMOL/L (ref 20–26)
HCT VFR BLD AUTO: 39.6 % (ref 37.5–51)
HCT VFR BLD CALC: 42.3 % (ref 38–51)
HGB BLD-MCNC: 12.5 G/DL (ref 13–17.7)
HGB BLDA-MCNC: 13.8 G/DL (ref 14–18)
IMM GRANULOCYTES # BLD AUTO: 0.06 10*3/MM3 (ref 0–0.05)
IMM GRANULOCYTES NFR BLD AUTO: 0.5 % (ref 0–0.5)
INHALED O2 CONCENTRATION: 40 %
IPAP: 24
LYMPHOCYTES # BLD AUTO: 0.8 10*3/MM3 (ref 0.7–3.1)
LYMPHOCYTES NFR BLD AUTO: 6.5 % (ref 19.6–45.3)
Lab: ABNORMAL
Lab: ABNORMAL
MAGNESIUM SERPL-MCNC: 2.2 MG/DL (ref 1.6–2.6)
MCH RBC QN AUTO: 30.6 PG (ref 26.6–33)
MCHC RBC AUTO-ENTMCNC: 31.6 G/DL (ref 31.5–35.7)
MCV RBC AUTO: 97.1 FL (ref 79–97)
METHGB BLD QL: 0.1 % (ref 0–3)
MODALITY: ABNORMAL
MONOCYTES # BLD AUTO: 0.82 10*3/MM3 (ref 0.1–0.9)
MONOCYTES NFR BLD AUTO: 6.6 % (ref 5–12)
NEUTROPHILS NFR BLD AUTO: 10.69 10*3/MM3 (ref 1.7–7)
NEUTROPHILS NFR BLD AUTO: 86.2 % (ref 42.7–76)
NOTE: ABNORMAL
NOTIFIED BY: ABNORMAL
NOTIFIED WHO: ABNORMAL
NRBC BLD AUTO-RTO: 0 /100 WBC (ref 0–0.2)
OXYHGB MFR BLDV: 86.2 % (ref 94–99)
PCO2 BLDA: 48.2 MM HG (ref 35–45)
PCO2 TEMP ADJ BLD: ABNORMAL MM[HG]
PH BLDA: 7.5 PH UNITS (ref 7.35–7.45)
PH, TEMP CORRECTED: ABNORMAL
PLATELET # BLD AUTO: 279 10*3/MM3 (ref 140–450)
PMV BLD AUTO: 10.1 FL (ref 6–12)
PO2 BLDA: 46.3 MM HG (ref 83–108)
PO2 TEMP ADJ BLD: ABNORMAL MM[HG]
POTASSIUM SERPL-SCNC: 4.4 MMOL/L (ref 3.5–5.2)
PROCALCITONIN SERPL-MCNC: 0.05 NG/ML (ref 0–0.25)
PROT SERPL-MCNC: 5.5 G/DL (ref 6–8.5)
RBC # BLD AUTO: 4.08 10*6/MM3 (ref 4.14–5.8)
SAO2 % BLDCOA: 87 % (ref 94–99)
SET MECH RESP RATE: 28
SODIUM SERPL-SCNC: 139 MMOL/L (ref 136–145)
VENTILATOR MODE: ABNORMAL
WBC NRBC COR # BLD: 12.39 10*3/MM3 (ref 3.4–10.8)

## 2022-07-28 PROCEDURE — 83050 HGB METHEMOGLOBIN QUAN: CPT

## 2022-07-28 PROCEDURE — 82805 BLOOD GASES W/O2 SATURATION: CPT

## 2022-07-28 PROCEDURE — 36600 WITHDRAWAL OF ARTERIAL BLOOD: CPT

## 2022-07-28 PROCEDURE — 99291 CRITICAL CARE FIRST HOUR: CPT | Performed by: STUDENT IN AN ORGANIZED HEALTH CARE EDUCATION/TRAINING PROGRAM

## 2022-07-28 PROCEDURE — 94799 UNLISTED PULMONARY SVC/PX: CPT

## 2022-07-28 PROCEDURE — 82375 ASSAY CARBOXYHB QUANT: CPT

## 2022-07-28 PROCEDURE — 94660 CPAP INITIATION&MGMT: CPT

## 2022-07-28 PROCEDURE — 83735 ASSAY OF MAGNESIUM: CPT | Performed by: STUDENT IN AN ORGANIZED HEALTH CARE EDUCATION/TRAINING PROGRAM

## 2022-07-28 PROCEDURE — 80053 COMPREHEN METABOLIC PANEL: CPT | Performed by: STUDENT IN AN ORGANIZED HEALTH CARE EDUCATION/TRAINING PROGRAM

## 2022-07-28 PROCEDURE — 94761 N-INVAS EAR/PLS OXIMETRY MLT: CPT

## 2022-07-28 PROCEDURE — 92610 EVALUATE SWALLOWING FUNCTION: CPT

## 2022-07-28 PROCEDURE — 25010000002 METHYLPREDNISOLONE PER 40 MG: Performed by: INTERNAL MEDICINE

## 2022-07-28 PROCEDURE — 71045 X-RAY EXAM CHEST 1 VIEW: CPT | Performed by: RADIOLOGY

## 2022-07-28 PROCEDURE — 97163 PT EVAL HIGH COMPLEX 45 MIN: CPT

## 2022-07-28 PROCEDURE — 25010000002 REMDESIVIR 100 MG RECONSTITUTED SOLUTION: Performed by: STUDENT IN AN ORGANIZED HEALTH CARE EDUCATION/TRAINING PROGRAM

## 2022-07-28 PROCEDURE — 25010000002 CEFEPIME PER 500 MG: Performed by: STUDENT IN AN ORGANIZED HEALTH CARE EDUCATION/TRAINING PROGRAM

## 2022-07-28 PROCEDURE — 25010000002 LORAZEPAM PER 2 MG: Performed by: STUDENT IN AN ORGANIZED HEALTH CARE EDUCATION/TRAINING PROGRAM

## 2022-07-28 PROCEDURE — 25010000002 METHYLPREDNISOLONE PER 125 MG: Performed by: STUDENT IN AN ORGANIZED HEALTH CARE EDUCATION/TRAINING PROGRAM

## 2022-07-28 PROCEDURE — 85025 COMPLETE CBC W/AUTO DIFF WBC: CPT | Performed by: STUDENT IN AN ORGANIZED HEALTH CARE EDUCATION/TRAINING PROGRAM

## 2022-07-28 PROCEDURE — 25010000002 FUROSEMIDE PER 20 MG: Performed by: INTERNAL MEDICINE

## 2022-07-28 PROCEDURE — 84145 PROCALCITONIN (PCT): CPT | Performed by: STUDENT IN AN ORGANIZED HEALTH CARE EDUCATION/TRAINING PROGRAM

## 2022-07-28 PROCEDURE — 25010000002 ENOXAPARIN PER 10 MG: Performed by: STUDENT IN AN ORGANIZED HEALTH CARE EDUCATION/TRAINING PROGRAM

## 2022-07-28 PROCEDURE — 71045 X-RAY EXAM CHEST 1 VIEW: CPT

## 2022-07-28 RX ORDER — LORAZEPAM 1 MG/1
1 TABLET ORAL
Status: DISCONTINUED | OUTPATIENT
Start: 2022-07-31 | End: 2022-07-30

## 2022-07-28 RX ORDER — LORAZEPAM 1 MG/1
1 TABLET ORAL EVERY 4 HOURS PRN
Status: DISCONTINUED | OUTPATIENT
Start: 2022-07-31 | End: 2022-07-30

## 2022-07-28 RX ORDER — LORAZEPAM 2 MG/ML
0.5 INJECTION INTRAMUSCULAR ONCE
Status: COMPLETED | OUTPATIENT
Start: 2022-07-28 | End: 2022-07-28

## 2022-07-28 RX ORDER — LORAZEPAM 0.5 MG/1
0.5 TABLET ORAL EVERY 4 HOURS PRN
Status: DISCONTINUED | OUTPATIENT
Start: 2022-08-01 | End: 2022-07-30

## 2022-07-28 RX ORDER — LORAZEPAM 2 MG/1
2 TABLET ORAL EVERY 4 HOURS PRN
Status: DISCONTINUED | OUTPATIENT
Start: 2022-07-29 | End: 2022-07-30

## 2022-07-28 RX ORDER — LORAZEPAM 0.5 MG/1
0.5 TABLET ORAL
Status: DISCONTINUED | OUTPATIENT
Start: 2022-08-01 | End: 2022-07-30

## 2022-07-28 RX ORDER — METHYLPREDNISOLONE SODIUM SUCCINATE 40 MG/ML
40 INJECTION, POWDER, LYOPHILIZED, FOR SOLUTION INTRAMUSCULAR; INTRAVENOUS EVERY 8 HOURS
Status: DISCONTINUED | OUTPATIENT
Start: 2022-07-28 | End: 2022-07-30

## 2022-07-28 RX ORDER — LORAZEPAM 2 MG/1
2 TABLET ORAL
Status: ACTIVE | OUTPATIENT
Start: 2022-07-28 | End: 2022-07-29

## 2022-07-28 RX ORDER — LORAZEPAM 2 MG/1
2 TABLET ORAL
Status: DISCONTINUED | OUTPATIENT
Start: 2022-07-29 | End: 2022-07-30

## 2022-07-28 RX ORDER — FUROSEMIDE 10 MG/ML
40 INJECTION INTRAMUSCULAR; INTRAVENOUS ONCE
Status: COMPLETED | OUTPATIENT
Start: 2022-07-28 | End: 2022-07-28

## 2022-07-28 RX ADMIN — ALBUTEROL SULFATE 2 PUFF: 90 AEROSOL, METERED RESPIRATORY (INHALATION) at 00:27

## 2022-07-28 RX ADMIN — DOXYCYCLINE 100 MG: 100 INJECTION, POWDER, LYOPHILIZED, FOR SOLUTION INTRAVENOUS at 08:32

## 2022-07-28 RX ADMIN — METHYLPREDNISOLONE SODIUM SUCCINATE 60 MG: 125 INJECTION, POWDER, FOR SOLUTION INTRAMUSCULAR; INTRAVENOUS at 11:32

## 2022-07-28 RX ADMIN — GUAIFENESIN 1200 MG: 600 TABLET, EXTENDED RELEASE ORAL at 08:32

## 2022-07-28 RX ADMIN — DOXYCYCLINE 100 MG: 100 INJECTION, POWDER, LYOPHILIZED, FOR SOLUTION INTRAVENOUS at 20:41

## 2022-07-28 RX ADMIN — GUAIFENESIN 1200 MG: 600 TABLET, EXTENDED RELEASE ORAL at 20:43

## 2022-07-28 RX ADMIN — BUDESONIDE AND FORMOTEROL FUMARATE DIHYDRATE 2 PUFF: 160; 4.5 AEROSOL RESPIRATORY (INHALATION) at 06:55

## 2022-07-28 RX ADMIN — METOPROLOL TARTRATE 25 MG: 25 TABLET, FILM COATED ORAL at 20:42

## 2022-07-28 RX ADMIN — FLUTICASONE PROPIONATE 1 SPRAY: 50 SPRAY, METERED NASAL at 08:35

## 2022-07-28 RX ADMIN — PANTOPRAZOLE SODIUM 40 MG: 40 INJECTION, POWDER, FOR SOLUTION INTRAVENOUS at 06:14

## 2022-07-28 RX ADMIN — FUROSEMIDE 40 MG: 10 INJECTION, SOLUTION INTRAMUSCULAR; INTRAVENOUS at 13:26

## 2022-07-28 RX ADMIN — DOCUSATE SODIUM 50 MG AND SENNOSIDES 8.6 MG 2 TABLET: 8.6; 5 TABLET, FILM COATED ORAL at 20:42

## 2022-07-28 RX ADMIN — ATORVASTATIN CALCIUM 80 MG: 40 TABLET, FILM COATED ORAL at 20:42

## 2022-07-28 RX ADMIN — Medication 10 ML: at 20:43

## 2022-07-28 RX ADMIN — ALBUTEROL SULFATE 2 PUFF: 90 AEROSOL, METERED RESPIRATORY (INHALATION) at 06:55

## 2022-07-28 RX ADMIN — ACETAMINOPHEN 325MG 650 MG: 325 TABLET ORAL at 08:56

## 2022-07-28 RX ADMIN — DOCUSATE SODIUM 50 MG AND SENNOSIDES 8.6 MG 2 TABLET: 8.6; 5 TABLET, FILM COATED ORAL at 08:32

## 2022-07-28 RX ADMIN — ENOXAPARIN SODIUM 40 MG: 40 INJECTION SUBCUTANEOUS at 20:41

## 2022-07-28 RX ADMIN — LORAZEPAM 0.5 MG: 2 INJECTION INTRAMUSCULAR; INTRAVENOUS at 19:17

## 2022-07-28 RX ADMIN — CEFEPIME HYDROCHLORIDE 2 G: 2 INJECTION, POWDER, FOR SOLUTION INTRAVENOUS at 17:30

## 2022-07-28 RX ADMIN — CLOPIDOGREL 75 MG: 75 TABLET, FILM COATED ORAL at 08:31

## 2022-07-28 RX ADMIN — REMDESIVIR 100 MG: 100 INJECTION, POWDER, LYOPHILIZED, FOR SOLUTION INTRAVENOUS at 08:32

## 2022-07-28 RX ADMIN — ASPIRIN 81 MG: 81 TABLET, CHEWABLE ORAL at 08:32

## 2022-07-28 RX ADMIN — CETIRIZINE HYDROCHLORIDE 10 MG: 10 TABLET, FILM COATED ORAL at 08:32

## 2022-07-28 RX ADMIN — METHYLPREDNISOLONE SODIUM SUCCINATE 60 MG: 125 INJECTION, POWDER, FOR SOLUTION INTRAMUSCULAR; INTRAVENOUS at 03:00

## 2022-07-28 RX ADMIN — BUSPIRONE HYDROCHLORIDE 5 MG: 5 TABLET ORAL at 08:56

## 2022-07-28 RX ADMIN — METOPROLOL TARTRATE 25 MG: 25 TABLET, FILM COATED ORAL at 08:31

## 2022-07-28 RX ADMIN — CEFEPIME HYDROCHLORIDE 2 G: 2 INJECTION, POWDER, FOR SOLUTION INTRAVENOUS at 11:31

## 2022-07-28 RX ADMIN — Medication 10 ML: at 08:35

## 2022-07-28 RX ADMIN — Medication 10 ML: at 20:44

## 2022-07-28 RX ADMIN — ALBUTEROL SULFATE 2 PUFF: 90 AEROSOL, METERED RESPIRATORY (INHALATION) at 12:55

## 2022-07-28 RX ADMIN — METHYLPREDNISOLONE SODIUM SUCCINATE 40 MG: 40 INJECTION, POWDER, FOR SOLUTION INTRAMUSCULAR; INTRAVENOUS at 20:43

## 2022-07-28 RX ADMIN — CEFEPIME HYDROCHLORIDE 2 G: 2 INJECTION, POWDER, FOR SOLUTION INTRAVENOUS at 02:56

## 2022-07-29 ENCOUNTER — APPOINTMENT (OUTPATIENT)
Dept: MRI IMAGING | Facility: HOSPITAL | Age: 57
End: 2022-07-29

## 2022-07-29 LAB
ALBUMIN SERPL-MCNC: 3.44 G/DL (ref 3.5–5.2)
ALBUMIN/GLOB SERPL: 1.8 G/DL
ALP SERPL-CCNC: 35 U/L (ref 39–117)
ALT SERPL W P-5'-P-CCNC: 11 U/L (ref 1–41)
ANION GAP SERPL CALCULATED.3IONS-SCNC: 7.2 MMOL/L (ref 5–15)
AST SERPL-CCNC: 13 U/L (ref 1–40)
BACTERIA SPEC AEROBE CULT: NORMAL
BACTERIA SPEC AEROBE CULT: NORMAL
BASOPHILS # BLD AUTO: 0.01 10*3/MM3 (ref 0–0.2)
BASOPHILS NFR BLD AUTO: 0.1 % (ref 0–1.5)
BILIRUB SERPL-MCNC: 0.3 MG/DL (ref 0–1.2)
BUN SERPL-MCNC: 19 MG/DL (ref 6–20)
BUN/CREAT SERPL: 51.4 (ref 7–25)
CALCIUM SPEC-SCNC: 8.6 MG/DL (ref 8.6–10.5)
CHLORIDE SERPL-SCNC: 96 MMOL/L (ref 98–107)
CO2 SERPL-SCNC: 33.8 MMOL/L (ref 22–29)
CREAT SERPL-MCNC: 0.37 MG/DL (ref 0.76–1.27)
DEPRECATED RDW RBC AUTO: 42.8 FL (ref 37–54)
EGFRCR SERPLBLD CKD-EPI 2021: 131.9 ML/MIN/1.73
EOSINOPHIL # BLD AUTO: 0.01 10*3/MM3 (ref 0–0.4)
EOSINOPHIL NFR BLD AUTO: 0.1 % (ref 0.3–6.2)
ERYTHROCYTE [DISTWIDTH] IN BLOOD BY AUTOMATED COUNT: 12.3 % (ref 12.3–15.4)
GLOBULIN UR ELPH-MCNC: 2 GM/DL
GLUCOSE SERPL-MCNC: 160 MG/DL (ref 65–99)
HCT VFR BLD AUTO: 40.5 % (ref 37.5–51)
HGB BLD-MCNC: 13.3 G/DL (ref 13–17.7)
IMM GRANULOCYTES # BLD AUTO: 0.09 10*3/MM3 (ref 0–0.05)
IMM GRANULOCYTES NFR BLD AUTO: 0.7 % (ref 0–0.5)
LYMPHOCYTES # BLD AUTO: 0.82 10*3/MM3 (ref 0.7–3.1)
LYMPHOCYTES NFR BLD AUTO: 6.4 % (ref 19.6–45.3)
MCH RBC QN AUTO: 31.1 PG (ref 26.6–33)
MCHC RBC AUTO-ENTMCNC: 32.8 G/DL (ref 31.5–35.7)
MCV RBC AUTO: 94.8 FL (ref 79–97)
MONOCYTES # BLD AUTO: 1.21 10*3/MM3 (ref 0.1–0.9)
MONOCYTES NFR BLD AUTO: 9.5 % (ref 5–12)
NEUTROPHILS NFR BLD AUTO: 10.58 10*3/MM3 (ref 1.7–7)
NEUTROPHILS NFR BLD AUTO: 83.2 % (ref 42.7–76)
NRBC BLD AUTO-RTO: 0 /100 WBC (ref 0–0.2)
PLATELET # BLD AUTO: 309 10*3/MM3 (ref 140–450)
PMV BLD AUTO: 10.4 FL (ref 6–12)
POTASSIUM SERPL-SCNC: 4 MMOL/L (ref 3.5–5.2)
PROT SERPL-MCNC: 5.4 G/DL (ref 6–8.5)
RBC # BLD AUTO: 4.27 10*6/MM3 (ref 4.14–5.8)
SODIUM SERPL-SCNC: 137 MMOL/L (ref 136–145)
WBC NRBC COR # BLD: 12.72 10*3/MM3 (ref 3.4–10.8)

## 2022-07-29 PROCEDURE — 94761 N-INVAS EAR/PLS OXIMETRY MLT: CPT

## 2022-07-29 PROCEDURE — 80053 COMPREHEN METABOLIC PANEL: CPT | Performed by: STUDENT IN AN ORGANIZED HEALTH CARE EDUCATION/TRAINING PROGRAM

## 2022-07-29 PROCEDURE — 25010000002 METHYLPREDNISOLONE PER 40 MG: Performed by: STUDENT IN AN ORGANIZED HEALTH CARE EDUCATION/TRAINING PROGRAM

## 2022-07-29 PROCEDURE — 25010000002 FUROSEMIDE PER 20 MG: Performed by: STUDENT IN AN ORGANIZED HEALTH CARE EDUCATION/TRAINING PROGRAM

## 2022-07-29 PROCEDURE — 25010000002 METHYLPREDNISOLONE PER 40 MG: Performed by: INTERNAL MEDICINE

## 2022-07-29 PROCEDURE — 25010000002 ENOXAPARIN PER 10 MG: Performed by: STUDENT IN AN ORGANIZED HEALTH CARE EDUCATION/TRAINING PROGRAM

## 2022-07-29 PROCEDURE — 25010000002 FUROSEMIDE PER 20 MG: Performed by: INTERNAL MEDICINE

## 2022-07-29 PROCEDURE — 94660 CPAP INITIATION&MGMT: CPT

## 2022-07-29 PROCEDURE — 94799 UNLISTED PULMONARY SVC/PX: CPT

## 2022-07-29 PROCEDURE — 97166 OT EVAL MOD COMPLEX 45 MIN: CPT

## 2022-07-29 PROCEDURE — 99233 SBSQ HOSP IP/OBS HIGH 50: CPT | Performed by: STUDENT IN AN ORGANIZED HEALTH CARE EDUCATION/TRAINING PROGRAM

## 2022-07-29 PROCEDURE — 85025 COMPLETE CBC W/AUTO DIFF WBC: CPT | Performed by: STUDENT IN AN ORGANIZED HEALTH CARE EDUCATION/TRAINING PROGRAM

## 2022-07-29 PROCEDURE — 25010000002 CEFEPIME PER 500 MG: Performed by: STUDENT IN AN ORGANIZED HEALTH CARE EDUCATION/TRAINING PROGRAM

## 2022-07-29 PROCEDURE — 25010000002 REMDESIVIR 100 MG RECONSTITUTED SOLUTION: Performed by: STUDENT IN AN ORGANIZED HEALTH CARE EDUCATION/TRAINING PROGRAM

## 2022-07-29 RX ORDER — NYSTATIN 100000 [USP'U]/G
POWDER TOPICAL EVERY 12 HOURS SCHEDULED
Status: DISCONTINUED | OUTPATIENT
Start: 2022-07-29 | End: 2022-08-11 | Stop reason: HOSPADM

## 2022-07-29 RX ORDER — POTASSIUM CHLORIDE 1.5 G/1.77G
40 POWDER, FOR SOLUTION ORAL ONCE
Status: COMPLETED | OUTPATIENT
Start: 2022-07-29 | End: 2022-07-29

## 2022-07-29 RX ORDER — FUROSEMIDE 10 MG/ML
40 INJECTION INTRAMUSCULAR; INTRAVENOUS EVERY 12 HOURS
Status: DISCONTINUED | OUTPATIENT
Start: 2022-07-29 | End: 2022-08-02

## 2022-07-29 RX ADMIN — CEFEPIME HYDROCHLORIDE 2 G: 2 INJECTION, POWDER, FOR SOLUTION INTRAVENOUS at 12:28

## 2022-07-29 RX ADMIN — BUDESONIDE AND FORMOTEROL FUMARATE DIHYDRATE 2 PUFF: 160; 4.5 AEROSOL RESPIRATORY (INHALATION) at 07:07

## 2022-07-29 RX ADMIN — ENOXAPARIN SODIUM 40 MG: 40 INJECTION SUBCUTANEOUS at 21:51

## 2022-07-29 RX ADMIN — FUROSEMIDE 40 MG: 10 INJECTION, SOLUTION INTRAMUSCULAR; INTRAVENOUS at 21:53

## 2022-07-29 RX ADMIN — ALBUTEROL SULFATE 2 PUFF: 90 AEROSOL, METERED RESPIRATORY (INHALATION) at 13:55

## 2022-07-29 RX ADMIN — GUAIFENESIN 1200 MG: 600 TABLET, EXTENDED RELEASE ORAL at 21:51

## 2022-07-29 RX ADMIN — DOCUSATE SODIUM 50 MG AND SENNOSIDES 8.6 MG 2 TABLET: 8.6; 5 TABLET, FILM COATED ORAL at 08:31

## 2022-07-29 RX ADMIN — ASPIRIN 81 MG: 81 TABLET, CHEWABLE ORAL at 08:31

## 2022-07-29 RX ADMIN — PANTOPRAZOLE SODIUM 40 MG: 40 INJECTION, POWDER, FOR SOLUTION INTRAVENOUS at 05:56

## 2022-07-29 RX ADMIN — CLOPIDOGREL 75 MG: 75 TABLET, FILM COATED ORAL at 08:31

## 2022-07-29 RX ADMIN — GUAIFENESIN 1200 MG: 600 TABLET, EXTENDED RELEASE ORAL at 08:31

## 2022-07-29 RX ADMIN — ALBUTEROL SULFATE 2 PUFF: 90 AEROSOL, METERED RESPIRATORY (INHALATION) at 19:45

## 2022-07-29 RX ADMIN — DOXYCYCLINE 100 MG: 100 INJECTION, POWDER, LYOPHILIZED, FOR SOLUTION INTRAVENOUS at 08:30

## 2022-07-29 RX ADMIN — METHYLPREDNISOLONE SODIUM SUCCINATE 40 MG: 40 INJECTION, POWDER, FOR SOLUTION INTRAMUSCULAR; INTRAVENOUS at 21:52

## 2022-07-29 RX ADMIN — Medication 10 ML: at 21:52

## 2022-07-29 RX ADMIN — CETIRIZINE HYDROCHLORIDE 10 MG: 10 TABLET, FILM COATED ORAL at 08:31

## 2022-07-29 RX ADMIN — FUROSEMIDE 40 MG: 10 INJECTION, SOLUTION INTRAMUSCULAR; INTRAVENOUS at 12:28

## 2022-07-29 RX ADMIN — ATORVASTATIN CALCIUM 80 MG: 40 TABLET, FILM COATED ORAL at 21:50

## 2022-07-29 RX ADMIN — CEFEPIME HYDROCHLORIDE 2 G: 2 INJECTION, POWDER, FOR SOLUTION INTRAVENOUS at 21:50

## 2022-07-29 RX ADMIN — Medication 10 ML: at 08:32

## 2022-07-29 RX ADMIN — METOPROLOL TARTRATE 25 MG: 25 TABLET, FILM COATED ORAL at 08:31

## 2022-07-29 RX ADMIN — CEFEPIME HYDROCHLORIDE 2 G: 2 INJECTION, POWDER, FOR SOLUTION INTRAVENOUS at 03:02

## 2022-07-29 RX ADMIN — POTASSIUM CHLORIDE 40 MEQ: 1.5 POWDER, FOR SOLUTION ORAL at 12:29

## 2022-07-29 RX ADMIN — NYSTATIN: 100000 POWDER TOPICAL at 21:52

## 2022-07-29 RX ADMIN — ALBUTEROL SULFATE 2 PUFF: 90 AEROSOL, METERED RESPIRATORY (INHALATION) at 07:06

## 2022-07-29 RX ADMIN — LORAZEPAM 2 MG: 2 TABLET ORAL at 22:03

## 2022-07-29 RX ADMIN — METOPROLOL TARTRATE 25 MG: 25 TABLET, FILM COATED ORAL at 21:51

## 2022-07-29 RX ADMIN — REMDESIVIR 100 MG: 100 INJECTION, POWDER, LYOPHILIZED, FOR SOLUTION INTRAVENOUS at 08:29

## 2022-07-29 RX ADMIN — BUDESONIDE AND FORMOTEROL FUMARATE DIHYDRATE 2 PUFF: 160; 4.5 AEROSOL RESPIRATORY (INHALATION) at 19:45

## 2022-07-29 RX ADMIN — FLUTICASONE PROPIONATE 1 SPRAY: 50 SPRAY, METERED NASAL at 08:31

## 2022-07-29 RX ADMIN — Medication 10 ML: at 08:33

## 2022-07-29 RX ADMIN — DOXYCYCLINE 100 MG: 100 INJECTION, POWDER, LYOPHILIZED, FOR SOLUTION INTRAVENOUS at 22:03

## 2022-07-29 RX ADMIN — DOCUSATE SODIUM 50 MG AND SENNOSIDES 8.6 MG 2 TABLET: 8.6; 5 TABLET, FILM COATED ORAL at 21:52

## 2022-07-29 RX ADMIN — METHYLPREDNISOLONE SODIUM SUCCINATE 40 MG: 40 INJECTION, POWDER, FOR SOLUTION INTRAMUSCULAR; INTRAVENOUS at 12:28

## 2022-07-29 RX ADMIN — METHYLPREDNISOLONE SODIUM SUCCINATE 40 MG: 40 INJECTION, POWDER, FOR SOLUTION INTRAMUSCULAR; INTRAVENOUS at 03:02

## 2022-07-30 ENCOUNTER — APPOINTMENT (OUTPATIENT)
Dept: GENERAL RADIOLOGY | Facility: HOSPITAL | Age: 57
End: 2022-07-30

## 2022-07-30 LAB
ALBUMIN SERPL-MCNC: 3.59 G/DL (ref 3.5–5.2)
ALBUMIN/GLOB SERPL: 1.6 G/DL
ALP SERPL-CCNC: 38 U/L (ref 39–117)
ALT SERPL W P-5'-P-CCNC: 14 U/L (ref 1–41)
ANION GAP SERPL CALCULATED.3IONS-SCNC: 9.3 MMOL/L (ref 5–15)
AST SERPL-CCNC: 15 U/L (ref 1–40)
BASOPHILS # BLD AUTO: 0.01 10*3/MM3 (ref 0–0.2)
BASOPHILS NFR BLD AUTO: 0.1 % (ref 0–1.5)
BILIRUB SERPL-MCNC: 0.4 MG/DL (ref 0–1.2)
BUN SERPL-MCNC: 25 MG/DL (ref 6–20)
BUN/CREAT SERPL: 43.9 (ref 7–25)
CALCIUM SPEC-SCNC: 9.3 MG/DL (ref 8.6–10.5)
CHLORIDE SERPL-SCNC: 95 MMOL/L (ref 98–107)
CO2 SERPL-SCNC: 32.7 MMOL/L (ref 22–29)
CREAT SERPL-MCNC: 0.57 MG/DL (ref 0.76–1.27)
DEPRECATED RDW RBC AUTO: 43.2 FL (ref 37–54)
EGFRCR SERPLBLD CKD-EPI 2021: 115.8 ML/MIN/1.73
EOSINOPHIL # BLD AUTO: 0.03 10*3/MM3 (ref 0–0.4)
EOSINOPHIL NFR BLD AUTO: 0.2 % (ref 0.3–6.2)
ERYTHROCYTE [DISTWIDTH] IN BLOOD BY AUTOMATED COUNT: 12.5 % (ref 12.3–15.4)
GLOBULIN UR ELPH-MCNC: 2.3 GM/DL
GLUCOSE SERPL-MCNC: 112 MG/DL (ref 65–99)
HCT VFR BLD AUTO: 43.1 % (ref 37.5–51)
HGB BLD-MCNC: 14.2 G/DL (ref 13–17.7)
IMM GRANULOCYTES # BLD AUTO: 0.13 10*3/MM3 (ref 0–0.05)
IMM GRANULOCYTES NFR BLD AUTO: 1 % (ref 0–0.5)
LYMPHOCYTES # BLD AUTO: 1.2 10*3/MM3 (ref 0.7–3.1)
LYMPHOCYTES NFR BLD AUTO: 9.1 % (ref 19.6–45.3)
MCH RBC QN AUTO: 31.1 PG (ref 26.6–33)
MCHC RBC AUTO-ENTMCNC: 32.9 G/DL (ref 31.5–35.7)
MCV RBC AUTO: 94.3 FL (ref 79–97)
MONOCYTES # BLD AUTO: 1.28 10*3/MM3 (ref 0.1–0.9)
MONOCYTES NFR BLD AUTO: 9.7 % (ref 5–12)
NEUTROPHILS NFR BLD AUTO: 10.5 10*3/MM3 (ref 1.7–7)
NEUTROPHILS NFR BLD AUTO: 79.9 % (ref 42.7–76)
NRBC BLD AUTO-RTO: 0 /100 WBC (ref 0–0.2)
PLATELET # BLD AUTO: 305 10*3/MM3 (ref 140–450)
PMV BLD AUTO: 10.4 FL (ref 6–12)
POTASSIUM SERPL-SCNC: 4.4 MMOL/L (ref 3.5–5.2)
PROT SERPL-MCNC: 5.9 G/DL (ref 6–8.5)
RBC # BLD AUTO: 4.57 10*6/MM3 (ref 4.14–5.8)
SODIUM SERPL-SCNC: 137 MMOL/L (ref 136–145)
WBC NRBC COR # BLD: 13.15 10*3/MM3 (ref 3.4–10.8)

## 2022-07-30 PROCEDURE — 80053 COMPREHEN METABOLIC PANEL: CPT | Performed by: STUDENT IN AN ORGANIZED HEALTH CARE EDUCATION/TRAINING PROGRAM

## 2022-07-30 PROCEDURE — 94664 DEMO&/EVAL PT USE INHALER: CPT

## 2022-07-30 PROCEDURE — 25010000002 FUROSEMIDE PER 20 MG: Performed by: STUDENT IN AN ORGANIZED HEALTH CARE EDUCATION/TRAINING PROGRAM

## 2022-07-30 PROCEDURE — 94799 UNLISTED PULMONARY SVC/PX: CPT

## 2022-07-30 PROCEDURE — 25010000002 CEFTRIAXONE PER 250 MG: Performed by: STUDENT IN AN ORGANIZED HEALTH CARE EDUCATION/TRAINING PROGRAM

## 2022-07-30 PROCEDURE — 94761 N-INVAS EAR/PLS OXIMETRY MLT: CPT

## 2022-07-30 PROCEDURE — 85025 COMPLETE CBC W/AUTO DIFF WBC: CPT | Performed by: STUDENT IN AN ORGANIZED HEALTH CARE EDUCATION/TRAINING PROGRAM

## 2022-07-30 PROCEDURE — 25010000002 CEFEPIME PER 500 MG: Performed by: STUDENT IN AN ORGANIZED HEALTH CARE EDUCATION/TRAINING PROGRAM

## 2022-07-30 PROCEDURE — 25010000002 METHYLPREDNISOLONE PER 40 MG: Performed by: STUDENT IN AN ORGANIZED HEALTH CARE EDUCATION/TRAINING PROGRAM

## 2022-07-30 PROCEDURE — 25010000002 ENOXAPARIN PER 10 MG: Performed by: STUDENT IN AN ORGANIZED HEALTH CARE EDUCATION/TRAINING PROGRAM

## 2022-07-30 PROCEDURE — 99232 SBSQ HOSP IP/OBS MODERATE 35: CPT | Performed by: STUDENT IN AN ORGANIZED HEALTH CARE EDUCATION/TRAINING PROGRAM

## 2022-07-30 PROCEDURE — 71045 X-RAY EXAM CHEST 1 VIEW: CPT

## 2022-07-30 RX ORDER — METHYLPREDNISOLONE SODIUM SUCCINATE 40 MG/ML
40 INJECTION, POWDER, LYOPHILIZED, FOR SOLUTION INTRAMUSCULAR; INTRAVENOUS EVERY 12 HOURS
Status: COMPLETED | OUTPATIENT
Start: 2022-07-31 | End: 2022-08-01

## 2022-07-30 RX ADMIN — METHYLPREDNISOLONE SODIUM SUCCINATE 40 MG: 40 INJECTION, POWDER, FOR SOLUTION INTRAMUSCULAR; INTRAVENOUS at 03:00

## 2022-07-30 RX ADMIN — CEFEPIME HYDROCHLORIDE 2 G: 2 INJECTION, POWDER, FOR SOLUTION INTRAVENOUS at 03:01

## 2022-07-30 RX ADMIN — Medication 10 ML: at 09:36

## 2022-07-30 RX ADMIN — CEFEPIME HYDROCHLORIDE 2 G: 2 INJECTION, POWDER, FOR SOLUTION INTRAVENOUS at 09:34

## 2022-07-30 RX ADMIN — ALBUTEROL SULFATE 2 PUFF: 90 AEROSOL, METERED RESPIRATORY (INHALATION) at 18:32

## 2022-07-30 RX ADMIN — Medication 10 ML: at 20:53

## 2022-07-30 RX ADMIN — GUAIFENESIN 1200 MG: 600 TABLET, EXTENDED RELEASE ORAL at 09:34

## 2022-07-30 RX ADMIN — CETIRIZINE HYDROCHLORIDE 10 MG: 10 TABLET, FILM COATED ORAL at 09:34

## 2022-07-30 RX ADMIN — BUDESONIDE AND FORMOTEROL FUMARATE DIHYDRATE 2 PUFF: 160; 4.5 AEROSOL RESPIRATORY (INHALATION) at 06:40

## 2022-07-30 RX ADMIN — METHYLPREDNISOLONE SODIUM SUCCINATE 40 MG: 40 INJECTION, POWDER, FOR SOLUTION INTRAMUSCULAR; INTRAVENOUS at 23:22

## 2022-07-30 RX ADMIN — ASPIRIN 81 MG: 81 TABLET, CHEWABLE ORAL at 09:34

## 2022-07-30 RX ADMIN — ENOXAPARIN SODIUM 40 MG: 40 INJECTION SUBCUTANEOUS at 20:51

## 2022-07-30 RX ADMIN — DOXYCYCLINE 100 MG: 100 INJECTION, POWDER, LYOPHILIZED, FOR SOLUTION INTRAVENOUS at 09:35

## 2022-07-30 RX ADMIN — GUAIFENESIN 1200 MG: 600 TABLET, EXTENDED RELEASE ORAL at 20:52

## 2022-07-30 RX ADMIN — ATORVASTATIN CALCIUM 80 MG: 40 TABLET, FILM COATED ORAL at 20:52

## 2022-07-30 RX ADMIN — BUSPIRONE HYDROCHLORIDE 5 MG: 5 TABLET ORAL at 11:55

## 2022-07-30 RX ADMIN — DOCUSATE SODIUM 50 MG AND SENNOSIDES 8.6 MG 2 TABLET: 8.6; 5 TABLET, FILM COATED ORAL at 09:34

## 2022-07-30 RX ADMIN — CEFTRIAXONE 1 G: 1 INJECTION, POWDER, FOR SOLUTION INTRAMUSCULAR; INTRAVENOUS at 14:55

## 2022-07-30 RX ADMIN — BUDESONIDE AND FORMOTEROL FUMARATE DIHYDRATE 2 PUFF: 160; 4.5 AEROSOL RESPIRATORY (INHALATION) at 18:32

## 2022-07-30 RX ADMIN — FUROSEMIDE 40 MG: 10 INJECTION, SOLUTION INTRAMUSCULAR; INTRAVENOUS at 21:00

## 2022-07-30 RX ADMIN — ACETAMINOPHEN 325MG 650 MG: 325 TABLET ORAL at 18:43

## 2022-07-30 RX ADMIN — ALBUTEROL SULFATE 2 PUFF: 90 AEROSOL, METERED RESPIRATORY (INHALATION) at 03:01

## 2022-07-30 RX ADMIN — FUROSEMIDE 40 MG: 10 INJECTION, SOLUTION INTRAMUSCULAR; INTRAVENOUS at 09:33

## 2022-07-30 RX ADMIN — PANTOPRAZOLE SODIUM 40 MG: 40 INJECTION, POWDER, FOR SOLUTION INTRAVENOUS at 06:24

## 2022-07-30 RX ADMIN — ALBUTEROL SULFATE 2 PUFF: 90 AEROSOL, METERED RESPIRATORY (INHALATION) at 13:34

## 2022-07-30 RX ADMIN — NYSTATIN: 100000 POWDER TOPICAL at 09:36

## 2022-07-30 RX ADMIN — METHYLPREDNISOLONE SODIUM SUCCINATE 40 MG: 40 INJECTION, POWDER, FOR SOLUTION INTRAMUSCULAR; INTRAVENOUS at 12:01

## 2022-07-30 RX ADMIN — ALBUTEROL SULFATE 2 PUFF: 90 AEROSOL, METERED RESPIRATORY (INHALATION) at 06:40

## 2022-07-30 RX ADMIN — METOPROLOL TARTRATE 25 MG: 25 TABLET, FILM COATED ORAL at 20:52

## 2022-07-30 RX ADMIN — DOCUSATE SODIUM 50 MG AND SENNOSIDES 8.6 MG 2 TABLET: 8.6; 5 TABLET, FILM COATED ORAL at 21:01

## 2022-07-30 RX ADMIN — NYSTATIN: 100000 POWDER TOPICAL at 20:53

## 2022-07-30 RX ADMIN — CLOPIDOGREL 75 MG: 75 TABLET, FILM COATED ORAL at 09:34

## 2022-07-31 LAB
ALBUMIN SERPL-MCNC: 3.63 G/DL (ref 3.5–5.2)
ALBUMIN/GLOB SERPL: 1.8 G/DL
ALP SERPL-CCNC: 39 U/L (ref 39–117)
ALT SERPL W P-5'-P-CCNC: 13 U/L (ref 1–41)
ANION GAP SERPL CALCULATED.3IONS-SCNC: 8.3 MMOL/L (ref 5–15)
AST SERPL-CCNC: 16 U/L (ref 1–40)
BASOPHILS # BLD AUTO: 0.02 10*3/MM3 (ref 0–0.2)
BASOPHILS NFR BLD AUTO: 0.1 % (ref 0–1.5)
BILIRUB SERPL-MCNC: 0.5 MG/DL (ref 0–1.2)
BUN SERPL-MCNC: 26 MG/DL (ref 6–20)
BUN/CREAT SERPL: 55.3 (ref 7–25)
CALCIUM SPEC-SCNC: 9.2 MG/DL (ref 8.6–10.5)
CHLORIDE SERPL-SCNC: 91 MMOL/L (ref 98–107)
CO2 SERPL-SCNC: 33.7 MMOL/L (ref 22–29)
CREAT SERPL-MCNC: 0.47 MG/DL (ref 0.76–1.27)
DEPRECATED RDW RBC AUTO: 43.9 FL (ref 37–54)
EGFRCR SERPLBLD CKD-EPI 2021: 122.7 ML/MIN/1.73
EOSINOPHIL # BLD AUTO: 0.06 10*3/MM3 (ref 0–0.4)
EOSINOPHIL NFR BLD AUTO: 0.4 % (ref 0.3–6.2)
ERYTHROCYTE [DISTWIDTH] IN BLOOD BY AUTOMATED COUNT: 12.8 % (ref 12.3–15.4)
GLOBULIN UR ELPH-MCNC: 2.1 GM/DL
GLUCOSE SERPL-MCNC: 126 MG/DL (ref 65–99)
HCT VFR BLD AUTO: 43.5 % (ref 37.5–51)
HGB BLD-MCNC: 14.4 G/DL (ref 13–17.7)
IMM GRANULOCYTES # BLD AUTO: 0.16 10*3/MM3 (ref 0–0.05)
IMM GRANULOCYTES NFR BLD AUTO: 1 % (ref 0–0.5)
LYMPHOCYTES # BLD AUTO: 1.39 10*3/MM3 (ref 0.7–3.1)
LYMPHOCYTES NFR BLD AUTO: 9 % (ref 19.6–45.3)
MCH RBC QN AUTO: 31.2 PG (ref 26.6–33)
MCHC RBC AUTO-ENTMCNC: 33.1 G/DL (ref 31.5–35.7)
MCV RBC AUTO: 94.2 FL (ref 79–97)
MONOCYTES # BLD AUTO: 1.12 10*3/MM3 (ref 0.1–0.9)
MONOCYTES NFR BLD AUTO: 7.2 % (ref 5–12)
NEUTROPHILS NFR BLD AUTO: 12.7 10*3/MM3 (ref 1.7–7)
NEUTROPHILS NFR BLD AUTO: 82.3 % (ref 42.7–76)
NRBC BLD AUTO-RTO: 0 /100 WBC (ref 0–0.2)
PLATELET # BLD AUTO: 298 10*3/MM3 (ref 140–450)
PMV BLD AUTO: 10.6 FL (ref 6–12)
POTASSIUM SERPL-SCNC: 4.3 MMOL/L (ref 3.5–5.2)
PROT SERPL-MCNC: 5.7 G/DL (ref 6–8.5)
RBC # BLD AUTO: 4.62 10*6/MM3 (ref 4.14–5.8)
SODIUM SERPL-SCNC: 133 MMOL/L (ref 136–145)
WBC NRBC COR # BLD: 15.45 10*3/MM3 (ref 3.4–10.8)

## 2022-07-31 PROCEDURE — 80053 COMPREHEN METABOLIC PANEL: CPT | Performed by: STUDENT IN AN ORGANIZED HEALTH CARE EDUCATION/TRAINING PROGRAM

## 2022-07-31 PROCEDURE — 25010000002 ENOXAPARIN PER 10 MG: Performed by: STUDENT IN AN ORGANIZED HEALTH CARE EDUCATION/TRAINING PROGRAM

## 2022-07-31 PROCEDURE — 25010000002 CEFTRIAXONE PER 250 MG: Performed by: STUDENT IN AN ORGANIZED HEALTH CARE EDUCATION/TRAINING PROGRAM

## 2022-07-31 PROCEDURE — 25010000002 FUROSEMIDE PER 20 MG: Performed by: STUDENT IN AN ORGANIZED HEALTH CARE EDUCATION/TRAINING PROGRAM

## 2022-07-31 PROCEDURE — 94799 UNLISTED PULMONARY SVC/PX: CPT

## 2022-07-31 PROCEDURE — 94761 N-INVAS EAR/PLS OXIMETRY MLT: CPT

## 2022-07-31 PROCEDURE — 25010000002 METHYLPREDNISOLONE PER 40 MG: Performed by: STUDENT IN AN ORGANIZED HEALTH CARE EDUCATION/TRAINING PROGRAM

## 2022-07-31 PROCEDURE — 85025 COMPLETE CBC W/AUTO DIFF WBC: CPT | Performed by: STUDENT IN AN ORGANIZED HEALTH CARE EDUCATION/TRAINING PROGRAM

## 2022-07-31 PROCEDURE — 99232 SBSQ HOSP IP/OBS MODERATE 35: CPT | Performed by: STUDENT IN AN ORGANIZED HEALTH CARE EDUCATION/TRAINING PROGRAM

## 2022-07-31 RX ADMIN — FUROSEMIDE 40 MG: 10 INJECTION, SOLUTION INTRAMUSCULAR; INTRAVENOUS at 09:23

## 2022-07-31 RX ADMIN — METHYLPREDNISOLONE SODIUM SUCCINATE 40 MG: 40 INJECTION, POWDER, FOR SOLUTION INTRAMUSCULAR; INTRAVENOUS at 14:24

## 2022-07-31 RX ADMIN — ALBUTEROL SULFATE 2 PUFF: 90 AEROSOL, METERED RESPIRATORY (INHALATION) at 02:37

## 2022-07-31 RX ADMIN — Medication 10 ML: at 20:15

## 2022-07-31 RX ADMIN — ALBUTEROL SULFATE 2 PUFF: 90 AEROSOL, METERED RESPIRATORY (INHALATION) at 22:27

## 2022-07-31 RX ADMIN — ALBUTEROL SULFATE 2 PUFF: 90 AEROSOL, METERED RESPIRATORY (INHALATION) at 06:46

## 2022-07-31 RX ADMIN — BUDESONIDE AND FORMOTEROL FUMARATE DIHYDRATE 2 PUFF: 160; 4.5 AEROSOL RESPIRATORY (INHALATION) at 06:46

## 2022-07-31 RX ADMIN — CLOPIDOGREL 75 MG: 75 TABLET, FILM COATED ORAL at 09:23

## 2022-07-31 RX ADMIN — NICOTINE TRANSDERMAL SYSTEM 1 PATCH: 21 PATCH, EXTENDED RELEASE TRANSDERMAL at 13:25

## 2022-07-31 RX ADMIN — PANTOPRAZOLE SODIUM 40 MG: 40 INJECTION, POWDER, FOR SOLUTION INTRAVENOUS at 05:11

## 2022-07-31 RX ADMIN — METOPROLOL TARTRATE 25 MG: 25 TABLET, FILM COATED ORAL at 20:15

## 2022-07-31 RX ADMIN — BUDESONIDE AND FORMOTEROL FUMARATE DIHYDRATE 2 PUFF: 160; 4.5 AEROSOL RESPIRATORY (INHALATION) at 19:03

## 2022-07-31 RX ADMIN — ALBUTEROL SULFATE 2 PUFF: 90 AEROSOL, METERED RESPIRATORY (INHALATION) at 13:20

## 2022-07-31 RX ADMIN — NYSTATIN: 100000 POWDER TOPICAL at 20:16

## 2022-07-31 RX ADMIN — DOCUSATE SODIUM 50 MG AND SENNOSIDES 8.6 MG 2 TABLET: 8.6; 5 TABLET, FILM COATED ORAL at 09:24

## 2022-07-31 RX ADMIN — CETIRIZINE HYDROCHLORIDE 10 MG: 10 TABLET, FILM COATED ORAL at 09:24

## 2022-07-31 RX ADMIN — GUAIFENESIN 1200 MG: 600 TABLET, EXTENDED RELEASE ORAL at 20:14

## 2022-07-31 RX ADMIN — BUSPIRONE HYDROCHLORIDE 5 MG: 5 TABLET ORAL at 20:15

## 2022-07-31 RX ADMIN — Medication 10 ML: at 09:25

## 2022-07-31 RX ADMIN — DOCUSATE SODIUM 50 MG AND SENNOSIDES 8.6 MG 2 TABLET: 8.6; 5 TABLET, FILM COATED ORAL at 20:14

## 2022-07-31 RX ADMIN — GUAIFENESIN 1200 MG: 600 TABLET, EXTENDED RELEASE ORAL at 09:24

## 2022-07-31 RX ADMIN — ATORVASTATIN CALCIUM 80 MG: 40 TABLET, FILM COATED ORAL at 20:14

## 2022-07-31 RX ADMIN — FUROSEMIDE 40 MG: 10 INJECTION, SOLUTION INTRAMUSCULAR; INTRAVENOUS at 22:26

## 2022-07-31 RX ADMIN — METOPROLOL TARTRATE 25 MG: 25 TABLET, FILM COATED ORAL at 09:24

## 2022-07-31 RX ADMIN — ENOXAPARIN SODIUM 40 MG: 40 INJECTION SUBCUTANEOUS at 20:16

## 2022-07-31 RX ADMIN — NYSTATIN: 100000 POWDER TOPICAL at 09:25

## 2022-07-31 RX ADMIN — CEFTRIAXONE 1 G: 1 INJECTION, POWDER, FOR SOLUTION INTRAMUSCULAR; INTRAVENOUS at 13:16

## 2022-07-31 RX ADMIN — ASPIRIN 81 MG: 81 TABLET, CHEWABLE ORAL at 09:24

## 2022-07-31 RX ADMIN — FLUTICASONE PROPIONATE 1 SPRAY: 50 SPRAY, METERED NASAL at 09:24

## 2022-08-01 ENCOUNTER — APPOINTMENT (OUTPATIENT)
Dept: MRI IMAGING | Facility: HOSPITAL | Age: 57
End: 2022-08-01

## 2022-08-01 LAB
ABSOLUTE LUNG FLUID CONTENT: 17 % (ref 20–35)
ALBUMIN SERPL-MCNC: 3.56 G/DL (ref 3.5–5.2)
ALBUMIN/GLOB SERPL: 1.6 G/DL
ALP SERPL-CCNC: 48 U/L (ref 39–117)
ALT SERPL W P-5'-P-CCNC: 19 U/L (ref 1–41)
ANION GAP SERPL CALCULATED.3IONS-SCNC: 10.1 MMOL/L (ref 5–15)
AST SERPL-CCNC: 27 U/L (ref 1–40)
BASOPHILS # BLD AUTO: 0.01 10*3/MM3 (ref 0–0.2)
BASOPHILS NFR BLD AUTO: 0.1 % (ref 0–1.5)
BILIRUB SERPL-MCNC: 0.3 MG/DL (ref 0–1.2)
BUN SERPL-MCNC: 23 MG/DL (ref 6–20)
BUN/CREAT SERPL: 46.9 (ref 7–25)
CALCIUM SPEC-SCNC: 8.8 MG/DL (ref 8.6–10.5)
CHLORIDE SERPL-SCNC: 91 MMOL/L (ref 98–107)
CO2 SERPL-SCNC: 34.9 MMOL/L (ref 22–29)
CREAT SERPL-MCNC: 0.49 MG/DL (ref 0.76–1.27)
D DIMER PPP FEU-MCNC: 0.48 MCGFEU/ML (ref 0–0.5)
DEPRECATED RDW RBC AUTO: 43.8 FL (ref 37–54)
EGFRCR SERPLBLD CKD-EPI 2021: 121.2 ML/MIN/1.73
EOSINOPHIL # BLD AUTO: 0.07 10*3/MM3 (ref 0–0.4)
EOSINOPHIL NFR BLD AUTO: 0.5 % (ref 0.3–6.2)
ERYTHROCYTE [DISTWIDTH] IN BLOOD BY AUTOMATED COUNT: 12.7 % (ref 12.3–15.4)
GLOBULIN UR ELPH-MCNC: 2.2 GM/DL
GLUCOSE SERPL-MCNC: 130 MG/DL (ref 65–99)
HCT VFR BLD AUTO: 41.9 % (ref 37.5–51)
HGB BLD-MCNC: 13.9 G/DL (ref 13–17.7)
IMM GRANULOCYTES # BLD AUTO: 0.14 10*3/MM3 (ref 0–0.05)
IMM GRANULOCYTES NFR BLD AUTO: 1 % (ref 0–0.5)
LYMPHOCYTES # BLD AUTO: 2.31 10*3/MM3 (ref 0.7–3.1)
LYMPHOCYTES NFR BLD AUTO: 15.7 % (ref 19.6–45.3)
MCH RBC QN AUTO: 31.2 PG (ref 26.6–33)
MCHC RBC AUTO-ENTMCNC: 33.2 G/DL (ref 31.5–35.7)
MCV RBC AUTO: 93.9 FL (ref 79–97)
MONOCYTES # BLD AUTO: 1.57 10*3/MM3 (ref 0.1–0.9)
MONOCYTES NFR BLD AUTO: 10.7 % (ref 5–12)
NEUTROPHILS NFR BLD AUTO: 10.57 10*3/MM3 (ref 1.7–7)
NEUTROPHILS NFR BLD AUTO: 72 % (ref 42.7–76)
NRBC BLD AUTO-RTO: 0 /100 WBC (ref 0–0.2)
NT-PROBNP SERPL-MCNC: 39.9 PG/ML (ref 0–900)
PLATELET # BLD AUTO: 289 10*3/MM3 (ref 140–450)
PMV BLD AUTO: 10.6 FL (ref 6–12)
POTASSIUM SERPL-SCNC: 4.2 MMOL/L (ref 3.5–5.2)
PROT SERPL-MCNC: 5.8 G/DL (ref 6–8.5)
RBC # BLD AUTO: 4.46 10*6/MM3 (ref 4.14–5.8)
SODIUM SERPL-SCNC: 136 MMOL/L (ref 136–145)
WBC NRBC COR # BLD: 14.67 10*3/MM3 (ref 3.4–10.8)

## 2022-08-01 PROCEDURE — 94799 UNLISTED PULMONARY SVC/PX: CPT

## 2022-08-01 PROCEDURE — 25010000002 METHYLPREDNISOLONE PER 40 MG: Performed by: STUDENT IN AN ORGANIZED HEALTH CARE EDUCATION/TRAINING PROGRAM

## 2022-08-01 PROCEDURE — 25010000002 FUROSEMIDE PER 20 MG: Performed by: STUDENT IN AN ORGANIZED HEALTH CARE EDUCATION/TRAINING PROGRAM

## 2022-08-01 PROCEDURE — 97530 THERAPEUTIC ACTIVITIES: CPT

## 2022-08-01 PROCEDURE — 94761 N-INVAS EAR/PLS OXIMETRY MLT: CPT

## 2022-08-01 PROCEDURE — 85379 FIBRIN DEGRADATION QUANT: CPT | Performed by: INTERNAL MEDICINE

## 2022-08-01 PROCEDURE — 97110 THERAPEUTIC EXERCISES: CPT

## 2022-08-01 PROCEDURE — 94726 PLETHYSMOGRAPHY LUNG VOLUMES: CPT

## 2022-08-01 PROCEDURE — 63710000001 PREDNISONE PER 1 MG: Performed by: INTERNAL MEDICINE

## 2022-08-01 PROCEDURE — 25010000002 ENOXAPARIN PER 10 MG: Performed by: STUDENT IN AN ORGANIZED HEALTH CARE EDUCATION/TRAINING PROGRAM

## 2022-08-01 PROCEDURE — 80053 COMPREHEN METABOLIC PANEL: CPT | Performed by: STUDENT IN AN ORGANIZED HEALTH CARE EDUCATION/TRAINING PROGRAM

## 2022-08-01 PROCEDURE — 99232 SBSQ HOSP IP/OBS MODERATE 35: CPT | Performed by: INTERNAL MEDICINE

## 2022-08-01 PROCEDURE — 85025 COMPLETE CBC W/AUTO DIFF WBC: CPT | Performed by: STUDENT IN AN ORGANIZED HEALTH CARE EDUCATION/TRAINING PROGRAM

## 2022-08-01 PROCEDURE — 83880 ASSAY OF NATRIURETIC PEPTIDE: CPT | Performed by: INTERNAL MEDICINE

## 2022-08-01 RX ORDER — PANTOPRAZOLE SODIUM 40 MG/1
40 TABLET, DELAYED RELEASE ORAL
Status: DISCONTINUED | OUTPATIENT
Start: 2022-08-02 | End: 2022-08-11 | Stop reason: HOSPADM

## 2022-08-01 RX ORDER — IPRATROPIUM BROMIDE AND ALBUTEROL SULFATE 2.5; .5 MG/3ML; MG/3ML
3 SOLUTION RESPIRATORY (INHALATION)
Status: DISCONTINUED | OUTPATIENT
Start: 2022-08-01 | End: 2022-08-04

## 2022-08-01 RX ORDER — PREDNISONE 20 MG/1
40 TABLET ORAL
Status: DISCONTINUED | OUTPATIENT
Start: 2022-08-01 | End: 2022-08-04

## 2022-08-01 RX ADMIN — Medication 10 ML: at 12:47

## 2022-08-01 RX ADMIN — Medication 10 ML: at 09:02

## 2022-08-01 RX ADMIN — GUAIFENESIN 1200 MG: 600 TABLET, EXTENDED RELEASE ORAL at 20:32

## 2022-08-01 RX ADMIN — NYSTATIN: 100000 POWDER TOPICAL at 09:07

## 2022-08-01 RX ADMIN — PANTOPRAZOLE SODIUM 40 MG: 40 INJECTION, POWDER, FOR SOLUTION INTRAVENOUS at 05:27

## 2022-08-01 RX ADMIN — ASPIRIN 81 MG: 81 TABLET, CHEWABLE ORAL at 09:02

## 2022-08-01 RX ADMIN — PREDNISONE 40 MG: 20 TABLET ORAL at 16:12

## 2022-08-01 RX ADMIN — ATORVASTATIN CALCIUM 80 MG: 40 TABLET, FILM COATED ORAL at 20:31

## 2022-08-01 RX ADMIN — CLOPIDOGREL 75 MG: 75 TABLET, FILM COATED ORAL at 09:02

## 2022-08-01 RX ADMIN — ALBUTEROL SULFATE 2 PUFF: 90 AEROSOL, METERED RESPIRATORY (INHALATION) at 12:57

## 2022-08-01 RX ADMIN — DOCUSATE SODIUM 50 MG AND SENNOSIDES 8.6 MG 2 TABLET: 8.6; 5 TABLET, FILM COATED ORAL at 20:31

## 2022-08-01 RX ADMIN — NICOTINE TRANSDERMAL SYSTEM 1 PATCH: 21 PATCH, EXTENDED RELEASE TRANSDERMAL at 12:47

## 2022-08-01 RX ADMIN — Medication 10 ML: at 20:32

## 2022-08-01 RX ADMIN — CETIRIZINE HYDROCHLORIDE 10 MG: 10 TABLET, FILM COATED ORAL at 09:02

## 2022-08-01 RX ADMIN — METHYLPREDNISOLONE SODIUM SUCCINATE 40 MG: 40 INJECTION, POWDER, FOR SOLUTION INTRAMUSCULAR; INTRAVENOUS at 00:32

## 2022-08-01 RX ADMIN — DOCUSATE SODIUM 50 MG AND SENNOSIDES 8.6 MG 2 TABLET: 8.6; 5 TABLET, FILM COATED ORAL at 09:02

## 2022-08-01 RX ADMIN — METHYLPREDNISOLONE SODIUM SUCCINATE 40 MG: 40 INJECTION, POWDER, FOR SOLUTION INTRAMUSCULAR; INTRAVENOUS at 12:47

## 2022-08-01 RX ADMIN — METOPROLOL TARTRATE 25 MG: 25 TABLET, FILM COATED ORAL at 09:02

## 2022-08-01 RX ADMIN — ACETAMINOPHEN 325MG 650 MG: 325 TABLET ORAL at 18:56

## 2022-08-01 RX ADMIN — FUROSEMIDE 40 MG: 10 INJECTION, SOLUTION INTRAMUSCULAR; INTRAVENOUS at 22:00

## 2022-08-01 RX ADMIN — NYSTATIN: 100000 POWDER TOPICAL at 20:30

## 2022-08-01 RX ADMIN — GUAIFENESIN 1200 MG: 600 TABLET, EXTENDED RELEASE ORAL at 09:02

## 2022-08-01 RX ADMIN — FUROSEMIDE 40 MG: 10 INJECTION, SOLUTION INTRAMUSCULAR; INTRAVENOUS at 09:01

## 2022-08-01 RX ADMIN — FLUTICASONE PROPIONATE 1 SPRAY: 50 SPRAY, METERED NASAL at 09:07

## 2022-08-01 RX ADMIN — METOPROLOL TARTRATE 25 MG: 25 TABLET, FILM COATED ORAL at 20:31

## 2022-08-01 RX ADMIN — ALBUTEROL SULFATE 2 PUFF: 90 AEROSOL, METERED RESPIRATORY (INHALATION) at 18:49

## 2022-08-01 RX ADMIN — ENOXAPARIN SODIUM 40 MG: 40 INJECTION SUBCUTANEOUS at 20:31

## 2022-08-01 RX ADMIN — ALBUTEROL SULFATE 2 PUFF: 90 AEROSOL, METERED RESPIRATORY (INHALATION) at 06:33

## 2022-08-01 RX ADMIN — ALBUTEROL SULFATE 2 PUFF: 90 AEROSOL, METERED RESPIRATORY (INHALATION) at 00:54

## 2022-08-01 RX ADMIN — BUDESONIDE AND FORMOTEROL FUMARATE DIHYDRATE 2 PUFF: 160; 4.5 AEROSOL RESPIRATORY (INHALATION) at 06:33

## 2022-08-01 RX ADMIN — BUDESONIDE AND FORMOTEROL FUMARATE DIHYDRATE 2 PUFF: 160; 4.5 AEROSOL RESPIRATORY (INHALATION) at 18:49

## 2022-08-01 RX ADMIN — Medication 10 ML: at 20:30

## 2022-08-01 NOTE — NURSING NOTE
"Pt refusing to have MRI done states he is \"too claustrophobic to have it done.\" Provider made aware no new orders provided at this time.   "

## 2022-08-01 NOTE — NURSING NOTE
Rehab consult noted and we have no male beds and patient must be cleared out of covid isolation by IC in order to admit to rehab when beds come open.

## 2022-08-01 NOTE — THERAPY TREATMENT NOTE
Acute Care - Physical Therapy Treatment Note   [facility]     Patient Name: Emilio Guy  : 1965  MRN: [id number]  Today's Date: 2022   Onset of Illness/Injury or Date of Surgery: 22  Visit Dx:     ICD-10-CM ICD-9-CM   1. Acute respiratory failure with hypoxia and hypercapnia (HCC)  J96.01 518.81    J96.02    2. COVID-19  U07.1 079.89   3. Cerebrovascular accident (CVA), unspecified mechanism (HCC)  I63.9 434.91     Patient Active Problem List   Diagnosis   • COPD with acute exacerbation (HCC)   • Acute on chronic respiratory failure with hypoxia (HCC)   • Acute respiratory failure with hypoxia and hypercapnia (HCC)     Past Medical History:   Diagnosis Date   • CHF (congestive heart failure) (HCC)    • COPD (chronic obstructive pulmonary disease) (HCC)      No past surgical history on file.  PT Assessment (last 12 hours)     PT Evaluation and Treatment     Row Name 22          Physical Therapy Time and Intention    Subjective Information complains of;fatigue  -CS     Document Type therapy note (daily note)  -CS     Mode of Treatment physical therapy  -CS     Patient Effort adequate  -CS     Symptoms Noted During/After Treatment fatigue  -CS     Comment Pt seen bedside this AM and agreed to PT.  -CS     Row Name 22          General Information    Patient Profile Reviewed yes  -CS     Row Name 22          Bed Mobility    Bed Mobility bed mobility (all) activities  -CS     All Activities, [place] (Bed Mobility) minimum assist (75% patient effort);moderate assist (50% patient effort)  -CS     Assistive Device (Bed Mobility) bed rails;draw sheet;head of bed elevated  -CS     Row Name 22          Gait/Stairs (Locomotion)    Comment, (Gait/Stairs) Pt declined 2/2 fatigue/malaise this day.  -CS     Row Name 22          Motor Skills    Therapeutic Exercise --  Pt completed trunk flex x 5 sitting in bed, then completed LAQ x 10 each LE in sitting,  bridging x 10 supine.  -     Row Name 08/01/22 0831          Plan of Care Review    Plan of Care Reviewed With patient  -CS     Progress improving  -CS     Outcome Evaluation Pt w/ improved mobility at bedside but limited standing mobility.  -     Row Name 08/01/22 0831          Positioning and Restraints    Pre-Treatment Position in bed  -CS     Post Treatment Position bed  -CS     In Bed call light within reach;encouraged to call for assist;exit alarm on  -     Row Name 08/01/22 0831          Therapy Assessment/Plan (PT)    Rehab Potential (PT) fair, will monitor progress closely  -CS     Criteria for Skilled Interventions Met (PT) yes;meets criteria;skilled treatment is necessary  -     Therapy Frequency (PT) 3 times/wk  3-5x/week  -     Problem List (PT) problems related to;balance;mobility;strength  -     Activity Limitations Related to Problem List (PT) unable to ambulate safely;unable to transfer safely  -     Row Name 08/01/22 0831          Progress Summary (PT)    Progress Toward Functional Goals (PT) progress toward functional goals is fair;progress toward functional goals is gradual  -     Row Name 08/01/22 0831          Physical Therapy Goals    Bed Mobility Goal Selection (PT) bed mobility, PT goal 1  -     Transfer Goal Selection (PT) transfer, PT goal 1  -     Gait Training Goal Selection (PT) gait training, PT goal 1  -     Row Name 08/01/22 0831          Bed Mobility Goal 1 (PT)    Activity/Assistive Device (Bed Mobility Goal 1, PT) bed mobility activities, all  -CS     [place] Level/Cues Needed (Bed Mobility Goal 1, PT) standby assist  -CS     Time Frame (Bed Mobility Goal 1, PT) long term goal (LTG);by discharge  -     Row Name 08/01/22 0831          Transfer Goal 1 (PT)    Activity/Assistive Device (Transfer Goal 1, PT) transfers, all  -CS     [place] Level/Cues Needed (Transfer Goal 1, PT) standby assist  -CS     Time Frame (Transfer Goal 1, PT) long term goal  (LTG);by discharge  -     Row Name 08/01/22 0831          Gait Training Goal 1 (PT)    Activity/Assistive Device (Gait Training Goal 1, PT) gait (walking locomotion);assistive device use  -CS     [place] Level (Gait Training Goal 1, PT) standby assist  -CS     Distance (Gait Training Goal 1, PT) 50'  -CS     Time Frame (Gait Training Goal 1, PT) long term goal (LTG);by discharge  -CS           User Key  (r) = Recorded By, (t) = Taken By, (c) = Cosigned By    Initials Name Provider Type    CS Kaiser Morley, PT Physical Therapist                Physical Therapy Education                 Title: PT OT SLP Therapies (Done)     Topic: Physical Therapy (Done)     Point: Mobility training (Done)     Learning Progress Summary           Patient Acceptance, E, VU,DU by IP at 7/31/2022 1556      Show all documentation for this point (3)                 Point: Home exercise program (Done)     Learning Progress Summary           Patient Acceptance, E, VU,DU by IP at 7/31/2022 1556      Show all documentation for this point (3)                 Point: Body mechanics (Done)     Learning Progress Summary           Patient Acceptance, E, VU,DU by IP at 7/31/2022 1556      Show all documentation for this point (3)                 Point: Precautions (Done)     Learning Progress Summary           Patient Acceptance, E, VU,DU by IP at 7/31/2022 1556      Show all documentation for this point (3)                             User Key     Initials Effective Dates Name Provider Type Discipline    IP 02/21/22 -  Beatriz Lucio, RN Registered Nurse Nurse              PT Recommendation and Plan  Anticipated Discharge Disposition (PT): home with assist, inpatient rehabilitation facility  Planned Therapy Interventions (PT): balance training, bed mobility training, gait training, home exercise program, neuromuscular re-education, patient/family education, strengthening, transfer training  Therapy Frequency (PT): 3 times/wk  (3-5x/week)  Progress Summary (PT)  Progress Toward Functional Goals (PT): progress toward functional goals is fair, progress toward functional goals is gradual  Plan of Care Reviewed With: patient  Progress: improving  Outcome Evaluation: Pt w/ improved mobility at bedside but limited standing mobility.       Time Calculation:    PT Charges     Row Name 08/01/22 1409             Time Calculation    PT Received On 08/01/22  -      PT Goal Re-Cert Due Date 08/11/22  -            User Key  (r) = Recorded By, (t) = Taken By, (c) = Cosigned By    Initials Name Provider Type     Kaiser Morley, PT Physical Therapist              Therapy Charges for Today     Code Description Service Date Service Provider Modifiers Qty    [id number] HC PT THER PROC EA 15 MIN 8/1/2022 Kaiser Morley, PT GP 1    [id number]  PT THERAPEUTIC ACT EA 15 MIN 8/1/2022 Kaiser Morley, PT GP 1               Kaiser Morley PT  8/1/2022

## 2022-08-01 NOTE — PLAN OF CARE
Goal Outcome Evaluation:  Plan of Care Reviewed With: patient        Progress: improving  Outcome Evaluation: Pt resting quietly PT/OT notified of consult for possible rehab placement. VSS NSR on the monitor. ordered for telemetry transfer on O2 @ 5 L humidified NC which is baseline for him. Spoke with pallative care today. refused MRI r/t claustraphobia provider made aware. will conitue to monitor per provider orders.

## 2022-08-01 NOTE — CASE MANAGEMENT/SOCIAL WORK
Discharge Planning Assessment  [facility]     Patient Name: Emilio Guy  MRN: [id number]  Today's Date: 8/1/2022    Admit Date: 7/24/2022     Discharge Plan     Row Name 08/01/22 1020       Plan    Plan Pt admitted 7/24/22. Pt now has inpatient rehab consult. SS noted that inpatient rehab has no male beds available at this time and that pt will need to be out of covid isolation. SS following.              Continued Care and Services - Admitted Since 7/24/2022     Destination     Service Provider Request Status Selected Services Address Phone Fax Patient Preferred    [facility]  Pending - No Request Sent N/A [address] [facility] [place] [id number] [phone] --              GARY Austin

## 2022-08-01 NOTE — PROGRESS NOTES
"Palliative Care Daily Progress Note     S: Medical record reviewed, followed up with Primary RN Beatriz and Dr. Mayer regarding patient's condition. When Palliative care entered the room the patient was resting in bed quietly, he was alert and oriented times three. He originally told me he was not in pain, however when I was talking to his RN Beatriz if he had pain medicine as he was c/o constipation he then said he was in pain in his back and hips. He denied nausea, vomiting, anxiety and only shortness of breath with movement.      O:   Palliative Performance Scale Score:     /76 (BP Location: Left arm, Patient Position: Lying)   Pulse 71   Temp 97 °F (36.1 °C)   Resp 15   Ht 177.8 cm (70\")   Wt 71.6 kg (157 lb 13.6 oz)   SpO2 95%   BMI 22.65 kg/m²     Intake/Output Summary (Last 24 hours) at 8/1/2022 1643  Last data filed at 8/1/2022 1600  Gross per 24 hour   Intake 1044 ml   Output 3185 ml   Net -2141 ml       PE:  Pt is in COVID Isolation, was on 5lNC and did not appear to be in distress at this time.      Meds: Reviewed and changes noted.    Labs:   Results from last 7 days   Lab Units 08/01/22  0105   WBC 10*3/mm3 14.67*   HEMOGLOBIN g/dL 13.9   HEMATOCRIT % 41.9   PLATELETS 10*3/mm3 289     Results from last 7 days   Lab Units 08/01/22  0105   SODIUM mmol/L 136   POTASSIUM mmol/L 4.2   CHLORIDE mmol/L 91*   CO2 mmol/L 34.9*   BUN mg/dL 23*   CREATININE mg/dL 0.49*   GLUCOSE mg/dL 130*   CALCIUM mg/dL 8.8     Results from last 7 days   Lab Units 08/01/22  0105   SODIUM mmol/L 136   POTASSIUM mmol/L 4.2   CHLORIDE mmol/L 91*   CO2 mmol/L 34.9*   BUN mg/dL 23*   CREATININE mg/dL 0.49*   CALCIUM mg/dL 8.8   BILIRUBIN mg/dL 0.3   ALK PHOS U/L 48   ALT (SGPT) U/L 19   AST (SGOT) U/L 27   GLUCOSE mg/dL 130*     Imaging Results (Last 72 Hours)     Procedure Component Value Units Date/Time    XR Chest 1 View [[id number]] Collected: 07/30/22 0902     Updated: 07/30/22 0905    Narrative:      CR Chest 1 " Vw    INDICATION:   Respiratory failure     COMPARISON:    July 28, 2022    FINDINGS:  Portable AP view(s) of the chest.        The heart and mediastinal contours are normal. The lungs are clear. No pneumothorax or pleural effusion.       Impression:      No acute cardiopulmonary findings.    Signer Name: CHELLY GRIFFITH MD   Signed: 7/30/2022 9:02 AM   Workstation Name: [id number]    [facility]            Diagnostics: Reviewed    A: Emilio Guy is a 56 y.o. male admitted on 7/24/2022 with Altered mental status. He has a past medical history of heart failure , COPD with ongoing tobacco use, alcohol use, essential hypertension. His mother Barbie with whom he lives called EMS. Per family pt has not taken care of himself for sometime nor does he have any desire to do so.I was also able to ask Emilio himself what he would want, while he was drowsy, he was able to answer me when I asked if he would want to be resuscitated or to be put on a vent he nodded no. I changed code status in computer, RN Maile is aware as is Dr. Darden.      P:  I was able to speak with patients mother Barbie to update her and provide support.  Barbie stated that she tried to get a hold of Emilio today through PCU but no one ever answered. I let her know that I would speak with Emilio's RN to see if they could connect her with Emilio. Beatriz stated that she had just spoken with barbie 15 minutes ago.    We will continue to follow along. Please do not hesitate to contact us regarding further sx mgmt or GOC needs, including after hours or on weekends via our on call provider at [phone].     Geneva López, APRN    8/1/2022

## 2022-08-01 NOTE — PROGRESS NOTES
[facility] HOSPITALIST PROGRESS NOTE     Patient Identification:  Name:  Emilio Guy  Age:  56 y.o.  Sex:  male  :  1965  MRN:  [id number]  Visit Number:  [id number]  ROOM: P210/S2     Primary Care Provider:  Valarie Garcia APRN    Length of stay in inpatient status:  7    Subjective     Chief Compliant:    Chief Complaint   Patient presents with   • Altered Mental Status     History of Presenting Illness:    Patient remains ill today, no acute events overnight, no new complaints, denies any fevers or chills, endorses persisting dyspnea and wheezing though, on IV steroids and have continued, has completed antibiotics and Remdesivir to this point, Infectious Disease previously not consulted and will continue to defer for now, Pulmonary previously following, updated recommendations pending, TeleNeurology following peripherally for subacute Cerebrovascular Accident, awaiting MRI though reportedly patient has refused this AM, on IV lasix from previous, not on at home, creatinine stable, repeating proBNP and RedsVest today, Palliative Care consulted and following for goals of care conversations, patient endorses persisting confusion though does feel improved some today. Social Work consulted and following for possible inpatient rehab placement.   Objective     Current Hospital Meds:  albuterol sulfate HFA, 2 puff, Inhalation, 4x Daily - RT  aspirin, 81 mg, Oral, Daily  atorvastatin, 80 mg, Oral, Nightly  budesonide-formoterol, 2 puff, Inhalation, BID - RT  cetirizine, 10 mg, Oral, Daily  clopidogrel, 75 mg, Oral, Daily  enoxaparin, 40 mg, Subcutaneous, Nightly  fluticasone, 1 spray, Nasal, Daily  furosemide, 40 mg, Intravenous, Q12H  guaiFENesin, 1,200 mg, Oral, BID  ipratropium-albuterol, 3 mL, Nebulization, 4x Daily - RT  methylPREDNISolone sodium succinate, 40 mg, Intravenous, Q12H  metoprolol tartrate, 25 mg, Oral, Q12H  nicotine, 1 patch, Transdermal, Q24H  nystatin, , Topical,  Q12H  pantoprazole, 40 mg, Intravenous, Q AM  senna-docusate sodium, 2 tablet, Oral, BID  sodium chloride, 10 mL, Intravenous, Q12H  sodium chloride, 10 mL, Intravenous, Q12H    ----------------------------------------------------------------------------------------------------------------------  Vital Signs:  Temp:  [96.5 °F (35.8 °C)-97.4 °F (36.3 °C)] 97.1 °F (36.2 °C)  Heart Rate:  [59-98] 84  Resp:  [14-28] 25  BP: ()/(55-88) 110/70  SpO2:  [93 %-100 %] 95 %  on  Flow (L/min):  [5] 5;   Device (Oxygen Therapy): humidified;nasal cannula  Body mass index is 22.65 kg/m².      Intake/Output Summary (Last 24 hours) at 8/1/2022 1051  Last data filed at 8/1/2022 0958  Gross per 24 hour   Intake 1134 ml   Output 3175 ml   Net -2041 ml      ----------------------------------------------------------------------------------------------------------------------  Physical exam:  Constitutional: older than stated age, No acute distress.      HENT:  Head:  Normocephalic and atraumatic.  Mouth:  Moist mucous membranes.    Eyes:  Conjunctivae and EOM are normal. No scleral icterus.    Neck:  Neck supple.  No JVD present.    Cardiovascular:  Normal rate, regular rhythm and normal heart sounds with no murmur.  Pulmonary/Chest:  Chronic respiratory distress, bilateral wheezes, no crackles, on 5LNC  Abdominal:  Soft. No distension and no tenderness.   Musculoskeletal:  No tenderness, and no deformity.  No red or swollen joints anywhere.    Neurological:  Alert and oriented to person, place, and time.  No cranial nerve deficit.    Skin:  Skin is warm and dry. No rash noted. No pallor.   Peripheral vascular:  No clubbing, no cyanosis, no edema.  Psychiatric: Appropriate mood and affect    Edited by: Jose M Mayer MD at 8/1/2022 1030  ----------------------------------------------------------------------------------------------------------------------  CBC - WBC/Hgb/Hct/Plts: 14.67*/13.9/41.9/289 (08/01 0105)  CHEM -  BUN/Cr/glu/ALT/AST/amyl/lip:23*/0.49*/130*/19/27/--/-- (08/01 0105)  LYTES - Na/K/Cl/CO2: 136/4.2/91*/34.9* (08/01 0105)     No results found for: URINECX  No results found for: BLOODCX    I have personally looked at the labs and they are summarized above.  ----------------------------------------------------------------------------------------------------------------------  Detailed radiology reports for the last 24 hours:  No radiology results for the last day  Assessment & Plan    56M PMH chronic respiratory failure on 3 L nasal cannula at home due to COPD who presented with worsening shortness of breath and diagnosed with an acute exacerbation of COPD.     #Sepsis (Not POA), Acute Metabolic Encephalopathy (POA), Acute Hypoxic/Hypercarbic on Chronic Hypoxic Respiratory Failure requiring Non-invasive Positive Pressure Ventilation due Pneumonia, Bacterial, treating for Community Acquired in setting of COPD/Emphysema with Acute Exacerbation, though also noted new Coronavirus 19 + and unknown clinical significance though treated with antiviral medication  #Pulmonary Adenopathy, largest 1.4cm  - Patient presented with increased 02 requirement from home, was on 3L but required Bipap now on 5LNC, did not initially meet sepsis criteria but later with worsening WBC count and heart rate > 90, notably with increased dyspnea, work of breathing, wheezing, imaging concern for bacterial Pneumonia though also with new Coronavirus 19 diagnosis. Patient also known to prior MD and suspected home trilogy nonadherence contributing to illness.   - Admission labs showed 10K->12K, 2.09, lactate 0.7, procalcitonin 0.06, d-dimer 1.21, covid +, strep pneumo and legionella negative, blood cultures negative to date, ABG pH down to 7.2, PC02 up to 91, P02 down to 46.   - CTPE showed no Pulmonary Embolism, emphysema with chronic changes bilateral lungs, mild Pneumonia RLL and minimal patchy infiltrate RUL as well, adenopathy suspected  reactive, recommended 3m follow up chest CT  - Infectious Disease not consulted per prior MD  - Pulmonary consulted and following, updated recommendations pending  - Palliative Care consulted and following for goals of care conversations  - Have ordered bilateral Venous Duplex to rule out DVT given prior elevated d-dimer, repeat level, prior CTPE negative though  - Has completed 7 days Ceftriaxone/Cefepime and Doxycycline, has completed 5 days Remdesivir, monitor for clinical worsening  - Dexamethasone deferred previously in favor of IV Methylprednisolone 40mg twice daily   - Continue Level I anticoagulation for Coronavirus 19 thromboembolism prophylaxis; repeat D-dimer today  - Continue Tylenol as needed for fevers  - Continue home Symbicort, Cetirizine, new Albuterol Inhaler, added scheduled duonebs today  - Trend Coronavirus 19 progression labs with CBC, CMP, CRP, D-dimer daily   - Monitor in PCU, enhanced droplet/contact isolation precautions, continue 02 but wean as able      #Subacute MCA stroke in setting of Chronic appearing left cerebellar infarction  - Imaging abnormalities noted on CT, TeleNeurology consulted with evaluation 7/26, felt likely watershed secondary to significant supraclinoid ICA stenosis. Risk factors include hypertension, tobacco abuse, alcohol abuse  - Continue Aspirin 81, statin, plavix   - follow up MRI if able to obtain and let TeleNeurology know when resulted  - PT/OT following, monitor on telemetry    #Hypertension  #Chronic HFpEF  - Labs showed troponins < 0.010, proBNP 710  - EKG showed normal sinus rhythm, nonspecific ST changes  - Echocardiogram showed LVEF 66-70%, diastolic dysfunction  - Continue home Aspirin 81, Statin  - Continue home Beta Blocker, titrate as indicated  - No home ACEI/ARB/ARNI, add as indicated  - Continue new IV Lasix as started per Pulmonary previously, monitor creatinine and urine output, repeating proBNP and check RedsVest today  - Continue to monitor on  telemetry, strict I/O's, trend heart rate and blood pressure      #Chronic alcohol use  - Drinks 3-4 beers per day.  No history of withdrawal. continue CIWA.    #Gastroesophageal Reflux Disease  - Continue home PPI    #Anxiety/Depression  - Continue home regimen    #Constipation  - Continue Doc-Senna, Miralax     #Tobacco Dependence  - Recommended cessation, nicotine replacement therapy as needed    #Medical Non-Adherence  - Given patient's choices and actions to not adhere to medical treatments and recommendations, this will drastically affect their short and long term morbidity and mortality.      F: Oral  E: Monitor & Replace PRN  N: Diet Soft Texture; Chopped   PPx: Prophylactic Lovenox (Level I)  Code Status (Patient has no pulse and is not breathing): No CPR (Do Not Attempt to Resuscitate)  Medical Interventions (Patient has pulse or is breathing): Limited Support  Medical Intervention Limits: NO intubation (DNI)    Dispo: Pending workup and clinical improvement, PT/OT consulted and following, Social Work to assist with inpatient rehab placement, though will need to be out of Coronavirus isolation reportedly.      *This patient is considered high risk due to sepsis, Acute Metabolic Encephalopathy, acute respiratory failure, Coronavirus 19, monitored for drug toxicities on Remdesivir.      Edited by: Jose M Mayer MD at 8/1/2022 1051  [facility] Hospitalist

## 2022-08-01 NOTE — PLAN OF CARE
Problem: Adult Inpatient Plan of Care  Goal: Plan of Care Review  Outcome: Ongoing, Progressing  Flowsheets  Taken 7/31/2022 2203 by Rose Fernandez RN  Plan of Care Reviewed With: patient  Taken 7/31/2022 1552 by Beatriz Lucio RN  Progress: improving  Goal: Patient-Specific Goal (Individualized)  Outcome: Ongoing, Progressing  Goal: Absence of Hospital-Acquired Illness or Injury  Outcome: Ongoing, Progressing  Goal: Optimal Comfort and Wellbeing  Outcome: Ongoing, Progressing  Goal: Readiness for Transition of Care  Outcome: Ongoing, Progressing     Problem: COPD (Chronic Obstructive Pulmonary Disease) Comorbidity  Goal: Maintenance of COPD Symptom Control  Outcome: Ongoing, Progressing     Problem: Skin Injury Risk Increased  Goal: Skin Health and Integrity  Outcome: Ongoing, Progressing     Problem: Gas Exchange Impaired  Goal: Optimal Gas Exchange  Outcome: Ongoing, Progressing     Problem: Thought Process Alteration  Goal: Optimal Thought Clarity  Outcome: Ongoing, Progressing     Problem: Fall Injury Risk  Goal: Absence of Fall and Fall-Related Injury  Outcome: Ongoing, Progressing   Goal Outcome Evaluation:  Plan of Care Reviewed With: patient   CIWA prn, oxygen via NC, monitoring VS, isolation.

## 2022-08-01 NOTE — PROGRESS NOTES
"                                              LOS: 7 days   Patient Care Team:  Valarie Garcia APRN as PCP - General (Family Medicine)    Chief Complaint:  F/up COPD, chronic respiratory failure    Subjective   Interval History  I reviewed the prior records.      Patient is currently on 5 L/min.  He did not use the Pap last night.  He reported improved breathing.  He continues to have cough, productive of thick whitish phlegm with difficulty expectorating.    ABG 7/28/2022: 7.49/48/46 on bilevel 40%    REVIEW OF SYSTEMS:   CARDIOVASCULAR: No chest pain, chest pressure or chest discomfort. No palpitations or edema.    GASTROINTESTINAL: No anorexia, nausea, vomiting or diarrhea. No abdominal pain.  CONSTITUTIONAL: No fever or chills.     Ventilator/Non-Invasive Ventilation Settings (From admission, onward)             Start     Ordered    07/29/22 1057  NIPPV (CPAP or BIPAP)  At Bedtime & As Needed-RT        Comments: 40%   Question Answer Comment   Type: BIPAP    IPAP 20    EPAP 8    Breath Rate 20        07/29/22 1056    07/25/22 0823  NIPPV (CPAP or BIPAP)  Until Discontinued,   Status:  Canceled        Comments: 40%   Question Answer Comment   Type: BIPAP    IPAP 24    EPAP 8    Breath Rate 28        07/25/22 0822    07/24/22 2254  NIPPV (CPAP or BIPAP)  Until Discontinued,   Status:  Canceled        Comments: Resp to manage   Question:  Type:  Answer:  BIPAP    07/24/22 2253                      Physical Exam:     Vital Signs  Temp:  [96.5 °F (35.8 °C)-97.4 °F (36.3 °C)] 97.1 °F (36.2 °C)  Heart Rate:  [59-98] 84  Resp:  [14-28] 25  BP: ()/(55-88) 110/70    Intake/Output Summary (Last 24 hours) at 8/1/2022 1133  Last data filed at 8/1/2022 1058  Gross per 24 hour   Intake 1134 ml   Output 3385 ml   Net -2251 ml     Flowsheet Rows    Flowsheet Row First Filed Value   Admission Height 177.8 cm (70\") Documented at 07/24/2022 2140   Admission Weight 68 kg (150 lb) Documented at 07/24/2022 2140    "       PPE used per hospital policy    General Appearance:   Alert, cooperative, in no acute distress   ENMT:  Mallampati score 3, moist mucous membrane   Eyes:  Pupils equal and reactive to light. EOMI   Neck:   Large. Trachea midline. No thyromegaly.   Lungs:    Equal but significantly diminished air entry bilaterally.  Minimal expiratory wheezing.  No crackles.  No labored breathing    Heart:   Regular rhythm and normal rate, normal S1 and S2, no         murmur   Skin:   No rash or ecchymosis   Abdomen:     Soft. No tenderness. No HSM.   Neuro/psych:  Conscious, alert, oriented x3. Strength 5/5 in upper and lower  ext.  Appropriate mood and affect   Extremities:  No cyanosis, clubbing or edema.  Warm extremities and well-perfused          Results Review:        Results from last 7 days   Lab Units 08/01/22  0105 07/31/22  0258 07/30/22  0047   SODIUM mmol/L 136 133* 137   POTASSIUM mmol/L 4.2 4.3 4.4   CHLORIDE mmol/L 91* 91* 95*   CO2 mmol/L 34.9* 33.7* 32.7*   BUN mg/dL 23* 26* 25*   CREATININE mg/dL 0.49* 0.47* 0.57*   GLUCOSE mg/dL 130* 126* 112*   CALCIUM mg/dL 8.8 9.2 9.3     Results from last 7 days   Lab Units 07/26/22  1617   TROPONIN T ng/mL <0.010     Results from last 7 days   Lab Units 08/01/22  0105 07/31/22  0259 07/30/22  0047   WBC 10*3/mm3 14.67* 15.45* 13.15*   HEMOGLOBIN g/dL 13.9 14.4 14.2   HEMATOCRIT % 41.9 43.5 43.1   PLATELETS 10*3/mm3 289 298 305               Results from last 7 days   Lab Units 08/01/22  1050 07/25/22  1850   D DIMER QUANT MCGFEU/mL 0.48 1.21*       Results from last 7 days   Lab Units 07/26/22  0035   CRP mg/dL 2.09*       Results from last 7 days   Lab Units 07/28/22  0652 07/26/22  0325 07/25/22  2357 07/25/22  1407   PH, ARTERIAL pH units 7.497* 7.292* 7.261* 7.330*   PCO2, ARTERIAL mm Hg 48.2* 83.4* 91.0* 75.4*   PO2 ART mm Hg 46.3* 77.3* 69.8* 79.0*   O2 SATURATION ART % 87.0* 95.2 93.3* 96.0   MODALITY  BiPap BiPap BiPap BiPap         I reviewed the patient's new  clinical results.        Medication Review:   albuterol sulfate HFA, 2 puff, Inhalation, 4x Daily - RT  aspirin, 81 mg, Oral, Daily  atorvastatin, 80 mg, Oral, Nightly  budesonide-formoterol, 2 puff, Inhalation, BID - RT  cetirizine, 10 mg, Oral, Daily  clopidogrel, 75 mg, Oral, Daily  enoxaparin, 40 mg, Subcutaneous, Nightly  fluticasone, 1 spray, Nasal, Daily  furosemide, 40 mg, Intravenous, Q12H  guaiFENesin, 1,200 mg, Oral, BID  ipratropium-albuterol, 3 mL, Nebulization, 4x Daily - RT  methylPREDNISolone sodium succinate, 40 mg, Intravenous, Q12H  metoprolol tartrate, 25 mg, Oral, Q12H  nicotine, 1 patch, Transdermal, Q24H  nystatin, , Topical, Q12H  [START ON 8/2/2022] pantoprazole, 40 mg, Oral, QAM AC  senna-docusate sodium, 2 tablet, Oral, BID  sodium chloride, 10 mL, Intravenous, Q12H  sodium chloride, 10 mL, Intravenous, Q12H             Diagnostic imaging:  I personally and independently reviewed the following images:  CXR 7/30/2022 and CT chest 7/24/2022: Trace pleural effusion bilaterally on the CXR with emphysematous changes.  CT showed emphysematous changes and small patchy opacity in the right upper lobe anteriorly in addition to elevated right hemidiaphragm and right lower lobe atelectasis.  Compared to 5/10/2022 CT chest, there was an mild bandlike scarring/atelectasis in the right upper lobe anteriorly in the same location where likely pulmonary opacity described on the latest CT.      Assessment     1. COPD exacerbation  2. COVID-19 URI  3. Acute on chronic hypoxic and hypercapnic respiratory failure, secondary to above, treated with NIPPV, on oxygen 5 L/minute baseline  4. Pulmonary opacities: Mostly atelectasis/scarring  5. Trace pleural effusion bilaterally.    All problems new to me    Plan       · Change steroid therapy from Solu-Medrol to prednisone 40 mg daily  · Symbicort 2 puffs twice daily  · DuoNeb 4 times a day  · Nicotine patch  · Oxygen by NC and titrate to keep SPO2 89-92%.  Avoid  hyperoxia in the setting of hypercarbia  · NIPPV to be used as needed only      Sari Loja MD  08/01/22  11:33 EDT            This note was dictated utilizing Dragon dictation

## 2022-08-02 ENCOUNTER — APPOINTMENT (OUTPATIENT)
Dept: ULTRASOUND IMAGING | Facility: HOSPITAL | Age: 57
End: 2022-08-02

## 2022-08-02 LAB
ALBUMIN SERPL-MCNC: 3.71 G/DL (ref 3.5–5.2)
ALBUMIN/GLOB SERPL: 1.9 G/DL
ALP SERPL-CCNC: 45 U/L (ref 39–117)
ALT SERPL W P-5'-P-CCNC: 21 U/L (ref 1–41)
ANION GAP SERPL CALCULATED.3IONS-SCNC: 8.6 MMOL/L (ref 5–15)
AST SERPL-CCNC: 21 U/L (ref 1–40)
BASOPHILS # BLD AUTO: 0.01 10*3/MM3 (ref 0–0.2)
BASOPHILS NFR BLD AUTO: 0.1 % (ref 0–1.5)
BILIRUB SERPL-MCNC: 0.3 MG/DL (ref 0–1.2)
BUN SERPL-MCNC: 29 MG/DL (ref 6–20)
BUN/CREAT SERPL: 46.8 (ref 7–25)
CALCIUM SPEC-SCNC: 9 MG/DL (ref 8.6–10.5)
CHLORIDE SERPL-SCNC: 90 MMOL/L (ref 98–107)
CO2 SERPL-SCNC: 36.4 MMOL/L (ref 22–29)
CREAT SERPL-MCNC: 0.62 MG/DL (ref 0.76–1.27)
DEPRECATED RDW RBC AUTO: 43.8 FL (ref 37–54)
EGFRCR SERPLBLD CKD-EPI 2021: 112.9 ML/MIN/1.73
EOSINOPHIL # BLD AUTO: 0 10*3/MM3 (ref 0–0.4)
EOSINOPHIL NFR BLD AUTO: 0 % (ref 0.3–6.2)
ERYTHROCYTE [DISTWIDTH] IN BLOOD BY AUTOMATED COUNT: 12.6 % (ref 12.3–15.4)
GLOBULIN UR ELPH-MCNC: 2 GM/DL
GLUCOSE SERPL-MCNC: 143 MG/DL (ref 65–99)
HCT VFR BLD AUTO: 41.3 % (ref 37.5–51)
HGB BLD-MCNC: 13.4 G/DL (ref 13–17.7)
IMM GRANULOCYTES # BLD AUTO: 0.13 10*3/MM3 (ref 0–0.05)
IMM GRANULOCYTES NFR BLD AUTO: 0.9 % (ref 0–0.5)
LYMPHOCYTES # BLD AUTO: 1.66 10*3/MM3 (ref 0.7–3.1)
LYMPHOCYTES NFR BLD AUTO: 11.6 % (ref 19.6–45.3)
MCH RBC QN AUTO: 30.5 PG (ref 26.6–33)
MCHC RBC AUTO-ENTMCNC: 32.4 G/DL (ref 31.5–35.7)
MCV RBC AUTO: 94.1 FL (ref 79–97)
MONOCYTES # BLD AUTO: 1.18 10*3/MM3 (ref 0.1–0.9)
MONOCYTES NFR BLD AUTO: 8.2 % (ref 5–12)
NEUTROPHILS NFR BLD AUTO: 11.34 10*3/MM3 (ref 1.7–7)
NEUTROPHILS NFR BLD AUTO: 79.2 % (ref 42.7–76)
NRBC BLD AUTO-RTO: 0 /100 WBC (ref 0–0.2)
PLATELET # BLD AUTO: 268 10*3/MM3 (ref 140–450)
PMV BLD AUTO: 10.8 FL (ref 6–12)
POTASSIUM SERPL-SCNC: 4.3 MMOL/L (ref 3.5–5.2)
PROT SERPL-MCNC: 5.7 G/DL (ref 6–8.5)
RBC # BLD AUTO: 4.39 10*6/MM3 (ref 4.14–5.8)
SODIUM SERPL-SCNC: 135 MMOL/L (ref 136–145)
WBC NRBC COR # BLD: 14.32 10*3/MM3 (ref 3.4–10.8)

## 2022-08-02 PROCEDURE — 94799 UNLISTED PULMONARY SVC/PX: CPT

## 2022-08-02 PROCEDURE — 85025 COMPLETE CBC W/AUTO DIFF WBC: CPT | Performed by: STUDENT IN AN ORGANIZED HEALTH CARE EDUCATION/TRAINING PROGRAM

## 2022-08-02 PROCEDURE — 93970 EXTREMITY STUDY: CPT

## 2022-08-02 PROCEDURE — 63710000001 PREDNISONE PER 1 MG: Performed by: INTERNAL MEDICINE

## 2022-08-02 PROCEDURE — 94761 N-INVAS EAR/PLS OXIMETRY MLT: CPT

## 2022-08-02 PROCEDURE — 99233 SBSQ HOSP IP/OBS HIGH 50: CPT | Performed by: PSYCHIATRY & NEUROLOGY

## 2022-08-02 PROCEDURE — 25010000002 ENOXAPARIN PER 10 MG: Performed by: STUDENT IN AN ORGANIZED HEALTH CARE EDUCATION/TRAINING PROGRAM

## 2022-08-02 PROCEDURE — 93970 EXTREMITY STUDY: CPT | Performed by: RADIOLOGY

## 2022-08-02 PROCEDURE — 80053 COMPREHEN METABOLIC PANEL: CPT | Performed by: STUDENT IN AN ORGANIZED HEALTH CARE EDUCATION/TRAINING PROGRAM

## 2022-08-02 PROCEDURE — 99232 SBSQ HOSP IP/OBS MODERATE 35: CPT | Performed by: INTERNAL MEDICINE

## 2022-08-02 PROCEDURE — 94660 CPAP INITIATION&MGMT: CPT

## 2022-08-02 RX ORDER — FUROSEMIDE 40 MG/1
40 TABLET ORAL DAILY
Status: DISCONTINUED | OUTPATIENT
Start: 2022-08-02 | End: 2022-08-03

## 2022-08-02 RX ADMIN — GUAIFENESIN 1200 MG: 600 TABLET, EXTENDED RELEASE ORAL at 20:38

## 2022-08-02 RX ADMIN — METOPROLOL TARTRATE 25 MG: 25 TABLET, FILM COATED ORAL at 08:26

## 2022-08-02 RX ADMIN — FUROSEMIDE 40 MG: 40 TABLET ORAL at 11:36

## 2022-08-02 RX ADMIN — ALBUTEROL SULFATE 2 PUFF: 90 AEROSOL, METERED RESPIRATORY (INHALATION) at 06:22

## 2022-08-02 RX ADMIN — NYSTATIN: 100000 POWDER TOPICAL at 20:39

## 2022-08-02 RX ADMIN — BUDESONIDE AND FORMOTEROL FUMARATE DIHYDRATE 2 PUFF: 160; 4.5 AEROSOL RESPIRATORY (INHALATION) at 06:22

## 2022-08-02 RX ADMIN — ATORVASTATIN CALCIUM 80 MG: 40 TABLET, FILM COATED ORAL at 20:38

## 2022-08-02 RX ADMIN — CLOPIDOGREL 75 MG: 75 TABLET, FILM COATED ORAL at 08:26

## 2022-08-02 RX ADMIN — Medication 10 ML: at 20:39

## 2022-08-02 RX ADMIN — Medication 10 ML: at 08:27

## 2022-08-02 RX ADMIN — ENOXAPARIN SODIUM 40 MG: 40 INJECTION SUBCUTANEOUS at 20:38

## 2022-08-02 RX ADMIN — CETIRIZINE HYDROCHLORIDE 10 MG: 10 TABLET, FILM COATED ORAL at 08:28

## 2022-08-02 RX ADMIN — ALBUTEROL SULFATE 2 PUFF: 90 AEROSOL, METERED RESPIRATORY (INHALATION) at 02:19

## 2022-08-02 RX ADMIN — PREDNISONE 40 MG: 20 TABLET ORAL at 08:27

## 2022-08-02 RX ADMIN — NICOTINE TRANSDERMAL SYSTEM 1 PATCH: 21 PATCH, EXTENDED RELEASE TRANSDERMAL at 11:36

## 2022-08-02 RX ADMIN — ASPIRIN 81 MG: 81 TABLET, CHEWABLE ORAL at 08:27

## 2022-08-02 RX ADMIN — DOCUSATE SODIUM 50 MG AND SENNOSIDES 8.6 MG 2 TABLET: 8.6; 5 TABLET, FILM COATED ORAL at 20:38

## 2022-08-02 RX ADMIN — PANTOPRAZOLE SODIUM 40 MG: 40 TABLET, DELAYED RELEASE ORAL at 06:46

## 2022-08-02 RX ADMIN — NYSTATIN: 100000 POWDER TOPICAL at 08:28

## 2022-08-02 RX ADMIN — FLUTICASONE PROPIONATE 1 SPRAY: 50 SPRAY, METERED NASAL at 08:28

## 2022-08-02 RX ADMIN — METOPROLOL TARTRATE 25 MG: 25 TABLET, FILM COATED ORAL at 20:38

## 2022-08-02 RX ADMIN — ALBUTEROL SULFATE 2 PUFF: 90 AEROSOL, METERED RESPIRATORY (INHALATION) at 18:47

## 2022-08-02 RX ADMIN — DOCUSATE SODIUM 50 MG AND SENNOSIDES 8.6 MG 2 TABLET: 8.6; 5 TABLET, FILM COATED ORAL at 08:27

## 2022-08-02 RX ADMIN — ALBUTEROL SULFATE 2 PUFF: 90 AEROSOL, METERED RESPIRATORY (INHALATION) at 20:39

## 2022-08-02 RX ADMIN — Medication 10 ML: at 08:28

## 2022-08-02 RX ADMIN — GUAIFENESIN 1200 MG: 600 TABLET, EXTENDED RELEASE ORAL at 08:26

## 2022-08-02 RX ADMIN — BUDESONIDE AND FORMOTEROL FUMARATE DIHYDRATE 2 PUFF: 160; 4.5 AEROSOL RESPIRATORY (INHALATION) at 18:47

## 2022-08-02 NOTE — PROGRESS NOTES
"                                              LOS: 8 days   Patient Care Team:  Valarie Garcia APRN as PCP - General (Family Medicine)    Chief Complaint:  F/up COPD, chronic respiratory failure    Subjective   Interval History  On 4 L oxygen.  Reported no shortness of breath or cough.    REVIEW OF SYSTEMS:     GASTROINTESTINAL: No anorexia, nausea, vomiting or diarrhea. No abdominal pain.  CONSTITUTIONAL: No fever or chills.     Ventilator/Non-Invasive Ventilation Settings (From admission, onward)             Start     Ordered    07/29/22 1057  NIPPV (CPAP or BIPAP)  At Bedtime & As Needed-RT        Comments: 40%   Question Answer Comment   Type: BIPAP    IPAP 20    EPAP 8    Breath Rate 20        07/29/22 1056    07/25/22 0823  NIPPV (CPAP or BIPAP)  Until Discontinued,   Status:  Canceled        Comments: 40%   Question Answer Comment   Type: BIPAP    IPAP 24    EPAP 8    Breath Rate 28        07/25/22 0822    07/24/22 2254  NIPPV (CPAP or BIPAP)  Until Discontinued,   Status:  Canceled        Comments: Resp to manage   Question:  Type:  Answer:  BIPAP    07/24/22 2253                      Physical Exam:     Vital Signs  Temp:  [97 °F (36.1 °C)-98.3 °F (36.8 °C)] 97.3 °F (36.3 °C)  Heart Rate:  [57-91] 78  Resp:  [14-28] 14  BP: ()/(52-88) 98/83    Intake/Output Summary (Last 24 hours) at 8/2/2022 1332  Last data filed at 8/2/2022 0900  Gross per 24 hour   Intake 350 ml   Output 1350 ml   Net -1000 ml     Flowsheet Rows    Flowsheet Row First Filed Value   Admission Height 177.8 cm (70\") Documented at 07/24/2022 2140   Admission Weight 68 kg (150 lb) Documented at 07/24/2022 2140          PPE used per hospital policy    General Appearance:   Alert, cooperative, in no acute distress   ENMT:  Mallampati score 3, moist mucous membrane   Eyes:  Pupils equal and reactive to light. EOMI   Neck:   Large. Trachea midline. No thyromegaly.   Lungs:    No labored breathing or use of accessory muscles     "   Skin:   No rash or ecchymosis   Abdomen:    No paradoxical breathing   Neuro/psych:  Conscious, alert, oriented x3.  Moves all extremities   Extremities:  No cyanosis, clubbing or edema.            Results Review:        Results from last 7 days   Lab Units 08/02/22  0109 08/01/22  0105 07/31/22  0258   SODIUM mmol/L 135* 136 133*   POTASSIUM mmol/L 4.3 4.2 4.3   CHLORIDE mmol/L 90* 91* 91*   CO2 mmol/L 36.4* 34.9* 33.7*   BUN mg/dL 29* 23* 26*   CREATININE mg/dL 0.62* 0.49* 0.47*   GLUCOSE mg/dL 143* 130* 126*   CALCIUM mg/dL 9.0 8.8 9.2     Results from last 7 days   Lab Units 07/26/22  1617   TROPONIN T ng/mL <0.010     Results from last 7 days   Lab Units 08/02/22  0109 08/01/22  0105 07/31/22  0259   WBC 10*3/mm3 14.32* 14.67* 15.45*   HEMOGLOBIN g/dL 13.4 13.9 14.4   HEMATOCRIT % 41.3 41.9 43.5   PLATELETS 10*3/mm3 268 289 298         Results from last 7 days   Lab Units 08/01/22  1050   PROBNP pg/mL 39.9       Results from last 7 days   Lab Units 08/01/22  1050   D DIMER QUANT MCGFEU/mL 0.48             Results from last 7 days   Lab Units 07/28/22  0652   PH, ARTERIAL pH units 7.497*   PCO2, ARTERIAL mm Hg 48.2*   PO2 ART mm Hg 46.3*   O2 SATURATION ART % 87.0*   MODALITY  BiPap         I reviewed the patient's new clinical results.        Medication Review:   albuterol sulfate HFA, 2 puff, Inhalation, 4x Daily - RT  aspirin, 81 mg, Oral, Daily  atorvastatin, 80 mg, Oral, Nightly  budesonide-formoterol, 2 puff, Inhalation, BID - RT  cetirizine, 10 mg, Oral, Daily  clopidogrel, 75 mg, Oral, Daily  enoxaparin, 40 mg, Subcutaneous, Nightly  fluticasone, 1 spray, Nasal, Daily  furosemide, 40 mg, Oral, Daily  guaiFENesin, 1,200 mg, Oral, BID  ipratropium-albuterol, 3 mL, Nebulization, 4x Daily - RT  metoprolol tartrate, 25 mg, Oral, Q12H  nicotine, 1 patch, Transdermal, Q24H  nystatin, , Topical, Q12H  pantoprazole, 40 mg, Oral, QAM AC  predniSONE, 40 mg, Oral, Daily With Breakfast  senna-docusate sodium, 2  tablet, Oral, BID  sodium chloride, 10 mL, Intravenous, Q12H  sodium chloride, 10 mL, Intravenous, Q12H             Diagnostic imaging:  I personally and independently reviewed the following images:  CXR 7/30/2022 and CT chest 7/24/2022: Trace pleural effusion bilaterally on the CXR with emphysematous changes.  CT showed emphysematous changes and small patchy opacity in the right upper lobe anteriorly in addition to elevated right hemidiaphragm and right lower lobe atelectasis.  Compared to 5/10/2022 CT chest, there was an mild bandlike scarring/atelectasis in the right upper lobe anteriorly in the same location where likely pulmonary opacity described on the latest CT.      Assessment     1. COPD exacerbation  2. COVID-19 URI  3. Acute on chronic hypoxic and hypercapnic respiratory failure, secondary to above, treated with NIPPV, on oxygen 5 L/minute baseline  4. Pulmonary opacities: Mostly atelectasis/scarring  5. Trace pleural effusion bilaterally.      Plan       · Prednisone 40 mg day 1.  Recommend slow titration over the next week or so.  · Symbicort 2 puffs twice daily  · DuoNeb 4 times a day  · Nicotine patch  · Oxygen by NC and titrate to keep SPO2 89-92%.  Avoid hyperoxia in the setting of hypercarbia.  He is requiring less oxygen support and his baseline    No much else to offer from pulmonary perspective.  Patient seems to be improving and currently around his baseline.  Okay to discharge home.  We will follow as needed on      Sari Loja MD  08/02/22  13:32 EDT            This note was dictated utilizing Dragon dictation

## 2022-08-02 NOTE — PROGRESS NOTES
"    [facility] HOSPITALIST PROGRESS NOTE     Patient Identification:  Name:  Emilio Guy  Age:  56 y.o.  Sex:  male  :  1965  MRN:  [id number]  Visit Number:  [id number]  ROOM: P210/S2     Primary Care Provider:  Valarie Garcia APRN    Length of stay in inpatient status:  8    Subjective     Chief Compliant:    Chief Complaint   Patient presents with   • Altered Mental Status     History of Presenting Illness:    Patient remains ill but stable today, no acute events overnight, no new complaints, denies any fevers or chills, reports his breathing is improved, reports his wheezing is improved, I was able to wean NC down to 4L in room, Pulmonary has transitioned to oral steroids, I have transitioned to oral lasix, patient requested to speak with  and have placed spiritual care consult, PT/OT consulted and following, patient deferred working with therapy yesterday, reported he did not sleep well today, encouraged out of bed to chair but patient deferred reporting he \"just want[ed] to rest\", Social Work consulted and following and to assist with placement.   Objective     Current Hospital Meds:  albuterol sulfate HFA, 2 puff, Inhalation, 4x Daily - RT  aspirin, 81 mg, Oral, Daily  atorvastatin, 80 mg, Oral, Nightly  budesonide-formoterol, 2 puff, Inhalation, BID - RT  cetirizine, 10 mg, Oral, Daily  clopidogrel, 75 mg, Oral, Daily  enoxaparin, 40 mg, Subcutaneous, Nightly  fluticasone, 1 spray, Nasal, Daily  furosemide, 40 mg, Oral, Daily  guaiFENesin, 1,200 mg, Oral, BID  ipratropium-albuterol, 3 mL, Nebulization, 4x Daily - RT  metoprolol tartrate, 25 mg, Oral, Q12H  nicotine, 1 patch, Transdermal, Q24H  nystatin, , Topical, Q12H  pantoprazole, 40 mg, Oral, QAM AC  predniSONE, 40 mg, Oral, Daily With Breakfast  senna-docusate sodium, 2 tablet, Oral, BID  sodium chloride, 10 mL, Intravenous, Q12H  sodium chloride, 10 mL, Intravenous, " Q12H    ----------------------------------------------------------------------------------------------------------------------  Vital Signs:  Temp:  [97 °F (36.1 °C)-98.3 °F (36.8 °C)] 97.7 °F (36.5 °C)  Heart Rate:  [57-91] 82  Resp:  [14-28] 14  BP: (112-139)/(52-88) 119/72  SpO2:  [94 %-100 %] 97 %  on  Flow (L/min):  [5] 5;   Device (Oxygen Therapy): humidified;nasal cannula  Body mass index is 23.76 kg/m².      Intake/Output Summary (Last 24 hours) at 8/2/2022 [facility]  Last data filed at 8/2/2022 0900  Gross per 24 hour   Intake 770 ml   Output 1660 ml   Net -890 ml      ----------------------------------------------------------------------------------------------------------------------  Physical exam:  Constitutional: older than stated age, No acute distress.      HENT:  Head:  Normocephalic and atraumatic.  Mouth:  Moist mucous membranes.    Eyes:  Conjunctivae and EOM are normal. No scleral icterus.    Neck:  Neck supple.  No JVD present.    Cardiovascular:  Normal rate, regular rhythm and normal heart sounds with no murmur.  Pulmonary/Chest:  Chronic respiratory distress, improved wheezes, no crackles, on 5LNC though I turned down to 4LNC and maintained 96% sats  Abdominal:  Soft. No distension and no tenderness.   Musculoskeletal:  No tenderness, and no deformity.   Neurological:  Alert and oriented to person, place, and time.  No cranial nerve deficit.    Skin:  Skin is warm and dry. No rash noted. No pallor.   Peripheral vascular:  No clubbing, no cyanosis, no edema.  Psychiatric: Appropriate mood and affect    Edited by: Jose M Mayer MD at 8/2/2022 1012  ----------------------------------------------------------------------------------------------------------------------  CBC - WBC/Hgb/Hct/Plts: 14.32*/13.4/41.3/268 (08/02 0109)  CHEM - BUN/Cr/glu/ALT/AST/amyl/lip:29*/0.62*/143*/21/21/--/-- (08/02 0109)  LYTES - Na/K/Cl/CO2: 135*/4.3/90*/36.4* (08/02 0109)     No results found for: URINECX  No results  found for: BLOODCX    I have personally looked at the labs and they are summarized above.  ----------------------------------------------------------------------------------------------------------------------  Detailed radiology reports for the last 24 hours:  No radiology results for the last day  Assessment & Plan    56M PMH chronic respiratory failure on 3 L nasal cannula at home due to COPD who presented with worsening shortness of breath and diagnosed with an acute exacerbation of COPD.     #Sepsis (Not POA), Acute Metabolic Encephalopathy (POA), Acute Hypoxic/Hypercarbic on Chronic Hypoxic Respiratory Failure requiring Non-invasive Positive Pressure Ventilation due Pneumonia, Bacterial, treating for Community Acquired in setting of COPD/Emphysema with Acute Exacerbation, though also noted new Coronavirus 19 + and unknown clinical significance though treated with antiviral medication  #Pulmonary Adenopathy, largest 1.4cm  - Patient presented with increased 02 requirement from home, was on 3L but required Bipap now on 5LNC, did not initially meet sepsis criteria but later with worsening WBC count and heart rate > 90, notably with increased dyspnea, work of breathing, wheezing, imaging concern for bacterial Pneumonia though also with new Coronavirus 19 diagnosis. Patient also known to prior MD and suspected home trilogy nonadherence contributing to illness.   - Admission labs showed 10K->12K, 2.09, lactate 0.7, procalcitonin 0.06, d-dimer 1.21, covid +, strep pneumo and legionella negative, blood cultures negative to date, ABG pH down to 7.2, PC02 up to 91, P02 down to 46.   - CTPE showed no Pulmonary Embolism, emphysema with chronic changes bilateral lungs, mild Pneumonia RLL and minimal patchy infiltrate RUL as well, adenopathy suspected reactive, recommended 3m follow up chest CT  - Bilateral Venous Duplex pending  - Infectious Disease not consulted per prior MD  - Pulmonary consulted and following  -  Palliative Care consulted and following for goals of care conversations, patient requested to speak with  today and placed Spiritual Care consult  - Has completed 7 days Ceftriaxone/Cefepime and Doxycycline, has completed 5 days Remdesivir  - Pulmonary has transitioned to oral Prednisone  - Continue Level I anticoagulation for Coronavirus 19 thromboembolism prophylaxis  - Continue Tylenol as needed for fevers  - Continue home Symbicort, Cetirizine, new Albuterol Inhaler, scheduled duonebs  - Trend Coronavirus 19 progression labs with CBC, CMP, CRP, D-dimer daily   - Monitor in PCU, enhanced droplet/contact isolation precautions, continue 02 but wean as able      #Subacute MCA stroke in setting of Chronic appearing left cerebellar infarction  - Imaging abnormalities noted on CT, TeleNeurology consulted with evaluation 7/26, felt likely watershed secondary to significant supraclinoid ICA stenosis. Risk factors include hypertension, tobacco abuse, alcohol abuse  - Continue Aspirin 81, statin, plavix   - PT/OT following, monitor on telemetry  - Patient deferred MRI reporting he could not lay flat or be in confined space     #Hypertension  #Chronic HFpEF  - Labs showed troponins < 0.010, proBNP 710  - EKG showed normal sinus rhythm, nonspecific ST changes  - Echocardiogram showed LVEF 66-70%, diastolic dysfunction  - Continue home Aspirin 81, Statin  - Continue home Beta Blocker, titrate as indicated  - No home ACEI/ARB/ARNI, add as indicated, will consider adding low dose lisinopril pending blood pressure and creatinine trend  - RedsVest reading normal, proBNP normal, creatinine did bump to 0.6 today, transitioned IV to oral lasix, trend creatinine and UOP  - Continue to monitor on telemetry, strict I/O's, trend heart rate and blood pressure      #Chronic alcohol use  - Drinks 3-4 beers per day.  No history of withdrawal. continue CIWA.     #Gastroesophageal Reflux Disease  - Continue home PPI      #Anxiety/Depression  - Continue home regimen     #Constipation  - Continue Doc-Senna, Miralax     #Tobacco Dependence  - Recommended cessation, nicotine replacement therapy as needed     #Medical Non-Adherence  - Given patient's choices and actions to not adhere to medical treatments and recommendations, this will drastically affect their short and long term morbidity and mortality.       F: Oral  E: Monitor & Replace PRN  N: Diet Soft Texture; Chopped   PPx: Prophylactic Lovenox (Level I)  Code Status (Patient has no pulse and is not breathing): No CPR (Do Not Attempt to Resuscitate)  Medical Interventions (Patient has pulse or is breathing): Limited Support  Medical Intervention Limits: NO intubation (DNI)     Dispo: Pending workup and clinical improvement, PT/OT consulted and following, Social Work to assist with inpatient rehab placement, though will need to be out of Coronavirus isolation reportedly.      *This patient is considered high risk due to sepsis, Acute Metabolic Encephalopathy, acute respiratory failure, Coronavirus 19, monitored for drug toxicities on Remdesivir.     Edited by: Jose M Mayer MD at 8/2/2022 [address] [place] [address]

## 2022-08-02 NOTE — PLAN OF CARE
Goal Outcome Evaluation:           Progress: improving  Outcome Evaluation: Pt AOx4. VSS at this time. Pt titrated down to 4L NC, tolerating well. PO lasix started per order. Pt appears to be in no distress and has no complaints at this time. Bed locked, in low position, alarm set, call light within reach. Will continue to monitor throughout shift.

## 2022-08-02 NOTE — PROGRESS NOTES
Stroke Progress Note       Chief Complaint: Left MCA stroke noted on CT head    Subjective    Subjective     Subjective:  Patient seems to have much improved from his breathing standpoint, and is now on nasal cannula.  Continues to have shortness of breath, but much improved.  Denies any stroke symptoms.  He is super claustrophobic, and does not want to do MRI.    Review of Systems   Constitutional: Negative.    HENT: Negative.    Eyes: Negative.    Respiratory: Positive for shortness of breath.    Cardiovascular: Negative.    Gastrointestinal: Negative.    Endocrine: Negative.    Genitourinary: Negative.    Musculoskeletal: Negative.    Skin: Negative.    Allergic/Immunologic: Negative.    Psychiatric/Behavioral: Negative.             Objective    Objective      Temp:  [97 °F (36.1 °C)-98.3 °F (36.8 °C)] 97.3 °F (36.3 °C)  Heart Rate:  [57-91] 78  Resp:  [14-28] 14  BP: ()/(52-88) 98/83        Neurological Exam  Mental Status  Awake and alert. Speech is normal. Language is fluent with no aphasia.    Cranial Nerves  CN II: Visual fields full to confrontation.  CN III, IV, VI: Extraocular movements intact bilaterally. Pupils equal round and reactive to light bilaterally.  CN V: Facial sensation is normal.  CN VII: Full and symmetric facial movement.  CN VIII: Equal hearing bilaterally.  CN IX, X: Palate elevates symmetrically  CN XI: Shoulder shrug strength is normal.  CN XII: Tongue midline without atrophy or fasciculations.    Motor  Normal muscle bulk throughout. No fasciculations present.  Moving all 4 extremities equally, no obvious focal weakness.    Sensory  Light touch is normal in upper and lower extremities.     Reflexes  Not assessed.    Coordination    No dysmetria.    Gait    Not assessed.      Physical Exam  Vitals and nursing note reviewed.   Constitutional:       General: He is awake.      Appearance: Normal appearance.   HENT:      Head: Normocephalic and atraumatic.      Mouth/Throat:       Mouth: Mucous membranes are moist.      Pharynx: Oropharynx is clear.   Eyes:      Extraocular Movements: Extraocular movements intact.      Conjunctiva/sclera: Conjunctivae normal.      Pupils: Pupils are equal, round, and reactive to light.   Cardiovascular:      Rate and Rhythm: Normal rate and regular rhythm.   Pulmonary:      Effort: Pulmonary effort is normal. No respiratory distress.      Breath sounds: No wheezing.   Musculoskeletal:      Cervical back: Normal range of motion and neck supple.   Neurological:      Mental Status: He is alert.   Psychiatric:         Mood and Affect: Mood normal.         Speech: Speech normal.         Behavior: Behavior normal.         Results Review:    Reviewed his labs, no new intracranial imaging.    Results for orders placed during the hospital encounter of 07/24/22    Adult Transthoracic Echo Complete W/ Cont if Necessary Per Protocol (With Agitated Saline)    Interpretation Summary  · Left ventricular ejection fraction appears to be 66 - 70%. Left ventricular systolic function is normal.  · Left ventricular diastolic function is consistent with (grade I) impaired relaxation.            Assessment/Plan     Assessment/Plan:  56-year-old white male with known diagnosis of COPD, CHF, alcohol abuse, smoking, who was admitted with altered mental status and noted to have hypoxia and hypercarbia, but CT head showed a subacute left MCA stroke for which we were consulted.  Exam does not show any focal findings as such.       1. Left MCA stroke.  Likely watershed, secondary to significant supraclinoid ICA stenosis.  Continue him on dual antiplatelets and statin for secondary stroke prevention.  Ideally, MRI brain would have been nice, but patient is very claustrophobic and refusing MRI brain.  Given the limited information we have, we will plan to continue him on dual antiplatelets and statins.  2. Cerebral atherosclerosis with bilateral carotid stenosis intracranially.  Continue  him on dual antiplatelets and statins.  3. Respiratory failure with hypoxia and hypercarbia.  Much improved.  Now on nasal cannula.  Continue treatment as per the hospitalist team.  4. Smoking greater than 40 pack years.  Patient is a heavy smoker, encouraged him to quit smoking completely.  5. Alcohol use.  Encouraged him to cut down on his alcohol use.  6. Increase activity as tolerated.  Disposition as per the PT/OT.    Case was discussed with patient, nursing and will let the primary team know.  We will sign off for now, please call us with questions.  Thank you for the consult.      Part of this note was copied and pasted, although that note has been reviewed in details, and the documentation reflects plan as of today.    Ashok Joseph MD  08/02/22  14:40 EDT    This was an audio and video enabled telemedicine encounter.

## 2022-08-02 NOTE — NURSING NOTE
Rounding with Tele-neurologist Dr. Joseph.   Covid Isolation precautions in use. Pt continues to refuse MRI due to claustrophobia. He voices that he tried one once and jumped off the table. No acute distress ntd. Tele-cart cleaned per protocol after visit.

## 2022-08-02 NOTE — SIGNIFICANT NOTE
08/02/22 0951   OTHER   Discipline physical therapist   Rehab Time/Intention   Session Not Performed patient/family declined treatment;other (see comments)  (Pt states he did not sleep well last PM and requested to rest.)   Recommendation   PT - Next Appointment 08/03/22

## 2022-08-02 NOTE — PAYOR COMM NOTE
"[facility]  NPI:[id number]    Utilization Review  Contact: Catalina Hernandez RN  Phone: [phone]  Fax:[phone]    CLINICAL UPDATE   [id number]    Jack Guy (56 y.o. Male)             Date of Birth   1965    Social Security Number       Address   [address] [place] [id number]    Home Phone   [phone]    MRN   [id number]       Baptism   None    Marital Status   Single                            Admission Date   7/24/22    Admission Type   Emergency    Admitting Provider   Thomas Darden DO    Attending Provider   Jose M Mayer MD    Department, Room/Bed   [facility] PROGRESS CARE, P210/S2       Discharge Date       Discharge Disposition       Discharge Destination                               Attending Provider: Jose M Mayer MD    Allergies: Penicillins    Isolation: Enh Drop/Con   Infection: COVID (confirmed) (07/24/22)   Code Status: No CPR   Advance Care Planning Activity    Ht: 177.8 cm (70\")   Wt: 75.1 kg (165 lb 9.1 oz)    Admission Cmt: None   Principal Problem: None                Active Insurance as of 7/24/2022     Primary Coverage     Payor Plan Insurance Group Employer/Plan Group    AET [employer] [facility] [place] [facility]      Payor Plan Address Payor Plan Phone Number Payor Plan Fax Number Effective Dates    [address]   6/1/2015 - None Entered    [facility] [phone]       Subscriber Name Subscriber Birth Date Member ID       JACK GUY 1965 [id number]                 Emergency Contacts      (Rel.) Home Phone Work Phone Mobile Phone    Barbie Guy (Mother) [phone] -- [phone]    CristianStephenie (Daughter) [phone] -- --        Jose M Mayer MD    Physician   Hospitalist   Progress Notes       Signed   Date of Service:  08/02/22 1016   Creation Time:  08/02/22 1016              Signed                Show:Clear all  [x]Manual[x]Template[]Copied    Added by:  [x]Jose M Mayer, " "MD      [][place] for details         [facility]     Patient Identification:  Name:  Emilio Guy  Age:  56 y.o.  Sex:  male  :  1965  MRN:  [id number]  Visit Number:  [id number]  ROOM: P210/S2      Primary Care Provider:  Valarie Garcia APRN     Length of stay in inpatient status:  8     Subjective      Chief Compliant:        Chief Complaint   Patient presents with   • Altered Mental Status      History of Presenting Illness:    Patient remains ill but stable today, no acute events overnight, no new complaints, denies any fevers or chills, reports his breathing is improved, reports his wheezing is improved, I was able to wean NC down to 4L in room, Pulmonary has transitioned to oral steroids, I have transitioned to oral lasix, patient requested to speak with  and have placed spiritual care consult, PT/OT consulted and following, patient deferred working with therapy yesterday, reported he did not sleep well today, encouraged out of bed to chair but patient deferred reporting he \"just want[ed] to rest\", Social Work consulted and following and to assist with placement.   Objective      Current Hospital Meds:  albuterol sulfate HFA, 2 puff, Inhalation, 4x Daily - RT  aspirin, 81 mg, Oral, Daily  atorvastatin, 80 mg, Oral, Nightly  budesonide-formoterol, 2 puff, Inhalation, BID - RT  cetirizine, 10 mg, Oral, Daily  clopidogrel, 75 mg, Oral, Daily  enoxaparin, 40 mg, Subcutaneous, Nightly  fluticasone, 1 spray, Nasal, Daily  furosemide, 40 mg, Oral, Daily  guaiFENesin, 1,200 mg, Oral, BID  ipratropium-albuterol, 3 mL, Nebulization, 4x Daily - RT  metoprolol tartrate, 25 mg, Oral, Q12H  nicotine, 1 patch, Transdermal, Q24H  nystatin, , Topical, Q12H  pantoprazole, 40 mg, Oral, QAM AC  predniSONE, 40 mg, Oral, Daily With Breakfast  senna-docusate sodium, 2 tablet, Oral, BID  sodium chloride, 10 mL, Intravenous, Q12H  sodium chloride, 10 mL, Intravenous, " Q12H     ----------------------------------------------------------------------------------------------------------------------  Vital Signs:  Temp:  [97 °F (36.1 °C)-98.3 °F (36.8 °C)] 97.7 °F (36.5 °C)  Heart Rate:  [57-91] 82  Resp:  [14-28] 14  BP: (112-139)/(52-88) 119/72  SpO2:  [94 %-100 %] 97 %  on  Flow (L/min):  [5] 5;   Device (Oxygen Therapy): humidified;nasal cannula  Body mass index is 23.76 kg/m².        Intake/Output Summary (Last 24 hours) at 8/2/2022 [facility]  Last data filed at 8/2/2022 0900      Gross per 24 hour   Intake 770 ml   Output 1660 ml   Net -890 ml      ----------------------------------------------------------------------------------------------------------------------  Physical exam:  Constitutional: older than stated age, No acute distress.      HENT:  Head:  Normocephalic and atraumatic.  Mouth:  Moist mucous membranes.    Eyes:  Conjunctivae and EOM are normal. No scleral icterus.    Neck:  Neck supple.  No JVD present.    Cardiovascular:  Normal rate, regular rhythm and normal heart sounds with no murmur.  Pulmonary/Chest:  Chronic respiratory distress, improved wheezes, no crackles, on 5LNC though I turned down to 4LNC and maintained 96% sats  Abdominal:  Soft. No distension and no tenderness.   Musculoskeletal:  No tenderness, and no deformity.   Neurological:  Alert and oriented to person, place, and time.  No cranial nerve deficit.    Skin:  Skin is warm and dry. No rash noted. No pallor.   Peripheral vascular:  No clubbing, no cyanosis, no edema.  Psychiatric: Appropriate mood and affect     Edited by: Jose M Mayer MD at 8/2/2022 1012  ----------------------------------------------------------------------------------------------------------------------  CBC - WBC/Hgb/Hct/Plts: 14.32*/13.4/41.3/268 (08/02 0109)  CHEM - BUN/Cr/glu/ALT/AST/amyl/lip:29*/0.62*/143*/21/21/--/-- (08/02 0109)  LYTES - Na/K/Cl/CO2: 135*/4.3/90*/36.4* (08/02 0109)  No results found for: URINECX  No  results found for: BLOODCX     I have personally looked at the labs and they are summarized above.  ----------------------------------------------------------------------------------------------------------------------  Detailed radiology reports for the last 24 hours:  No radiology results for the last day  Assessment & Plan    56M PMH chronic respiratory failure on 3 L nasal cannula at home due to COPD who presented with worsening shortness of breath and diagnosed with an acute exacerbation of COPD.     #Sepsis (Not POA), Acute Metabolic Encephalopathy (POA), Acute Hypoxic/Hypercarbic on Chronic Hypoxic Respiratory Failure requiring Non-invasive Positive Pressure Ventilation due Pneumonia, Bacterial, treating for Community Acquired in setting of COPD/Emphysema with Acute Exacerbation, though also noted new Coronavirus 19 + and unknown clinical significance though treated with antiviral medication  #Pulmonary Adenopathy, largest 1.4cm  - Patient presented with increased 02 requirement from home, was on 3L but required Bipap now on 5LNC, did not initially meet sepsis criteria but later with worsening WBC count and heart rate > 90, notably with increased dyspnea, work of breathing, wheezing, imaging concern for bacterial Pneumonia though also with new Coronavirus 19 diagnosis. Patient also known to prior MD and suspected home trilogy nonadherence contributing to illness.   - Admission labs showed 10K->12K, 2.09, lactate 0.7, procalcitonin 0.06, d-dimer 1.21, covid +, strep pneumo and legionella negative, blood cultures negative to date, ABG pH down to 7.2, PC02 up to 91, P02 down to 46.   - CTPE showed no Pulmonary Embolism, emphysema with chronic changes bilateral lungs, mild Pneumonia RLL and minimal patchy infiltrate RUL as well, adenopathy suspected reactive, recommended 3m follow up chest CT  - Bilateral Venous Duplex pending  - Infectious Disease not consulted per prior MD  - Pulmonary consulted and  following  - Palliative Care consulted and following for goals of care conversations, patient requested to speak with  today and placed Spiritual Care consult  - Has completed 7 days Ceftriaxone/Cefepime and Doxycycline, has completed 5 days Remdesivir  - Pulmonary has transitioned to oral Prednisone  - Continue Level I anticoagulation for Coronavirus 19 thromboembolism prophylaxis  - Continue Tylenol as needed for fevers  - Continue home Symbicort, Cetirizine, new Albuterol Inhaler, scheduled duonebs  - Trend Coronavirus 19 progression labs with CBC, CMP, CRP, D-dimer daily   - Monitor in PCU, enhanced droplet/contact isolation precautions, continue 02 but wean as able      #Subacute MCA stroke in setting of Chronic appearing left cerebellar infarction  - Imaging abnormalities noted on CT, TeleNeurology consulted with evaluation 7/26, felt likely watershed secondary to significant supraclinoid ICA stenosis. Risk factors include hypertension, tobacco abuse, alcohol abuse  - Continue Aspirin 81, statin, plavix   - PT/OT following, monitor on telemetry  - Patient deferred MRI reporting he could not lay flat or be in confined space     #Hypertension  #Chronic HFpEF  - Labs showed troponins < 0.010, proBNP 710  - EKG showed normal sinus rhythm, nonspecific ST changes  - Echocardiogram showed LVEF 66-70%, diastolic dysfunction  - Continue home Aspirin 81, Statin  - Continue home Beta Blocker, titrate as indicated  - No home ACEI/ARB/ARNI, add as indicated, will consider adding low dose lisinopril pending blood pressure and creatinine trend  - RedsVest reading normal, proBNP normal, creatinine did bump to 0.6 today, transitioned IV to oral lasix, trend creatinine and UOP  - Continue to monitor on telemetry, strict I/O's, trend heart rate and blood pressure      #Chronic alcohol use  - Drinks 3-4 beers per day.  No history of withdrawal. continue CIWA.     #Gastroesophageal Reflux Disease  - Continue home PPI      #Anxiety/Depression  - Continue home regimen     #Constipation  - Continue Doc-Senna, Miralax     #Tobacco Dependence  - Recommended cessation, nicotine replacement therapy as needed     #Medical Non-Adherence  - Given patient's choices and actions to not adhere to medical treatments and recommendations, this will drastically affect their short and long term morbidity and mortality.       F: Oral  E: Monitor & Replace PRN  N: Diet Soft Texture; Chopped   PPx: Prophylactic Lovenox (Level I)  Code Status (Patient has no pulse and is not breathing): No CPR (Do Not Attempt to Resuscitate)  Medical Interventions (Patient has pulse or is breathing): Limited Support  Medical Intervention Limits: NO intubation (DNI)     Dispo: Pending workup and clinical improvement, PT/OT consulted and following, Social Work to assist with inpatient rehab placement, though will need to be out of Coronavirus isolation reportedly.      *This patient is considered high risk due to sepsis, Acute Metabolic Encephalopathy, acute respiratory failure, Coronavirus 19, monitored for drug toxicities on Remdesivir.      Edited by: Jose M Mayer MD at 8/2/2022 [address] [place] [address]

## 2022-08-02 NOTE — PLAN OF CARE
Goal Outcome Evaluation:           Progress: no change  Outcome Evaluation: Pt a&o x4. continues on 5L NC.No change at this shift.  Call light in reach with urinal. Bed low, locked, and alarmed.

## 2022-08-03 LAB
ALBUMIN SERPL-MCNC: 3.57 G/DL (ref 3.5–5.2)
ALBUMIN/GLOB SERPL: 1.8 G/DL
ALP SERPL-CCNC: 38 U/L (ref 39–117)
ALT SERPL W P-5'-P-CCNC: 24 U/L (ref 1–41)
ANION GAP SERPL CALCULATED.3IONS-SCNC: 8.3 MMOL/L (ref 5–15)
AST SERPL-CCNC: 20 U/L (ref 1–40)
BASOPHILS # BLD AUTO: 0.01 10*3/MM3 (ref 0–0.2)
BASOPHILS NFR BLD AUTO: 0.1 % (ref 0–1.5)
BILIRUB SERPL-MCNC: 0.4 MG/DL (ref 0–1.2)
BUN SERPL-MCNC: 20 MG/DL (ref 6–20)
BUN/CREAT SERPL: 36.4 (ref 7–25)
CALCIUM SPEC-SCNC: 8.9 MG/DL (ref 8.6–10.5)
CHLORIDE SERPL-SCNC: 94 MMOL/L (ref 98–107)
CO2 SERPL-SCNC: 33.7 MMOL/L (ref 22–29)
CREAT SERPL-MCNC: 0.55 MG/DL (ref 0.76–1.27)
DEPRECATED RDW RBC AUTO: 43.4 FL (ref 37–54)
EGFRCR SERPLBLD CKD-EPI 2021: 117 ML/MIN/1.73
EOSINOPHIL # BLD AUTO: 0.09 10*3/MM3 (ref 0–0.4)
EOSINOPHIL NFR BLD AUTO: 0.6 % (ref 0.3–6.2)
ERYTHROCYTE [DISTWIDTH] IN BLOOD BY AUTOMATED COUNT: 12.5 % (ref 12.3–15.4)
GLOBULIN UR ELPH-MCNC: 2 GM/DL
GLUCOSE SERPL-MCNC: 103 MG/DL (ref 65–99)
HCT VFR BLD AUTO: 40.6 % (ref 37.5–51)
HGB BLD-MCNC: 13.3 G/DL (ref 13–17.7)
IMM GRANULOCYTES # BLD AUTO: 0.13 10*3/MM3 (ref 0–0.05)
IMM GRANULOCYTES NFR BLD AUTO: 0.9 % (ref 0–0.5)
LYMPHOCYTES # BLD AUTO: 3.67 10*3/MM3 (ref 0.7–3.1)
LYMPHOCYTES NFR BLD AUTO: 25.6 % (ref 19.6–45.3)
MCH RBC QN AUTO: 30.6 PG (ref 26.6–33)
MCHC RBC AUTO-ENTMCNC: 32.8 G/DL (ref 31.5–35.7)
MCV RBC AUTO: 93.3 FL (ref 79–97)
MONOCYTES # BLD AUTO: 1.55 10*3/MM3 (ref 0.1–0.9)
MONOCYTES NFR BLD AUTO: 10.8 % (ref 5–12)
NEUTROPHILS NFR BLD AUTO: 62 % (ref 42.7–76)
NEUTROPHILS NFR BLD AUTO: 8.86 10*3/MM3 (ref 1.7–7)
NRBC BLD AUTO-RTO: 0 /100 WBC (ref 0–0.2)
PLATELET # BLD AUTO: 257 10*3/MM3 (ref 140–450)
PMV BLD AUTO: 10.9 FL (ref 6–12)
POTASSIUM SERPL-SCNC: 3.7 MMOL/L (ref 3.5–5.2)
PROT SERPL-MCNC: 5.6 G/DL (ref 6–8.5)
RBC # BLD AUTO: 4.35 10*6/MM3 (ref 4.14–5.8)
SODIUM SERPL-SCNC: 136 MMOL/L (ref 136–145)
WBC NRBC COR # BLD: 14.31 10*3/MM3 (ref 3.4–10.8)

## 2022-08-03 PROCEDURE — 97530 THERAPEUTIC ACTIVITIES: CPT

## 2022-08-03 PROCEDURE — 94799 UNLISTED PULMONARY SVC/PX: CPT

## 2022-08-03 PROCEDURE — 80053 COMPREHEN METABOLIC PANEL: CPT | Performed by: STUDENT IN AN ORGANIZED HEALTH CARE EDUCATION/TRAINING PROGRAM

## 2022-08-03 PROCEDURE — 99232 SBSQ HOSP IP/OBS MODERATE 35: CPT | Performed by: INTERNAL MEDICINE

## 2022-08-03 PROCEDURE — 97116 GAIT TRAINING THERAPY: CPT

## 2022-08-03 PROCEDURE — 25010000002 ENOXAPARIN PER 10 MG: Performed by: STUDENT IN AN ORGANIZED HEALTH CARE EDUCATION/TRAINING PROGRAM

## 2022-08-03 PROCEDURE — 94761 N-INVAS EAR/PLS OXIMETRY MLT: CPT

## 2022-08-03 PROCEDURE — 85025 COMPLETE CBC W/AUTO DIFF WBC: CPT | Performed by: STUDENT IN AN ORGANIZED HEALTH CARE EDUCATION/TRAINING PROGRAM

## 2022-08-03 PROCEDURE — 63710000001 PREDNISONE PER 1 MG: Performed by: INTERNAL MEDICINE

## 2022-08-03 RX ADMIN — NICOTINE TRANSDERMAL SYSTEM 1 PATCH: 21 PATCH, EXTENDED RELEASE TRANSDERMAL at 13:05

## 2022-08-03 RX ADMIN — BUDESONIDE AND FORMOTEROL FUMARATE DIHYDRATE 2 PUFF: 160; 4.5 AEROSOL RESPIRATORY (INHALATION) at 06:33

## 2022-08-03 RX ADMIN — CETIRIZINE HYDROCHLORIDE 10 MG: 10 TABLET, FILM COATED ORAL at 08:44

## 2022-08-03 RX ADMIN — FLUTICASONE PROPIONATE 1 SPRAY: 50 SPRAY, METERED NASAL at 08:46

## 2022-08-03 RX ADMIN — ENOXAPARIN SODIUM 40 MG: 40 INJECTION SUBCUTANEOUS at 21:11

## 2022-08-03 RX ADMIN — Medication 10 ML: at 08:45

## 2022-08-03 RX ADMIN — BUDESONIDE AND FORMOTEROL FUMARATE DIHYDRATE 2 PUFF: 160; 4.5 AEROSOL RESPIRATORY (INHALATION) at 19:08

## 2022-08-03 RX ADMIN — DOCUSATE SODIUM 50 MG AND SENNOSIDES 8.6 MG 2 TABLET: 8.6; 5 TABLET, FILM COATED ORAL at 21:12

## 2022-08-03 RX ADMIN — ALBUTEROL SULFATE 2 PUFF: 90 AEROSOL, METERED RESPIRATORY (INHALATION) at 06:33

## 2022-08-03 RX ADMIN — ATORVASTATIN CALCIUM 80 MG: 40 TABLET, FILM COATED ORAL at 21:13

## 2022-08-03 RX ADMIN — NYSTATIN: 100000 POWDER TOPICAL at 08:46

## 2022-08-03 RX ADMIN — ASPIRIN 81 MG: 81 TABLET, CHEWABLE ORAL at 08:48

## 2022-08-03 RX ADMIN — NYSTATIN: 100000 POWDER TOPICAL at 21:12

## 2022-08-03 RX ADMIN — GUAIFENESIN 1200 MG: 600 TABLET, EXTENDED RELEASE ORAL at 21:11

## 2022-08-03 RX ADMIN — ALBUTEROL SULFATE 2 PUFF: 90 AEROSOL, METERED RESPIRATORY (INHALATION) at 12:45

## 2022-08-03 RX ADMIN — METOPROLOL TARTRATE 25 MG: 25 TABLET, FILM COATED ORAL at 21:11

## 2022-08-03 RX ADMIN — PREDNISONE 40 MG: 20 TABLET ORAL at 08:43

## 2022-08-03 RX ADMIN — POLYETHYLENE GLYCOL (3350) 17 G: 17 POWDER, FOR SOLUTION ORAL at 13:05

## 2022-08-03 RX ADMIN — ALBUTEROL SULFATE 2 PUFF: 90 AEROSOL, METERED RESPIRATORY (INHALATION) at 19:08

## 2022-08-03 RX ADMIN — DOCUSATE SODIUM 50 MG AND SENNOSIDES 8.6 MG 2 TABLET: 8.6; 5 TABLET, FILM COATED ORAL at 08:45

## 2022-08-03 RX ADMIN — Medication 10 ML: at 21:12

## 2022-08-03 RX ADMIN — GUAIFENESIN 1200 MG: 600 TABLET, EXTENDED RELEASE ORAL at 08:43

## 2022-08-03 RX ADMIN — CLOPIDOGREL 75 MG: 75 TABLET, FILM COATED ORAL at 08:47

## 2022-08-03 RX ADMIN — PANTOPRAZOLE SODIUM 40 MG: 40 TABLET, DELAYED RELEASE ORAL at 08:42

## 2022-08-03 NOTE — PLAN OF CARE
Goal Outcome Evaluation:  Plan of Care Reviewed With: patient        Progress: improving  Outcome Evaluation: Alert oriented x 4.  Resting comfortably denies pain.  Ambulated in room with physical therapy.  Large BM.  3 liters nasal cannual at present.  Continue to monitor.

## 2022-08-03 NOTE — THERAPY TREATMENT NOTE
Acute Care - Physical Therapy Treatment Note  [facility]     Patient Name: Emilio Guy  : 1965  MRN: [id number]  Today's Date: 8/3/2022   Onset of Illness/Injury or Date of Surgery: 22  Visit Dx:     ICD-10-CM ICD-9-CM   1. Acute respiratory failure with hypoxia and hypercapnia (HCC)  J96.01 518.81    J96.02    2. COVID-19  U07.1 079.89   3. Cerebrovascular accident (CVA), unspecified mechanism (HCC)  I63.9 434.91     Patient Active Problem List   Diagnosis   • COPD with acute exacerbation (HCC)   • Acute on chronic respiratory failure with hypoxia (HCC)   • Acute respiratory failure with hypoxia and hypercapnia (HCC)     Past Medical History:   Diagnosis Date   • CHF (congestive heart failure) (HCC)    • COPD (chronic obstructive pulmonary disease) (HCC)      No past surgical history on file.  PT Assessment (last 12 hours)     PT Evaluation and Treatment     Row Name 22          Physical Therapy Time and Intention    Document Type therapy note (daily note)  -CS     Mode of Treatment physical therapy  -CS     Session Not Performed patient/family declined treatment;other (see comments)  Pt states he did not sleep well last PM and requested to rest.  -CS     Patient Effort good  -CS     Symptoms Noted During/After Treatment shortness of breath  -CS     Comment Pt seen bedside this AM.  Pt agreed to ambulation in room.  -CS     Row Name 22 0845          General Information    Patient Profile Reviewed yes  -CS     Existing Precautions/Restrictions seizures  -CS     Row Name 22          Living Environment    Primary Care Provided by self;parent(s)  Mother assist with meals, laundry  -CS     Row Name 2245          Home Use of Assistive/Adaptive Equipment    Equipment Currently Used at Home cane, quad;oxygen;nebulizer;other (see comments)  [employer] for Cane  -CS     Row Name 22          Cognition    Orientation Status (Cognition) oriented x 4   -     Row Name 08/03/22 0845          Range of Motion Comprehensive    General Range of Motion no range of motion deficits identified  -     Row Name 08/03/22 0845          Strength Comprehensive (MMT)    General Manual Muscle Testing (MMT) Assessment upper extremity strength deficits identified  -     Row Name 08/03/22 0845          Vision Assessment/Intervention    Visual Impairment/Limitations WFL  -     Row Name 08/03/22 0845          Bed Mobility    Bed Mobility bed mobility (all) activities  -     All Activities, [place] (Bed Mobility) minimum assist (75% patient effort);contact guard  -     Assistive Device (Bed Mobility) bed rails;draw sheet;head of bed elevated  -     Row Name 08/03/22 0845          Transfers    Transfers sit-stand transfer;stand-sit transfer  -     Sit-Stand [place] (Transfers) contact guard  -     Stand-Sit [place] (Transfers) contact guard  -CS     Row Name 08/03/22 0845          Sit-Stand Transfer    Assistive Device (Sit-Stand Transfers) other (see comments)  no AD  -CS     Row Name 08/03/22 0845          Stand-Sit Transfer    Assistive Device (Stand-Sit Transfers) other (see comments)  no AD  -CS     [facility] Name 08/03/22 0845          Gait/Stairs (Locomotion)    Gait/Stairs Locomotion gait/ambulation independence;gait/ambulation assistive device;distance ambulated  -     [place] Level (Gait) contact guard  -CS     Assistive Device (Gait) other (see comments)  no AD, close CGA/HHA  -     Distance in Feet (Gait) 10' x 2  -CS     Comment, (Gait/Stairs) Pt able to ambulate a few feet away from bed x 2 w/ rest between bouts.  -     Row Name 08/03/22 0845          Safety Issues, Functional Mobility    Impairments Affecting Function (Mobility) balance;endurance/activity tolerance;shortness of breath  -     Row Name 08/03/22 0845          Coping    Observed Emotional State cooperative;calm  -     Verbalized Emotional State acceptance  -      Family/Support Persons family  -CS     Involvement in Care not present at bedside  -CS     Row Name 08/03/22 0845          Vital Signs    Intra Systolic BP Rehab 93  -CS     Intra Treatment Diastolic BP 59  -CS     Intra SpO2 (%) 85  -CS     Row Name 08/03/22 0845          Positioning and Restraints    Pre-Treatment Position in bed  -CS     Post Treatment Position bed  -CS     In Bed call light within reach;encouraged to call for assist;exit alarm on  -CS     Row Name 08/03/22 0845          Therapy Assessment/Plan (PT)    Rehab Potential (PT) fair, will monitor progress closely  -CS     Criteria for Skilled Interventions Met (PT) yes;meets criteria;skilled treatment is necessary  -CS     Therapy Frequency (PT) 3 times/wk  3-5x/week  -     Problem List (PT) problems related to;balance;mobility;strength  -     Activity Limitations Related to Problem List (PT) unable to ambulate safely;unable to transfer safely  -     Row Name 08/03/22 0845          Progress Summary (PT)    Daily Progress Summary (PT) Pt w/ improved standing mobility only limited by O2 sats and SOA this day.  -     Row Name 08/03/22 0845          Physical Therapy Goals    Bed Mobility Goal Selection (PT) bed mobility, PT goal 1  -     Transfer Goal Selection (PT) transfer, PT goal 1  -     Gait Training Goal Selection (PT) gait training, PT goal 1  -     Row Name 08/03/22 0845          Bed Mobility Goal 1 (PT)    Activity/Assistive Device (Bed Mobility Goal 1, PT) bed mobility activities, all  -CS     [place] Level/Cues Needed (Bed Mobility Goal 1, PT) standby assist  -CS     Time Frame (Bed Mobility Goal 1, PT) long term goal (LTG);by discharge  -     Row Name 08/03/22 0845          Transfer Goal 1 (PT)    Activity/Assistive Device (Transfer Goal 1, PT) transfers, all  -CS     [place] Level/Cues Needed (Transfer Goal 1, PT) standby assist  -CS     Time Frame (Transfer Goal 1, PT) long term goal (LTG);by discharge  -     Row  Name 08/03/22 0845          Gait Training Goal 1 (PT)    Activity/Assistive Device (Gait Training Goal 1, PT) gait (walking locomotion);assistive device use  -CS     [place] Level (Gait Training Goal 1, PT) standby assist  -CS     Distance (Gait Training Goal 1, PT) 50'  -CS     Time Frame (Gait Training Goal 1, PT) long term goal (LTG);by discharge  -CS           User Key  (r) = Recorded By, (t) = Taken By, (c) = Cosigned By    Initials Name Provider Type    CS Kaiser Morley, PT Physical Therapist                Physical Therapy Education                 Title: PT OT SLP Therapies (Done)     Topic: Physical Therapy (Done)     Point: Mobility training (Done)     Learning Progress Summary           Patient Acceptance, E, VU by CL at 8/3/2022 1338      Show all documentation for this point (8)                 Point: Home exercise program (Done)     Learning Progress Summary           Patient Acceptance, E, VU by CL at 8/3/2022 1338      Show all documentation for this point (8)                 Point: Body mechanics (Done)     Learning Progress Summary           Patient Acceptance, E, VU by CL at 8/3/2022 1338      Show all documentation for this point (8)                 Point: Precautions (Done)     Learning Progress Summary           Patient Acceptance, E, VU by CL at 8/3/2022 1338      Show all documentation for this point (8)                             User Key     Initials Effective Dates Name Provider Type Discipline     06/16/21 -  Chloé Gudino, RN Registered Nurse Nurse              PT Recommendation and Plan  Anticipated Discharge Disposition (PT): home with assist, inpatient rehabilitation facility  Planned Therapy Interventions (PT): balance training, bed mobility training, gait training, home exercise program, neuromuscular re-education, patient/family education, strengthening, transfer training  Therapy Frequency (PT): 3 times/wk (3-5x/week)  Progress Summary (PT)  Progress Toward Functional  Goals (PT): progress toward functional goals is fair, progress toward functional goals is gradual  Daily Progress Summary (PT): Pt w/ improved standing mobility only limited by O2 sats and SOA this day.  Plan of Care Reviewed With: patient  Progress: improving  Outcome Evaluation: Pt w/ improved mobility at bedside but limited standing mobility.       Time Calculation:    PT Charges     Row Name 08/03/22 1425             Time Calculation    PT Received On 08/03/22  -CS      PT Goal Re-Cert Due Date 08/11/22  -CS              Timed Charges    00270 - Gait Training Minutes  15  -CS      64287 - PT Therapeutic Activity Minutes 15  -CS              Total Minutes    Timed Charges Total Minutes 30  -CS       Total Minutes 30  -CS            User Key  (r) = Recorded By, (t) = Taken By, (c) = Cosigned By    Initials Name Provider Type    CS Kaiser Morley, PT Physical Therapist              Therapy Charges for Today     Code Description Service Date Service Provider Modifiers Qty    50424040066 HC GAIT TRAINING EA 15 MIN 8/3/2022 Kaiser Morley, PT GP 1    [id number]  PT THERAPEUTIC ACT EA 15 MIN 8/3/2022 Kaiser Morley, PT GP 1               Kaiser Morley PT  8/3/2022

## 2022-08-03 NOTE — PROGRESS NOTES
Psychiatric HOSPITALIST PROGRESS NOTE     Patient Identification:  Name:  Emilio Guy  Age:  56 y.o.  Sex:  male  :  1965  MRN:  [id number]  Visit Number:  [id number]  ROOM: P210/S2     Primary Care Provider:  Valarie Garcia APRN    Length of stay in inpatient status:  9    Subjective     Chief Compliant:    Chief Complaint   Patient presents with   • Altered Mental Status     History of Presenting Illness:    Patient remains ill but stable today, no acute events overnight, no new complaints, denies any fevers or chills, reports his breathing is improving, have weaned down to 3LNC today which Nurse reports is his home requirement, reports wheezing has improved, on oral steroids and weaning per Pulmonary, has diuresed well and creatinine stable, had transitioned to oral lasix and still -500cc overnight, this AM blood pressure lower and holding lasix for now, remains weak, working with therapy today, Social Work to assist with placement as for now patient is amenable to rehab. Patient deferred MRI, TeleNeurology with formal recommendations to continue DAPT.    Objective     Current Hospital Meds:  albuterol sulfate HFA, 2 puff, Inhalation, 4x Daily - RT  aspirin, 81 mg, Oral, Daily  atorvastatin, 80 mg, Oral, Nightly  budesonide-formoterol, 2 puff, Inhalation, BID - RT  cetirizine, 10 mg, Oral, Daily  clopidogrel, 75 mg, Oral, Daily  enoxaparin, 40 mg, Subcutaneous, Nightly  fluticasone, 1 spray, Nasal, Daily  furosemide, 40 mg, Oral, Daily  guaiFENesin, 1,200 mg, Oral, BID  ipratropium-albuterol, 3 mL, Nebulization, 4x Daily - RT  metoprolol tartrate, 25 mg, Oral, Q12H  nicotine, 1 patch, Transdermal, Q24H  nystatin, , Topical, Q12H  pantoprazole, 40 mg, Oral, QAM AC  predniSONE, 40 mg, Oral, Daily With Breakfast  senna-docusate sodium, 2 tablet, Oral, BID  sodium chloride, 10 mL, Intravenous, Q12H  sodium chloride, 10 mL, Intravenous,  Q12H    ----------------------------------------------------------------------------------------------------------------------  Vital Signs:  Temp:  [96 °F (35.6 °C)-98.2 °F (36.8 °C)] 97.1 °F (36.2 °C)  Heart Rate:  [57-78] 74  Resp:  [13-25] 14  BP: ()/(59-91) 93/59  SpO2:  [91 %-100 %] 97 %  on  Flow (L/min):  [3-4] 3;   Device (Oxygen Therapy): humidified;nasal cannula  Body mass index is 23.6 kg/m².      Intake/Output Summary (Last 24 hours) at 8/3/2022 1003  Last data filed at 8/3/2022 0800  Gross per 24 hour   Intake 1170 ml   Output 1950 ml   Net -780 ml      ----------------------------------------------------------------------------------------------------------------------  Physical exam:  Constitutional: older than stated age, No acute distress.      HENT:  Head:  Normocephalic and atraumatic.  Mouth:  Moist mucous membranes.    Eyes:  Conjunctivae and EOM are normal. No scleral icterus.    Neck:  Neck supple.  No JVD present.    Cardiovascular:  Normal rate, regular rhythm and normal heart sounds with no murmur.  Pulmonary/Chest:  Chronic respiratory distress, trace wheezes, no crackles, have turned down to 3LNC now.  Abdominal:  Soft. No distension and no tenderness.   Musculoskeletal:  No tenderness, and no deformity.   Neurological:  Alert and oriented to person, place, and time.  No gross focal deficits   Skin:  Skin is warm and dry. No rash noted. No pallor.   Peripheral vascular:  No clubbing, no cyanosis, no edema.  Psychiatric: Appropriate mood and affect    Edited by: Jose M Mayer MD at 8/3/2022 1003  ----------------------------------------------------------------------------------------------------------------------  CBC - WBC/Hgb/Hct/Plts: 14.31*/13.3/40.6/257 (08/03 0059)  CHEM - BUN/Cr/glu/ALT/AST/amyl/lip:20/0.55*/103*/24/20/--/-- (08/03 0059)  LYTES - Na/K/Cl/CO2: 136/3.7/94*/33.7* (08/03 0059)     No results found for: URINECX  No results found for: BLOODCX    I have personally  looked at the labs and they are summarized above.  ----------------------------------------------------------------------------------------------------------------------  Detailed radiology reports for the last 24 hours:  US Venous Doppler Lower Extremity Bilateral (duplex)    Result Date: 8/2/2022  No sonographic findings of DVT in the lower extremities  This report was finalized on 8/2/2022 2:23 PM by Dr. Manoj Reyes II, MD.      Assessment & Plan    56M PMH chronic respiratory failure on 3 L nasal cannula at home due to COPD who presented with worsening shortness of breath and diagnosed with an acute exacerbation of COPD.     #Sepsis (Not POA), Acute Metabolic Encephalopathy (POA), Acute Hypoxic/Hypercarbic on Chronic Hypoxic Respiratory Failure requiring Non-invasive Positive Pressure Ventilation due Pneumonia, Bacterial, treating for Community Acquired in setting of COPD/Emphysema with Acute Exacerbation, though also noted new Coronavirus 19 + and unknown clinical significance though treated with antiviral medication  #Pulmonary Adenopathy, largest 1.4cm  - Patient presented with increased 02 requirement from home, was on 3L but required Bipap now on 5LNC, did not initially meet sepsis criteria but later with worsening WBC count and heart rate > 90, notably with increased dyspnea, work of breathing, wheezing, imaging concern for bacterial Pneumonia though also with new Coronavirus 19 diagnosis. Patient also known to prior MD and suspected home trilogy nonadherence contributing to illness.   - Admission labs showed 10K->12K, 2.09, lactate 0.7, procalcitonin 0.06, d-dimer 1.21, covid +, strep pneumo and legionella negative, blood cultures negative to date, ABG pH down to 7.2, PC02 up to 91, P02 down to 46.   - Bilateral Venous Duplex negative for DVTs  - CTPE showed no Pulmonary Embolism, emphysema with chronic changes bilateral lungs, mild Pneumonia RLL and minimal patchy infiltrate RUL as well, adenopathy  suspected reactive, recommended 3m follow up chest CT  - Infectious Disease not consulted per prior MD  - Pulmonary consulted and following, recommended weaning steroids over the week  - Palliative Care consulted and following for goals of care conversations,  consulted for spiritual care  - Has completed 7 days Ceftriaxone/Cefepime and Doxycycline, has completed 5 days Remdesivir  - Continue oral Prednisone, tapering  - Continue Level I anticoagulation for Coronavirus 19 thromboembolism prophylaxis  - Continue Tylenol as needed for fevers  - Continue home Symbicort, Cetirizine, new Albuterol Inhaler, scheduled duonebs  - Trend Coronavirus 19 progression labs with CBC, CMP, CRP, D-dimer daily   - Monitor in PCU, enhanced droplet/contact isolation precautions, continue 02 but wean as able      #Subacute MCA stroke in setting of Chronic appearing left cerebellar infarction  - Imaging abnormalities noted on CT, TeleNeurology consulted with evaluation 7/26, felt likely watershed secondary to significant supraclinoid ICA stenosis. Risk factors include hypertension, tobacco abuse, alcohol abuse  - Continue Aspirin 81, statin, plavix   - PT/OT following, monitor on telemetry  - Patient deferred MRI reporting he could not lay flat or be in confined space, TeleNeurology has made final recommendations and signed off 8/2     #Hypertension  #Chronic HFpEF  - Labs showed troponins < 0.010, proBNP 710  - EKG showed normal sinus rhythm, nonspecific ST changes  - Echocardiogram showed LVEF 66-70%, diastolic dysfunction  - Continue home Aspirin 81, Statin  - Continue home Beta Blocker, titrate as indicated  - No home ACEI/ARB/ARNI, add as indicated, will consider adding low dose lisinopril pending blood pressure and creatinine trend  - Holding diuretic today due to lower blood pressure, resume as indicated   - Continue to monitor on telemetry, strict I/O's, trend heart rate and blood pressure      #Chronic alcohol use  -  Drinks 3-4 beers per day.  No history of withdrawal. continue CIWA.     #Gastroesophageal Reflux Disease  - Continue home PPI     #Anxiety/Depression  - Continue home regimen     #Constipation  - Continue Doc-Senna, Miralax     #Tobacco Dependence  - Recommended cessation, nicotine replacement therapy as needed     #Medical Non-Adherence  - Given patient's choices and actions to not adhere to medical treatments and recommendations, this will drastically affect their short and long term morbidity and mortality.       F: Oral  E: Monitor & Replace PRN  N: Diet Soft Texture; Chopped   PPx: Prophylactic Lovenox (Level I)  Code Status (Patient has no pulse and is not breathing): No CPR (Do Not Attempt to Resuscitate)  Medical Interventions (Patient has pulse or is breathing): Limited Support  Medical Intervention Limits: NO intubation (DNI)     Dispo: Pending workup and clinical improvement, PT/OT consulted and following, Social Work to assist with inpatient rehab placement, though will need to be out of Coronavirus isolation reportedly.      *This patient is considered high risk due to sepsis, Acute Metabolic Encephalopathy, acute respiratory failure, Coronavirus 19, monitored for drug toxicities on Remdesivir.     Edited by: Jose M Mayer MD at 8/3/2022 [facility]  [facility]

## 2022-08-03 NOTE — PLAN OF CARE
Goal Outcome Evaluation:           Progress: no change  Outcome Evaluation: Pt a&o x4. continued on [facility] soft thin liquid diet. NS. 4L NC. Ciwa protocol not utilized this shift. Neuro q4hrs. Pt resting in bed with no complaints. Call light in reach with bed low, locked, and alarmed.

## 2022-08-03 NOTE — CASE MANAGEMENT/SOCIAL WORK
Discharge Planning Assessment  [facility]     Patient Name: Emilio Guy  MRN: [id number]  Today's Date: 8/3/2022    Admit Date: 7/24/2022     Discharge Plan     Row Name 08/03/22 0859       Plan    Plan Pt admitted 7/24/22. Inpatient rehab continues to follow pt, will be out of covid isolation 8/14/22. SS following.              Continued Care and Services - Admitted Since 7/24/2022     Destination     Service Provider Request Status Selected Services Address Phone Fax Patient Preferred    [facility]  Pending - No Request Sent N/A [address] [facility] [id number] [phone] --              GARY Austin

## 2022-08-04 LAB
ALBUMIN SERPL-MCNC: 3.46 G/DL (ref 3.5–5.2)
ALBUMIN/GLOB SERPL: 2 G/DL
ALP SERPL-CCNC: 50 U/L (ref 39–117)
ALT SERPL W P-5'-P-CCNC: 32 U/L (ref 1–41)
ANION GAP SERPL CALCULATED.3IONS-SCNC: 5.1 MMOL/L (ref 5–15)
AST SERPL-CCNC: 23 U/L (ref 1–40)
BASOPHILS # BLD AUTO: 0.03 10*3/MM3 (ref 0–0.2)
BASOPHILS NFR BLD AUTO: 0.2 % (ref 0–1.5)
BILIRUB SERPL-MCNC: 0.2 MG/DL (ref 0–1.2)
BUN SERPL-MCNC: 22 MG/DL (ref 6–20)
BUN/CREAT SERPL: 43.1 (ref 7–25)
CALCIUM SPEC-SCNC: 8.8 MG/DL (ref 8.6–10.5)
CHLORIDE SERPL-SCNC: 99 MMOL/L (ref 98–107)
CO2 SERPL-SCNC: 31.9 MMOL/L (ref 22–29)
CREAT SERPL-MCNC: 0.51 MG/DL (ref 0.76–1.27)
DEPRECATED RDW RBC AUTO: 45.2 FL (ref 37–54)
EGFRCR SERPLBLD CKD-EPI 2021: 119.7 ML/MIN/1.73
EOSINOPHIL # BLD AUTO: 0.11 10*3/MM3 (ref 0–0.4)
EOSINOPHIL NFR BLD AUTO: 0.7 % (ref 0.3–6.2)
ERYTHROCYTE [DISTWIDTH] IN BLOOD BY AUTOMATED COUNT: 12.8 % (ref 12.3–15.4)
GLOBULIN UR ELPH-MCNC: 1.7 GM/DL
GLUCOSE SERPL-MCNC: 105 MG/DL (ref 65–99)
HCT VFR BLD AUTO: 39.4 % (ref 37.5–51)
HGB BLD-MCNC: 12.5 G/DL (ref 13–17.7)
IMM GRANULOCYTES # BLD AUTO: 0.21 10*3/MM3 (ref 0–0.05)
IMM GRANULOCYTES NFR BLD AUTO: 1.4 % (ref 0–0.5)
LYMPHOCYTES # BLD AUTO: 3.31 10*3/MM3 (ref 0.7–3.1)
LYMPHOCYTES NFR BLD AUTO: 22.2 % (ref 19.6–45.3)
MCH RBC QN AUTO: 30.6 PG (ref 26.6–33)
MCHC RBC AUTO-ENTMCNC: 31.7 G/DL (ref 31.5–35.7)
MCV RBC AUTO: 96.6 FL (ref 79–97)
MONOCYTES # BLD AUTO: 1.77 10*3/MM3 (ref 0.1–0.9)
MONOCYTES NFR BLD AUTO: 11.9 % (ref 5–12)
NEUTROPHILS NFR BLD AUTO: 63.6 % (ref 42.7–76)
NEUTROPHILS NFR BLD AUTO: 9.49 10*3/MM3 (ref 1.7–7)
NRBC BLD AUTO-RTO: 0 /100 WBC (ref 0–0.2)
PLATELET # BLD AUTO: 230 10*3/MM3 (ref 140–450)
PMV BLD AUTO: 11 FL (ref 6–12)
POTASSIUM SERPL-SCNC: 4.2 MMOL/L (ref 3.5–5.2)
PROT SERPL-MCNC: 5.2 G/DL (ref 6–8.5)
RBC # BLD AUTO: 4.08 10*6/MM3 (ref 4.14–5.8)
SODIUM SERPL-SCNC: 136 MMOL/L (ref 136–145)
WBC NRBC COR # BLD: 14.92 10*3/MM3 (ref 3.4–10.8)

## 2022-08-04 PROCEDURE — 80053 COMPREHEN METABOLIC PANEL: CPT | Performed by: STUDENT IN AN ORGANIZED HEALTH CARE EDUCATION/TRAINING PROGRAM

## 2022-08-04 PROCEDURE — 94660 CPAP INITIATION&MGMT: CPT

## 2022-08-04 PROCEDURE — 97116 GAIT TRAINING THERAPY: CPT

## 2022-08-04 PROCEDURE — 99232 SBSQ HOSP IP/OBS MODERATE 35: CPT | Performed by: INTERNAL MEDICINE

## 2022-08-04 PROCEDURE — 94799 UNLISTED PULMONARY SVC/PX: CPT

## 2022-08-04 PROCEDURE — 97535 SELF CARE MNGMENT TRAINING: CPT

## 2022-08-04 PROCEDURE — 97530 THERAPEUTIC ACTIVITIES: CPT

## 2022-08-04 PROCEDURE — 85025 COMPLETE CBC W/AUTO DIFF WBC: CPT | Performed by: STUDENT IN AN ORGANIZED HEALTH CARE EDUCATION/TRAINING PROGRAM

## 2022-08-04 PROCEDURE — 94761 N-INVAS EAR/PLS OXIMETRY MLT: CPT

## 2022-08-04 PROCEDURE — 63710000001 PREDNISONE PER 1 MG: Performed by: INTERNAL MEDICINE

## 2022-08-04 PROCEDURE — 25010000002 ENOXAPARIN PER 10 MG: Performed by: STUDENT IN AN ORGANIZED HEALTH CARE EDUCATION/TRAINING PROGRAM

## 2022-08-04 RX ORDER — PREDNISONE 20 MG/1
20 TABLET ORAL
Status: DISCONTINUED | OUTPATIENT
Start: 2022-08-05 | End: 2022-08-09

## 2022-08-04 RX ADMIN — BUDESONIDE AND FORMOTEROL FUMARATE DIHYDRATE 2 PUFF: 160; 4.5 AEROSOL RESPIRATORY (INHALATION) at 06:58

## 2022-08-04 RX ADMIN — ATORVASTATIN CALCIUM 80 MG: 40 TABLET, FILM COATED ORAL at 21:55

## 2022-08-04 RX ADMIN — ALBUTEROL SULFATE 2 PUFF: 90 AEROSOL, METERED RESPIRATORY (INHALATION) at 13:16

## 2022-08-04 RX ADMIN — DOCUSATE SODIUM 50 MG AND SENNOSIDES 8.6 MG 2 TABLET: 8.6; 5 TABLET, FILM COATED ORAL at 10:59

## 2022-08-04 RX ADMIN — FLUTICASONE PROPIONATE 1 SPRAY: 50 SPRAY, METERED NASAL at 10:30

## 2022-08-04 RX ADMIN — ENOXAPARIN SODIUM 40 MG: 40 INJECTION SUBCUTANEOUS at 21:55

## 2022-08-04 RX ADMIN — ALBUTEROL SULFATE 2 PUFF: 90 AEROSOL, METERED RESPIRATORY (INHALATION) at 18:47

## 2022-08-04 RX ADMIN — ASPIRIN 81 MG: 81 TABLET, CHEWABLE ORAL at 11:02

## 2022-08-04 RX ADMIN — PREDNISONE 40 MG: 20 TABLET ORAL at 10:57

## 2022-08-04 RX ADMIN — ALBUTEROL SULFATE 2 PUFF: 90 AEROSOL, METERED RESPIRATORY (INHALATION) at 06:58

## 2022-08-04 RX ADMIN — BUDESONIDE AND FORMOTEROL FUMARATE DIHYDRATE 2 PUFF: 160; 4.5 AEROSOL RESPIRATORY (INHALATION) at 18:47

## 2022-08-04 RX ADMIN — ALBUTEROL SULFATE 2 PUFF: 90 AEROSOL, METERED RESPIRATORY (INHALATION) at 01:22

## 2022-08-04 RX ADMIN — Medication 10 ML: at 11:02

## 2022-08-04 RX ADMIN — NICOTINE TRANSDERMAL SYSTEM 1 PATCH: 21 PATCH, EXTENDED RELEASE TRANSDERMAL at 10:57

## 2022-08-04 RX ADMIN — CLOPIDOGREL 75 MG: 75 TABLET, FILM COATED ORAL at 10:58

## 2022-08-04 RX ADMIN — GUAIFENESIN 1200 MG: 600 TABLET, EXTENDED RELEASE ORAL at 10:59

## 2022-08-04 RX ADMIN — CETIRIZINE HYDROCHLORIDE 10 MG: 10 TABLET, FILM COATED ORAL at 10:59

## 2022-08-04 RX ADMIN — Medication 10 ML: at 21:55

## 2022-08-04 RX ADMIN — NYSTATIN: 100000 POWDER TOPICAL at 17:40

## 2022-08-04 RX ADMIN — DOCUSATE SODIUM 50 MG AND SENNOSIDES 8.6 MG 2 TABLET: 8.6; 5 TABLET, FILM COATED ORAL at 21:55

## 2022-08-04 RX ADMIN — Medication 10 ML: at 11:03

## 2022-08-04 RX ADMIN — NYSTATIN 1 APPLICATION: 100000 POWDER TOPICAL at 22:00

## 2022-08-04 RX ADMIN — GUAIFENESIN 1200 MG: 600 TABLET, EXTENDED RELEASE ORAL at 21:54

## 2022-08-04 NOTE — CASE MANAGEMENT/SOCIAL WORK
Discharge Planning Assessment  [facility]     Patient Name: Emilio Guy  MRN: [id number]  Today's Date: 8/4/2022    Admit Date: 7/24/2022                   Discharge Plan     Row Name 08/04/22 1306       Plan    Plan Pt was admitted on 07/24/22.  IPR per Crystal states IPR continues to follow patient. SS to follow.                MELIA Wilson

## 2022-08-04 NOTE — PLAN OF CARE
Problem: Adult Inpatient Plan of Care  Goal: Plan of Care Review  Outcome: Ongoing, Progressing  Flowsheets (Taken 8/3/2022 1336 by Chloé Gudino, RN)  Progress: improving  Plan of Care Reviewed With: patient  Outcome Evaluation: Alert oriented x 4.  Resting comfortably denies pain.  Ambulated in room with physical therapy.  Large BM.  3 liters nasal cannual at present.  Continue to monitor.  Goal: Patient-Specific Goal (Individualized)  Outcome: Ongoing, Progressing  Goal: Absence of Hospital-Acquired Illness or Injury  Outcome: Ongoing, Progressing  Intervention: Identify and Manage Fall Risk  Flowsheets  Taken 8/4/2022 0000 by Lorri Driver PCT  Safety Promotion/Fall Prevention:   activity supervised   assistive device/personal items within reach   clutter free environment maintained   fall prevention program maintained   nonskid shoes/slippers when out of bed   room organization consistent   safety round/check completed  Taken 8/3/2022 2153 by Karissa Hays RN  Safety Promotion/Fall Prevention: activity supervised  Taken 8/3/2022 2100 by Karissa Hays RN  Safety Promotion/Fall Prevention: activity supervised  Taken 8/3/2022 1900 by Karissa Hays RN  Safety Promotion/Fall Prevention: activity supervised  Intervention: Prevent Skin Injury  Flowsheets  Taken 8/4/2022 0000 by Lorri Driver PCT  Body Position: position changed independently  Taken 8/3/2022 2153 by Karissa Hays RN  Skin Protection: adhesive use limited  Intervention: Prevent and Manage VTE (Venous Thromboembolism) Risk  Flowsheets  Taken 8/4/2022 0000 by Lorri Driver PCT  Activity Management: activity adjusted per tolerance  Taken 8/3/2022 1900 by Karissa Hays RN  Activity Management: activity adjusted per tolerance  Taken 8/3/2022 0730 by Chloé Gudino, RN  VTE Prevention/Management: (see MAR) other (see comments)  Taken 8/2/2022 2000 by Kailey Kirkland RN  Range of Motion: ROM (range of motion)  performed  Intervention: Prevent Infection  Flowsheets (Taken 8/4/2022 0000 by Lorri Driver, PCT)  Infection Prevention: rest/sleep promoted  Goal: Optimal Comfort and Wellbeing  Outcome: Ongoing, Progressing  Intervention: Monitor Pain and Promote Comfort  Flowsheets (Taken 8/1/2022 0838 by Beatriz Lucio, RN)  Pain Management Interventions:   care clustered   pillow support provided  Intervention: Provide Person-Centered Care  Flowsheets (Taken 8/3/2022 0730 by Chloé Gudino, RN)  Trust Relationship/Rapport:   care explained   thoughts/feelings acknowledged  Goal: Readiness for Transition of Care  Outcome: Ongoing, Progressing  Intervention: Mutually Develop Transition Plan  Flowsheets  Taken 8/3/2022 0845 by Kaiser Morley PT  Equipment Currently Used at Home: ([employer] for Cane)   cane, quad   oxygen   nebulizer   other (see comments)  Taken 7/26/2022 0952 by Lexy Nieves RN  Discharge Facility/Level of Care Needs: (Mother requesting SNF or Rehab at d/c if qualifies) other (see comments)  Equipment Needed After Discharge: none  Anticipated Changes Related to Illness: (Mother reports pt has had more difficulty with mobility for the past two weeks prior to admission) other (see comments)  Concerns Comments: Mother requesting SNF or Rehab if pt qualifies  Transportation Anticipated: (Mother will provide if d/c'd home) family or friend will provide  Transportation Concerns: none  Current Discharge Risk: (Mother reports pt with decreased mobility for 2 wks prior to admission.)   chronically ill   other (see comments)  Concerns to be Addressed: discharge planning  Readmission Within the Last 30 Days: no previous admission in last 30 days  Patient/Family Anticipates Transition to: (Mother request SNF or Rehab if pt qualifies) other (see comments)  Taken 7/25/2022 1810 by Earline Zabala, RN  Patient/Family Anticipated Services at Transition: none     Problem: COPD (Chronic Obstructive  Pulmonary Disease) Comorbidity  Goal: Maintenance of COPD Symptom Control  Outcome: Ongoing, Progressing  Intervention: Maintain COPD-Symptom Control  Flowsheets  Taken 8/3/2022 2153  Medication Review/Management: medications reviewed  Taken 8/3/2022 2100  Medication Review/Management: medications reviewed  Taken 8/3/2022 1900  Medication Review/Management: medications reviewed     Problem: Skin Injury Risk Increased  Goal: Skin Health and Integrity  Outcome: Ongoing, Progressing  Intervention: Optimize Skin Protection  Flowsheets  Taken 8/4/2022 0000 by Lorri Driver PCT  Head of Bed (HOB) Positioning: HOB elevated  Taken 8/3/2022 2153 by Karissa Hays RN  Pressure Reduction Techniques: frequent weight shift encouraged  Pressure Reduction Devices: pressure-redistributing mattress utilized  Skin Protection: adhesive use limited     Problem: Gas Exchange Impaired  Goal: Optimal Gas Exchange  Outcome: Ongoing, Progressing  Intervention: Optimize Oxygenation and Ventilation  Flowsheets (Taken 8/4/2022 0000 by Lorri Driver PCT)  Head of Bed (HOB) Positioning: HOB elevated     Problem: Thought Process Alteration  Goal: Optimal Thought Clarity  Outcome: Ongoing, Progressing  Intervention: Minimize Safety Risk and Altered Thought  Flowsheets  Taken 8/4/2022 0000 by Lorri Driver PCT  Enhanced Safety Measures: bed alarm refused  Taken 8/3/2022 2153 by Karissa Hays RN  Enhanced Safety Measures: bed alarm set  Taken 8/3/2022 2100 by Karissa Hays RN  Enhanced Safety Measures: bed alarm set  Taken 8/3/2022 1900 by Karissa Hays RN  Enhanced Safety Measures: bed alarm set     Problem: Fall Injury Risk  Goal: Absence of Fall and Fall-Related Injury  Outcome: Ongoing, Progressing  Intervention: Identify and Manage Contributors  Flowsheets  Taken 8/3/2022 2153  Medication Review/Management: medications reviewed  Taken 8/3/2022 2100  Medication Review/Management: medications reviewed  Taken  8/3/2022 1900  Medication Review/Management: medications reviewed  Intervention: Promote Injury-Free Environment  Flowsheets  Taken 8/4/2022 0000 by Lorri Driver, PCT  Safety Promotion/Fall Prevention:   activity supervised   assistive device/personal items within reach   clutter free environment maintained   fall prevention program maintained   nonskid shoes/slippers when out of bed   room organization consistent   safety round/check completed  Taken 8/3/2022 2153 by Karissa Hays, RN  Safety Promotion/Fall Prevention: activity supervised  Taken 8/3/2022 2100 by Karissa Hays, RN  Safety Promotion/Fall Prevention: activity supervised  Taken 8/3/2022 1900 by Karissa Hays, RN  Safety Promotion/Fall Prevention: activity supervised   Goal Outcome Evaluation:

## 2022-08-04 NOTE — THERAPY TREATMENT NOTE
Acute Care - Occupational Therapy Treatment Note   [facility]     Patient Name: Emilio Guy  : 1965  MRN: [id number]  Today's Date: 2022  Onset of Illness/Injury or Date of Surgery: 22     Referring Physician: Adelso    Admit Date: 2022       ICD-10-CM ICD-9-CM   1. Acute respiratory failure with hypoxia and hypercapnia (HCC)  J96.01 518.81    J96.02    2. COVID-19  U07.1 079.89   3. Cerebrovascular accident (CVA), unspecified mechanism (HCC)  I63.9 434.91     Patient Active Problem List   Diagnosis   • COPD with acute exacerbation (HCC)   • Acute on chronic respiratory failure with hypoxia (HCC)   • Acute respiratory failure with hypoxia and hypercapnia (HCC)     Past Medical History:   Diagnosis Date   • CHF (congestive heart failure) (HCC)    • COPD (chronic obstructive pulmonary disease) (HCC)      No past surgical history on file.      OT ASSESSMENT FLOWSHEET (last 12 hours)     OT Evaluation and Treatment     Row Name 22 1545                   OT Time and Intention    Subjective Information complains of;weakness;fatigue  -LM        Document Type therapy note (daily note)  -LM        Mode of Treatment occupational therapy  -LM        Patient Effort good  -LM        Comment totolerance.  Patient performing badl with moderate assist overall.  setup with feeding and grooming.  decreased fxl activity tolerance.  will benefit from continued skilled ot services.  BUE arom wfl.  alert and oreinted x 3.  -LM                  General Information    Existing Precautions/Restrictions seizures;fall  -LM                  Cognition    Orientation Status (Cognition) oriented x 4  -LM                  Positioning and Restraints    Post Treatment Position bed  -LM        In Bed call light within reach;encouraged to call for assist;exit alarm on  -LM              User Key  (r) = Recorded By, (t) = Taken By, (c) = Cosigned By    Initials Name Effective Dates    LM Sanna Garces, OT 21 -                         OT Recommendation and Plan  Therapy Frequency (OT): other (see comments) (prn and/or to monitor fxl progress)  Plan of Care Review  Plan of Care Reviewed With: patient  Plan of Care Reviewed With: patient        Time Calculation:    Time Calculation- OT     Row Name 08/04/22 1310             Timed Charges    27043 - Gait Training Minutes  15  -CS              Total Minutes    Timed Charges Total Minutes 15  -CS       Total Minutes 15  -CS            User Key  (r) = Recorded By, (t) = Taken By, (c) = Cosigned By    Initials Name Provider Type    Kaiser Hays, PT Physical Therapist              Therapy Charges for Today     Code Description Service Date Service Provider Modifiers Qty    [id number] HC OT SELF CARE/MGMT/TRAIN EA 15 MIN 8/4/2022 Sanna Garces, OT GO 1               Sanna Garces OT  8/4/2022

## 2022-08-04 NOTE — PROGRESS NOTES
"    [facility] HOSPITALIST PROGRESS NOTE     Patient Identification:  Name:  Emilio Guy  Age:  56 y.o.  Sex:  male  :  1965  MRN:  [id number]  Visit Number:  [id number]  ROOM: [address] [place] [address]     Primary Care Provider:  Valarie Garcia APRN    Length of stay in inpatient status:  10    Subjective     Chief Compliant:    Chief Complaint   Patient presents with   • Altered Mental Status     History of Presenting Illness:    Patient remains ill but stable today, no acute events overnight, no new complaints, denies any fevers or chills, endorses improved breathing and wheezing, endorses continued \"fogginess\", discussed effects of covid but also weaning steroids which could be contributing, holding diuretics, pending inpatient rehab, will discuss at multidisciplinary rounds today.   Objective     Current Hospital Meds:  albuterol sulfate HFA, 2 puff, Inhalation, 4x Daily - RT  aspirin, 81 mg, Oral, Daily  atorvastatin, 80 mg, Oral, Nightly  budesonide-formoterol, 2 puff, Inhalation, BID - RT  cetirizine, 10 mg, Oral, Daily  clopidogrel, 75 mg, Oral, Daily  enoxaparin, 40 mg, Subcutaneous, Nightly  fluticasone, 1 spray, Nasal, Daily  guaiFENesin, 1,200 mg, Oral, BID  metoprolol tartrate, 25 mg, Oral, Q12H  nicotine, 1 patch, Transdermal, Q24H  nystatin, , Topical, Q12H  pantoprazole, 40 mg, Oral, QAM AC  [START ON 2022] predniSONE, 20 mg, Oral, Daily With Breakfast  senna-docusate sodium, 2 tablet, Oral, BID  sodium chloride, 10 mL, Intravenous, Q12H  sodium chloride, 10 mL, Intravenous, Q12H         ----------------------------------------------------------------------------------------------------------------------  Vital Signs:  Temp:  [95.7 °F (35.4 °C)-98.2 °F (36.8 °C)] 98.2 °F (36.8 °C)  Heart Rate:  [66-92] 80  Resp:  [18-20] 18  BP: (100-148)/(61-87) 100/61  SpO2:  [88 %-99 %] 96 %  on  Flow (L/min):  [2-4] 3.5;   Device (Oxygen Therapy): nasal cannula  Body mass index is 24.01 " kg/m².      Intake/Output Summary (Last 24 hours) at 8/4/2022 1108  Last data filed at 8/4/2022 1043  Gross per 24 hour   Intake 1080 ml   Output 1270 ml   Net -190 ml      ----------------------------------------------------------------------------------------------------------------------  Physical exam:  Constitutional: older than stated age, No acute distress.      HENT:  Head:  Normocephalic and atraumatic.  Mouth:  Moist mucous membranes.    Eyes:  Conjunctivae and EOM are normal. No scleral icterus.    Neck:  Neck supple.  No JVD present.    Cardiovascular:  Normal rate, regular rhythm and normal heart sounds with no murmur.  Pulmonary/Chest:  Chronic respiratory distress, no wheezes, on 3LNC  Abdominal:  Soft. No distension and no tenderness.   Musculoskeletal:  No tenderness, and no deformity.   Neurological:  Alert and oriented to person, place, and time.  No gross focal deficits   Skin:  Skin is warm and dry. No rash noted. No pallor.   Peripheral vascular:  No clubbing, no cyanosis, no edema.  Psychiatric: Appropriate mood and affect    *Examination stable overall today 8/4  Edited by: Jose M Mayer MD at 8/4/2022 1108  ----------------------------------------------------------------------------------------------------------------------  CBC - WBC/Hgb/Hct/Plts: 14.92*/12.5*/39.4/230 (08/04 0032)  CHEM - BUN/Cr/glu/ALT/AST/amyl/lip:22*/0.51*/105*/32/23/--/-- (08/04 0032)  LYTES - Na/K/Cl/CO2: 136/4.2/99/31.9* (08/04 0032)     No results found for: URINECX  No results found for: BLOODCX    I have personally looked at the labs and they are summarized above.  ----------------------------------------------------------------------------------------------------------------------  Detailed radiology reports for the last 24 hours:  US Venous Doppler Lower Extremity Bilateral (duplex)    Result Date: 8/2/2022  No sonographic findings of DVT in the lower extremities  This report was finalized on 8/2/2022 2:23 PM  by Dr. Manoj Reyes II, MD.      Assessment & Plan    56M PMH chronic respiratory failure on 3 L nasal cannula at home due to COPD who presented with worsening shortness of breath and diagnosed with an acute exacerbation of COPD.     #Sepsis (Not POA), Acute Metabolic Encephalopathy (POA), Acute Hypoxic/Hypercarbic on Chronic Hypoxic Respiratory Failure requiring Non-invasive Positive Pressure Ventilation due Pneumonia, Bacterial, treating for Community Acquired in setting of COPD/Emphysema with Acute Exacerbation, though also noted new Coronavirus 19 + and unknown clinical significance though treated with antiviral medication  #Pulmonary Adenopathy, largest 1.4cm  - Patient presented with increased 02 requirement from home, was on 3L but required Bipap now on 5LNC, did not initially meet sepsis criteria but later with worsening WBC count and heart rate > 90, notably with increased dyspnea, work of breathing, wheezing, imaging concern for bacterial Pneumonia though also with new Coronavirus 19 diagnosis. Patient also known to prior MD and suspected home trilogy nonadherence contributing to illness.   - Admission labs showed 10K->12K, 2.09, lactate 0.7, procalcitonin 0.06, d-dimer 1.21, covid +, strep pneumo and legionella negative, blood cultures negative to date, ABG pH down to 7.2, PC02 up to 91, P02 down to 46.   - Bilateral Venous Duplex negative for DVTs  - CTPE showed no Pulmonary Embolism, emphysema with chronic changes bilateral lungs, mild Pneumonia RLL and minimal patchy infiltrate RUL as well, adenopathy suspected reactive, recommended 3m follow up chest CT  - Infectious Disease not consulted per prior MD  - Pulmonary consulted and following though not here through the weekend  - Palliative Care consulted and following for goals of care conversations,  consulted for spiritual care  - Has completed 7 days Ceftriaxone/Cefepime and Doxycycline, has completed 5 days Remdesivir  - Continue oral  Prednisone, tapering down to 20mg daily tomorrow  - Continue Level I anticoagulation for Coronavirus 19 thromboembolism prophylaxis  - Continue Tylenol as needed for fevers  - Continue home Symbicort, Cetirizine, new Albuterol Inhaler, scheduled duonebs  - Trend Coronavirus 19 progression labs with CBC, CMP, CRP, D-dimer daily   - Monitor on telemetry, continue 02 but wean as able      #Subacute MCA stroke in setting of Chronic appearing left cerebellar infarction  - Imaging abnormalities noted on CT, TeleNeurology consulted with evaluation 7/26, felt likely watershed secondary to significant supraclinoid ICA stenosis. Risk factors include hypertension, tobacco abuse, alcohol abuse  - Patient deferred MRI reporting he could not lay flat or be in confined space, TeleNeurology has made final recommendations and signed off 8/2  - Continue Aspirin 81, statin, plavix   - PT/OT following, monitor on telemetry     #Hypertension  #Chronic HFpEF  - Labs showed troponins < 0.010, proBNP 710  - EKG showed normal sinus rhythm, nonspecific ST changes  - Echocardiogram showed LVEF 66-70%, diastolic dysfunction  - Continue home Aspirin 81, Statin  - Continue home Beta Blocker, titrate as indicated  - No home ACEI/ARB/ARNI, add as indicated  - Further diuresis as needed   - Continue to monitor on telemetry, strict I/O's, trend heart rate and blood pressure      #Chronic alcohol use  - Drinks 3-4 beers per day.  No history of withdrawal. continue CIWA.     #Gastroesophageal Reflux Disease  - Continue home PPI     #Anxiety/Depression  - Continue home regimen     #Constipation  - Continue Doc-Senna, Miralax     #Tobacco Dependence  - Recommended cessation, nicotine replacement therapy as needed     #Medical Non-Adherence  - Given patient's choices and actions to not adhere to medical treatments and recommendations, this will drastically affect their short and long term morbidity and mortality.       F: Oral  E: Monitor & Replace  PRN  N: Diet Soft Texture; Chopped   PPx: Prophylactic Lovenox (Level I)  Code Status (Patient has no pulse and is not breathing): No CPR (Do Not Attempt to Resuscitate)  Medical Interventions (Patient has pulse or is breathing): Limited Support  Medical Intervention Limits: NO intubation (DNI)     Dispo: Pending clinical improvement, PT/OT consulted and following, Social Work to assist with inpatient rehab placement.     *This patient is considered high risk due to sepsis, Acute Metabolic Encephalopathy, acute respiratory failure, Coronavirus 19, monitored for drug toxicities on Remdesivir.     Edited by: Jose M Mayer MD at 8/4/2022 1103  [facility]

## 2022-08-04 NOTE — PLAN OF CARE
Patient resting in the bed, he arrived on the unit duirng this shift. No s/s of distress noted. No complaints. Will continue to monitor and follow care plan.

## 2022-08-04 NOTE — PLAN OF CARE
Goal Outcome Evaluation: Patient has been very pleasant. Compliant with all medication and care. Patient remains on 3.5L/NC, with saturations remaining above 90%. NIHSS was a 4 this shift. Urinal noted at bedside, good output noted. Patient is resting in bed. Will continue with plan of care.

## 2022-08-05 ENCOUNTER — APPOINTMENT (OUTPATIENT)
Dept: GENERAL RADIOLOGY | Facility: HOSPITAL | Age: 57
End: 2022-08-05

## 2022-08-05 LAB
ALBUMIN SERPL-MCNC: 3.29 G/DL (ref 3.5–5.2)
ALBUMIN/GLOB SERPL: 2 G/DL
ALP SERPL-CCNC: 49 U/L (ref 39–117)
ALT SERPL W P-5'-P-CCNC: 42 U/L (ref 1–41)
ANION GAP SERPL CALCULATED.3IONS-SCNC: 7.5 MMOL/L (ref 5–15)
AST SERPL-CCNC: 24 U/L (ref 1–40)
BASOPHILS # BLD AUTO: 0.02 10*3/MM3 (ref 0–0.2)
BASOPHILS NFR BLD AUTO: 0.1 % (ref 0–1.5)
BILIRUB SERPL-MCNC: 0.2 MG/DL (ref 0–1.2)
BUN SERPL-MCNC: 19 MG/DL (ref 6–20)
BUN/CREAT SERPL: 41.3 (ref 7–25)
CALCIUM SPEC-SCNC: 8.6 MG/DL (ref 8.6–10.5)
CHLORIDE SERPL-SCNC: 101 MMOL/L (ref 98–107)
CO2 SERPL-SCNC: 29.5 MMOL/L (ref 22–29)
CREAT SERPL-MCNC: 0.46 MG/DL (ref 0.76–1.27)
DEPRECATED RDW RBC AUTO: 46.3 FL (ref 37–54)
EGFRCR SERPLBLD CKD-EPI 2021: 123.5 ML/MIN/1.73
EOSINOPHIL # BLD AUTO: 0.11 10*3/MM3 (ref 0–0.4)
EOSINOPHIL NFR BLD AUTO: 0.8 % (ref 0.3–6.2)
ERYTHROCYTE [DISTWIDTH] IN BLOOD BY AUTOMATED COUNT: 12.9 % (ref 12.3–15.4)
GLOBULIN UR ELPH-MCNC: 1.6 GM/DL
GLUCOSE SERPL-MCNC: 115 MG/DL (ref 65–99)
HCT VFR BLD AUTO: 36.4 % (ref 37.5–51)
HGB BLD-MCNC: 11.7 G/DL (ref 13–17.7)
IMM GRANULOCYTES # BLD AUTO: 0.23 10*3/MM3 (ref 0–0.05)
IMM GRANULOCYTES NFR BLD AUTO: 1.6 % (ref 0–0.5)
LYMPHOCYTES # BLD AUTO: 2.77 10*3/MM3 (ref 0.7–3.1)
LYMPHOCYTES NFR BLD AUTO: 19 % (ref 19.6–45.3)
MCH RBC QN AUTO: 31.3 PG (ref 26.6–33)
MCHC RBC AUTO-ENTMCNC: 32.1 G/DL (ref 31.5–35.7)
MCV RBC AUTO: 97.3 FL (ref 79–97)
MONOCYTES # BLD AUTO: 1.53 10*3/MM3 (ref 0.1–0.9)
MONOCYTES NFR BLD AUTO: 10.5 % (ref 5–12)
NEUTROPHILS NFR BLD AUTO: 68 % (ref 42.7–76)
NEUTROPHILS NFR BLD AUTO: 9.95 10*3/MM3 (ref 1.7–7)
NRBC BLD AUTO-RTO: 0 /100 WBC (ref 0–0.2)
PLATELET # BLD AUTO: 217 10*3/MM3 (ref 140–450)
PMV BLD AUTO: 11.5 FL (ref 6–12)
POTASSIUM SERPL-SCNC: 3.8 MMOL/L (ref 3.5–5.2)
PROT SERPL-MCNC: 4.9 G/DL (ref 6–8.5)
QT INTERVAL: 408 MS
QTC INTERVAL: 420 MS
RBC # BLD AUTO: 3.74 10*6/MM3 (ref 4.14–5.8)
SODIUM SERPL-SCNC: 138 MMOL/L (ref 136–145)
TROPONIN T SERPL-MCNC: <0.01 NG/ML (ref 0–0.03)
WBC NRBC COR # BLD: 14.61 10*3/MM3 (ref 3.4–10.8)

## 2022-08-05 PROCEDURE — 94799 UNLISTED PULMONARY SVC/PX: CPT

## 2022-08-05 PROCEDURE — 99232 SBSQ HOSP IP/OBS MODERATE 35: CPT | Performed by: INTERNAL MEDICINE

## 2022-08-05 PROCEDURE — 93005 ELECTROCARDIOGRAM TRACING: CPT | Performed by: INTERNAL MEDICINE

## 2022-08-05 PROCEDURE — 71045 X-RAY EXAM CHEST 1 VIEW: CPT

## 2022-08-05 PROCEDURE — 97530 THERAPEUTIC ACTIVITIES: CPT

## 2022-08-05 PROCEDURE — 63710000001 PREDNISONE PER 1 MG: Performed by: INTERNAL MEDICINE

## 2022-08-05 PROCEDURE — 94660 CPAP INITIATION&MGMT: CPT

## 2022-08-05 PROCEDURE — 80053 COMPREHEN METABOLIC PANEL: CPT | Performed by: STUDENT IN AN ORGANIZED HEALTH CARE EDUCATION/TRAINING PROGRAM

## 2022-08-05 PROCEDURE — 71045 X-RAY EXAM CHEST 1 VIEW: CPT | Performed by: RADIOLOGY

## 2022-08-05 PROCEDURE — 25010000002 ENOXAPARIN PER 10 MG: Performed by: STUDENT IN AN ORGANIZED HEALTH CARE EDUCATION/TRAINING PROGRAM

## 2022-08-05 PROCEDURE — 85025 COMPLETE CBC W/AUTO DIFF WBC: CPT | Performed by: STUDENT IN AN ORGANIZED HEALTH CARE EDUCATION/TRAINING PROGRAM

## 2022-08-05 PROCEDURE — 97116 GAIT TRAINING THERAPY: CPT

## 2022-08-05 PROCEDURE — 84484 ASSAY OF TROPONIN QUANT: CPT | Performed by: INTERNAL MEDICINE

## 2022-08-05 RX ADMIN — PREDNISONE 20 MG: 20 TABLET ORAL at 09:35

## 2022-08-05 RX ADMIN — CLOPIDOGREL 75 MG: 75 TABLET, FILM COATED ORAL at 09:35

## 2022-08-05 RX ADMIN — ALBUTEROL SULFATE 2 PUFF: 90 AEROSOL, METERED RESPIRATORY (INHALATION) at 12:24

## 2022-08-05 RX ADMIN — ASPIRIN 81 MG: 81 TABLET, CHEWABLE ORAL at 09:35

## 2022-08-05 RX ADMIN — NYSTATIN: 100000 POWDER TOPICAL at 09:33

## 2022-08-05 RX ADMIN — Medication 10 ML: at 09:36

## 2022-08-05 RX ADMIN — DOCUSATE SODIUM 50 MG AND SENNOSIDES 8.6 MG 2 TABLET: 8.6; 5 TABLET, FILM COATED ORAL at 09:35

## 2022-08-05 RX ADMIN — ATORVASTATIN CALCIUM 80 MG: 40 TABLET, FILM COATED ORAL at 21:01

## 2022-08-05 RX ADMIN — PANTOPRAZOLE SODIUM 40 MG: 40 TABLET, DELAYED RELEASE ORAL at 09:35

## 2022-08-05 RX ADMIN — DOCUSATE SODIUM 50 MG AND SENNOSIDES 8.6 MG 2 TABLET: 8.6; 5 TABLET, FILM COATED ORAL at 21:01

## 2022-08-05 RX ADMIN — ALBUTEROL SULFATE 2 PUFF: 90 AEROSOL, METERED RESPIRATORY (INHALATION) at 00:59

## 2022-08-05 RX ADMIN — ALBUTEROL SULFATE 2 PUFF: 90 AEROSOL, METERED RESPIRATORY (INHALATION) at 07:40

## 2022-08-05 RX ADMIN — BUDESONIDE AND FORMOTEROL FUMARATE DIHYDRATE 2 PUFF: 160; 4.5 AEROSOL RESPIRATORY (INHALATION) at 18:52

## 2022-08-05 RX ADMIN — NICOTINE TRANSDERMAL SYSTEM 1 PATCH: 21 PATCH, EXTENDED RELEASE TRANSDERMAL at 09:35

## 2022-08-05 RX ADMIN — ALBUTEROL SULFATE 2 PUFF: 90 AEROSOL, METERED RESPIRATORY (INHALATION) at 18:52

## 2022-08-05 RX ADMIN — CETIRIZINE HYDROCHLORIDE 10 MG: 10 TABLET, FILM COATED ORAL at 09:35

## 2022-08-05 RX ADMIN — FLUTICASONE PROPIONATE 1 SPRAY: 50 SPRAY, METERED NASAL at 09:38

## 2022-08-05 RX ADMIN — NYSTATIN 1 APPLICATION: 100000 POWDER TOPICAL at 21:01

## 2022-08-05 RX ADMIN — Medication 10 ML: at 09:38

## 2022-08-05 RX ADMIN — BUDESONIDE AND FORMOTEROL FUMARATE DIHYDRATE 2 PUFF: 160; 4.5 AEROSOL RESPIRATORY (INHALATION) at 07:41

## 2022-08-05 RX ADMIN — GUAIFENESIN 1200 MG: 600 TABLET, EXTENDED RELEASE ORAL at 21:01

## 2022-08-05 RX ADMIN — GUAIFENESIN 1200 MG: 600 TABLET, EXTENDED RELEASE ORAL at 09:35

## 2022-08-05 RX ADMIN — Medication 10 ML: at 21:01

## 2022-08-05 RX ADMIN — ENOXAPARIN SODIUM 40 MG: 40 INJECTION SUBCUTANEOUS at 21:02

## 2022-08-05 NOTE — PROGRESS NOTES
[facility] HOSPITALIST PROGRESS NOTE     Patient Identification:  Name:  Emilio Guy  Age:  56 y.o.  Sex:  male  :  1965  MRN:  [id number]  Visit Number:  [id number]  ROOM: [address] [place] [address]     Primary Care Provider:  Valarie Garcia APRN    Length of stay in inpatient status:  11    Subjective     Chief Compliant:    Chief Complaint   Patient presents with   • Altered Mental Status     History of Presenting Illness:    Patient remains ill but stable today, no acute events overnight, no new complaints, denies any fevers or chills, patient does endorse mild dyspnea today, wheezing some, RT in room giving albuterol inhaler, hold on weaning steroids further today, PT/TO consulted and following, patient pending inpatient rehab, is day to day though as isolation through  and may improve prior to that time, creatinine and blood pressure stable, WBC count remains elevated at 14K.   Objective     Current Hospital Meds:  albuterol sulfate HFA, 2 puff, Inhalation, 4x Daily - RT  aspirin, 81 mg, Oral, Daily  atorvastatin, 80 mg, Oral, Nightly  budesonide-formoterol, 2 puff, Inhalation, BID - RT  cetirizine, 10 mg, Oral, Daily  clopidogrel, 75 mg, Oral, Daily  enoxaparin, 40 mg, Subcutaneous, Nightly  fluticasone, 1 spray, Nasal, Daily  guaiFENesin, 1,200 mg, Oral, BID  metoprolol tartrate, 25 mg, Oral, Q12H  nicotine, 1 patch, Transdermal, Q24H  nystatin, , Topical, Q12H  pantoprazole, 40 mg, Oral, QAM AC  predniSONE, 20 mg, Oral, Daily With Breakfast  senna-docusate sodium, 2 tablet, Oral, BID  sodium chloride, 10 mL, Intravenous, Q12H  sodium chloride, 10 mL, Intravenous, Q12H    ----------------------------------------------------------------------------------------------------------------------  Vital Signs:  Temp:  [97.6 °F (36.4 °C)-98.7 °F (37.1 °C)] 97.9 °F (36.6 °C)  Heart Rate:  [63-83] 79  Resp:  [18-20] 18  BP: (102-131)/(56-78) 110/61  SpO2:  [95 %-99 %] 97 %  on  Flow (L/min):  [3.5]  3.5;   Device (Oxygen Therapy): humidified;nasal cannula  Body mass index is 24.25 kg/m².      Intake/Output Summary (Last 24 hours) at 8/5/2022 1326  Last data filed at 8/5/2022 0932  Gross per 24 hour   Intake 1080 ml   Output 1800 ml   Net -720 ml      ----------------------------------------------------------------------------------------------------------------------  Physical exam:  Constitutional: older than stated age, No acute distress.      HENT:  Head:  Normocephalic and atraumatic.  Mouth:  Moist mucous membranes.    Eyes:  Conjunctivae and EOM are normal. No scleral icterus.    Neck:  Neck supple.  No JVD present.    Cardiovascular:  Normal rate, regular rhythm and normal heart sounds with no murmur.  Pulmonary/Chest:  Chronic respiratory distress, mild bilateral expiratory wheezes, on 3.5LNC  Abdominal:  Soft. No distension and no tenderness.   Musculoskeletal:  No tenderness, and no deformity.   Neurological:  Alert and oriented to person, place, and time.  No gross focal deficits   Skin:  Skin is warm and dry. No rash noted. No pallor.   Peripheral vascular:  No clubbing, no cyanosis, no edema.  Psychiatric: Appropriate mood and affect  Edited by: Jose M Mayer MD at 8/5/2022 1326  ----------------------------------------------------------------------------------------------------------------------  CBC - WBC/Hgb/Hct/Plts: 14.61*/11.7*/36.4*/217 (08/05 0348)  CHEM - BUN/Cr/glu/ALT/AST/amyl/lip:19/0.46*/115*/42*/24/--/-- (08/05 0348)  LYTES - Na/K/Cl/CO2: 138/3.8/101/29.5* (08/05 0348)     No results found for: URINECX  No results found for: BLOODCX    I have personally looked at the labs and they are summarized above.  ----------------------------------------------------------------------------------------------------------------------  Detailed radiology reports for the last 24 hours:  XR Chest 1 View    Result Date: 8/5/2022  Left lung base atelectasis or scarring noted. Minimal patchy airspace  disease in this region on excluded.  This report was finalized on 8/5/2022 1:10 PM by Dr. Harsha Anglin MD.      Assessment & Plan    56M PMH chronic respiratory failure on 3 L nasal cannula at home due to COPD who presented with worsening shortness of breath and diagnosed with an acute exacerbation of COPD.     #Sepsis (Not POA), Acute Metabolic Encephalopathy (POA), Acute Hypoxic/Hypercarbic on Chronic Hypoxic Respiratory Failure requiring Non-invasive Positive Pressure Ventilation due Pneumonia, Bacterial, treating for Community Acquired in setting of COPD/Emphysema with Acute Exacerbation, though also noted new Coronavirus 19 + and unknown clinical significance though treated with antiviral medication  #Pulmonary Adenopathy, largest 1.4cm  - Patient presented with increased 02 requirement from home, was on 3L but required Bipap now on 5LNC, did not initially meet sepsis criteria but later with worsening WBC count and heart rate > 90, notably with increased dyspnea, work of breathing, wheezing, imaging concern for bacterial Pneumonia though also with new Coronavirus 19 diagnosis. Patient also known to prior MD and suspected home trilogy nonadherence contributing to illness.   - Admission labs showed 10K->12K, 2.09, lactate 0.7, procalcitonin 0.06, d-dimer 1.21, covid +, strep pneumo and legionella negative, blood cultures negative to date, ABG pH down to 7.2, PC02 up to 91, P02 down to 46.   - Bilateral Venous Duplex negative for DVTs  - CTPE showed no Pulmonary Embolism, emphysema with chronic changes bilateral lungs, mild Pneumonia RLL and minimal patchy infiltrate RUL as well, adenopathy suspected reactive, recommended 3m follow up chest CT  - Infectious Disease not consulted per prior MD  - Pulmonary consulted and following though not here through the weekend  - Palliative Care consulted and following for goals of care conversations,  consulted for spiritual care  - Has completed 7 days  Ceftriaxone/Cefepime and Doxycycline, has completed 5 days Remdesivir  - Continue oral Prednisone, had tapered to 20mg, will hold on further titration given persisting wheezing  - Continue Level I anticoagulation for Coronavirus 19 thromboembolism prophylaxis  - Continue Tylenol as needed for fevers  - Continue home Symbicort, Cetirizine, new Albuterol Inhaler, scheduled duonebs  - Trend Coronavirus 19 progression labs with CBC, CMP, CRP, D-dimer daily   - Monitor on telemetry, continue 02 but wean as able      #Subacute MCA stroke in setting of Chronic appearing left cerebellar infarction  - Imaging abnormalities noted on CT, TeleNeurology consulted with evaluation 7/26, felt likely watershed secondary to significant supraclinoid ICA stenosis. Risk factors include hypertension, tobacco abuse, alcohol abuse  - Patient deferred MRI reporting he could not lay flat or be in confined space, TeleNeurology has made final recommendations and signed off 8/2  - Continue Aspirin 81, statin, plavix   - PT/OT following, monitor on telemetry     #Hypertension  #Chronic HFpEF  - Labs showed troponins < 0.010, proBNP 710  - EKG showed normal sinus rhythm, nonspecific ST changes  - Echocardiogram showed LVEF 66-70%, diastolic dysfunction  - Continue home Aspirin 81, Statin  - Continue home Beta Blocker, titrate as indicated  - No home ACEI/ARB/ARNI, add as indicated  - Further diuresis as needed   - Continue to monitor on telemetry, strict I/O's, trend heart rate and blood pressure      #Chronic alcohol use  - Drinks 3-4 beers per day.  No history of withdrawal. continue CIWA.     #Gastroesophageal Reflux Disease  - Continue home PPI     #Anxiety/Depression  - Continue home regimen     #Constipation  - Continue Doc-Senna, Miralax     #Tobacco Dependence  - Recommended cessation, nicotine replacement therapy as needed     #Medical Non-Adherence  - Given patient's choices and actions to not adhere to medical treatments and  recommendations, this will drastically affect their short and long term morbidity and mortality.       F: Oral  E: Monitor & Replace PRN  N: Diet Soft Texture; Chopped   PPx: Prophylactic Lovenox (Level I)  Code Status (Patient has no pulse and is not breathing): No CPR (Do Not Attempt to Resuscitate)  Medical Interventions (Patient has pulse or is breathing): Limited Support  Medical Intervention Limits: NO intubation (DNI)     Dispo: Pending clinical improvement, PT/OT consulted and following, Social Work to assist with inpatient rehab placement though may improve prior to end of isolation 8/14 and could possibly return home, dispo is day to day.     *This patient is considered high risk due to sepsis, Acute Metabolic Encephalopathy, acute respiratory failure, Coronavirus 19, monitored for drug toxicities on Remdesivir.   Edited by: Jose M Mayer MD at 8/5/2022 1326  [facility]

## 2022-08-05 NOTE — THERAPY TREATMENT NOTE
Acute Care - Physical Therapy Treatment Note   Joaquim     Patient Name: Emilio Guy  : 1965  MRN: [id number]  Today's Date: 2022   Onset of Illness/Injury or Date of Surgery: 22  Visit Dx:     ICD-10-CM ICD-9-CM   1. Acute respiratory failure with hypoxia and hypercapnia (HCC)  J96.01 518.81    J96.02    2. COVID-19  U07.1 079.89   3. Cerebrovascular accident (CVA), unspecified mechanism (HCC)  I63.9 434.91     Patient Active Problem List   Diagnosis   • COPD with acute exacerbation (HCC)   • Acute on chronic respiratory failure with hypoxia (HCC)   • Acute respiratory failure with hypoxia and hypercapnia (HCC)     Past Medical History:   Diagnosis Date   • CHF (congestive heart failure) (HCC)    • COPD (chronic obstructive pulmonary disease) (HCC)      No past surgical history on file.  PT Assessment (last 12 hours)     PT Evaluation and Treatment     Row Name 22          Physical Therapy Time and Intention    Subjective Information no complaints  -CS     Document Type therapy note (daily note)  -CS     Mode of Treatment physical therapy  -CS     Patient Effort good  -CS     Symptoms Noted During/After Treatment shortness of breath;fatigue  -CS     Comment Pt seen bedside this AM.  Pt agreed to PT.  -CS     Row Name 22          General Information    Patient Profile Reviewed yes  -CS     Existing Precautions/Restrictions seizures  -CS     Row Name 22          Living Environment    Primary Care Provided by self;parent(s)  Mother assist with meals, laundry  -CS     Row Name 22          Home Use of Assistive/Adaptive Equipment    Equipment Currently Used at Home cane, quad;oxygen;nebulizer;other (see comments)  [employer] [facility] [employer] for Cane  -CS     Row Name 22          Cognition    Orientation Status (Cognition) oriented x 4  -CS     Row Name 22          Range of Motion Comprehensive    General Range of Motion no range of  motion deficits identified  -     Row Name 08/05/22 0830          Strength Comprehensive (MMT)    General Manual Muscle Testing (MMT) Assessment upper extremity strength deficits identified  -     Row Name 08/05/22 0830          Vision Assessment/Intervention    Visual Impairment/Limitations WFL  -     Row Name 08/05/22 0830          Bed Mobility    Bed Mobility bed mobility (all) activities  -     All Activities, Disney (Bed Mobility) minimum assist (75% patient effort);contact guard  -CS     Assistive Device (Bed Mobility) bed rails;draw sheet;head of bed elevated  -     Row Name 08/05/22 0830          Transfers    Transfers sit-stand transfer;stand-sit transfer  -CS     Sit-Stand [place] (Transfers) contact guard  -CS     Stand-Sit [place] (Transfers) contact guard  -CS     Row Name 08/05/22 0830          Sit-Stand Transfer    Assistive Device (Sit-Stand Transfers) other (see comments)  no AD  -CS     Row Name 08/05/22 0830          Stand-Sit Transfer    Assistive Device (Stand-Sit Transfers) other (see comments)  no AD  -CS     Row Name 08/05/22 0830          Gait/Stairs (Locomotion)    Gait/Stairs Locomotion gait/ambulation independence;gait/ambulation assistive device;distance ambulated  -     [place]ney Level (Gait) contact guard  -CS     Assistive Device (Gait) other (see comments)  no AD, close CGA/HHA  -CS     Distance in Feet (Gait) 12'  -CS     Comment, (Gait/Stairs) Pt c/o fatigue and returned to sitting EOB.  -     Row Name 08/05/22 0830          Safety Issues, Functional Mobility    Impairments Affecting Function (Mobility) balance;endurance/activity tolerance;shortness of breath  -     Row Name 08/05/22 0830          Respiratory WDL    Respiratory WDL X;breath sounds;cough  -CS     Rhythm/Pattern, Respiratory unlabored;depth regular;pattern regular  -CS     Expansion/Accessory Muscles/Retractions no use of accessory muscles  -CS     Cough Frequency frequent  -CS      Cough Type productive  -CS     Row Name 08/05/22 0830          Breath Sounds    Breath Sounds All Fields  -CS     All Lung Fields Breath Sounds diminished  -CS     BORIS Breath Sounds Posterior:;clear  -CS     LLL Breath Sounds diminished  -CS     RUL Breath Sounds wheezes, inspiratory  -CS     RLL Breath Sounds diminished  -CS     Row Name 08/05/22 0830          Skin WDL    Skin WDL WDL  -CS     Skin Color/Characteristics redness blanchable  -CS     Skin Integrity intact  -CS     Row Name 08/05/22 0830          Coping    Observed Emotional State cooperative;calm  -CS     Verbalized Emotional State acceptance  -CS     Family/Support Persons family  -CS     Involvement in Care not present at bedside  -CS     Row Name 08/05/22 0830          Positioning and Restraints    Pre-Treatment Position in bed  -CS     Post Treatment Position bed  -CS     In Bed call light within reach;encouraged to call for assist;supine  -CS     Row Name 08/05/22 0830          Therapy Assessment/Plan (PT)    Rehab Potential (PT) fair, will monitor progress closely  -CS     Criteria for Skilled Interventions Met (PT) yes;meets criteria;skilled treatment is necessary  -CS     Therapy Frequency (PT) 3 times/wk  3-5x/week  -CS     Problem List (PT) problems related to;balance;mobility;strength  -CS     Activity Limitations Related to Problem List (PT) unable to ambulate safely;unable to transfer safely  -CS     Row Name 08/05/22 0830          Progress Summary (PT)    Progress Toward Functional Goals (PT) progress toward functional goals is fair  -CS     Row Name 08/05/22 0830          Physical Therapy Goals    Bed Mobility Goal Selection (PT) bed mobility, PT goal 1  -CS     Transfer Goal Selection (PT) transfer, PT goal 1  -CS     Gait Training Goal Selection (PT) gait training, PT goal 1  -CS     Row Name 08/05/22 0830          Bed Mobility Goal 1 (PT)    Activity/Assistive Device (Bed Mobility Goal 1, PT) bed mobility activities, all  -CS      [place] Level/Cues Needed (Bed Mobility Goal 1, PT) standby assist  -CS     Time Frame (Bed Mobility Goal 1, PT) long term goal (LTG);by discharge  -CS     Row Name 08/05/22 0830          Transfer Goal 1 (PT)    Activity/Assistive Device (Transfer Goal 1, PT) transfers, all  -CS     [place] Level/Cues Needed (Transfer Goal 1, PT) standby assist  -CS     Time Frame (Transfer Goal 1, PT) long term goal (LTG);by discharge  -CS     Row Name 08/05/22 0830          Gait Training Goal 1 (PT)    Activity/Assistive Device (Gait Training Goal 1, PT) gait (walking locomotion);assistive device use  -CS     [place] Level (Gait Training Goal 1, PT) standby assist  -CS     Distance (Gait Training Goal 1, PT) 50'  -CS     Time Frame (Gait Training Goal 1, PT) long term goal (LTG);by discharge  -CS           User Key  (r) = Recorded By, (t) = Taken By, (c) = Cosigned By    Initials Name Provider Type    CS Kaiser Morley, PT Physical Therapist                Physical Therapy Education                 Title: PT OT SLP Therapies (Done)     Topic: Physical Therapy (Done)     Point: Mobility training (Done)     Learning Progress Summary           Patient Acceptance, E,TB, VU by KG at 8/5/2022 0008      Show all documentation for this point (9)                 Point: Home exercise program (Done)     Learning Progress Summary           Patient Acceptance, E,TB, VU by KG at 8/5/2022 0008      Show all documentation for this point (9)                 Point: Body mechanics (Done)     Learning Progress Summary           Patient Acceptance, E,TB, VU by KG at 8/5/2022 0008      Show all documentation for this point (9)                 Point: Precautions (Done)     Learning Progress Summary           Patient Acceptance, E,TB, VU by KG at 8/5/2022 0008      Show all documentation for this point (9)                             User Key     Initials Effective Dates Name Provider Type Discipline    URI 06/27/22 -  Yoanna Espinal RN  Registered Nurse Nurse              PT Recommendation and Plan  Anticipated Discharge Disposition (PT): home with assist, inpatient rehabilitation facility  Planned Therapy Interventions (PT): balance training, bed mobility training, gait training, home exercise program, neuromuscular re-education, patient/family education, strengthening, transfer training  Therapy Frequency (PT): 3 times/wk (3-5x/week)  Progress Summary (PT)  Progress Toward Functional Goals (PT): progress toward functional goals is fair  Daily Progress Summary (PT): Pt w/ improved standing mobility only limited by SOA and fatigue this day.  Plan of Care Reviewed With: patient  Progress: improving  Outcome Evaluation: Pt w/ improved mobility at bedside but limited standing mobility.       Time Calculation:    PT Charges     Row Name 08/05/22 1242             Time Calculation    PT Received On 08/05/22  -CS      PT Goal Re-Cert Due Date 08/11/22  -CS              Timed Charges    15395 - Gait Training Minutes  15  -CS      85158 - PT Therapeutic Activity Minutes 15  -CS              Total Minutes    Timed Charges Total Minutes 30  -CS       Total Minutes 30  -CS            User Key  (r) = Recorded By, (t) = Taken By, (c) = Cosigned By    Initials Name Provider Type    CS Kaiser Morley, PT Physical Therapist              Therapy Charges for Today     Code Description Service Date Service Provider Modifiers Qty    59090517546 HC GAIT TRAINING EA 15 MIN 8/4/2022 Kaiser Morley, PT GP 1    81228510274 HC PT THERAPEUTIC ACT EA 15 MIN 8/4/2022 Kaiser Morley, PT GP 1    16439769040 HC GAIT TRAINING EA 15 MIN 8/5/2022 Kaiser Morley, PT GP 1    47577937646 HC PT THERAPEUTIC ACT EA 15 MIN 8/5/2022 Kaiser Morley, PT GP 1               Kaiser Morley PT  8/5/2022

## 2022-08-05 NOTE — PLAN OF CARE
Goal Outcome Evaluation:  Pt resting in bed. Pt has been pleasant and compliant with all care. Remains on 3.5 L/NC, sats above 90%. NIHSS was a 4 this shift. VSS, no s/s of acute distress noted. Plan of care discussed with pt, no questions or concerns at this time.

## 2022-08-05 NOTE — PLAN OF CARE
Goal Outcome Evaluation: Patient has been very pleasant. He has been compliant with all medication and orders. Patient remains on 3.5L/humidified. NIHSS today scored a 4. Urinal at bedside, good output noted. Patient resting in bed. Will continue to monitor.

## 2022-08-05 NOTE — CASE MANAGEMENT/SOCIAL WORK
Discharge Planning Assessment  [facility]     Patient Name: Emilio Guy  MRN: [id number]  Today's Date: 8/5/2022    Admit Date: 7/24/2022       Discharge Plan     Row Name 08/05/22 1421       Plan    Plan Pt admitted on 7/24/22.  [facility] inpt rehab following pt for possible admit.  SS noted per infection control per Alexa pt can come out of Covid isolation on 8/13.  SS will follow.              Continued Care and Services - Admitted Since 7/24/2022     Destination     Service Provider Request Status Selected Services Address Phone Fax Patient Preferred    [facility]  Pending - No Request Sent N/A [address] [facility] [id number] [phone] --                 MELIA David

## 2022-08-06 LAB
ALBUMIN SERPL-MCNC: 3.09 G/DL (ref 3.5–5.2)
ALBUMIN/GLOB SERPL: 1.5 G/DL
ALP SERPL-CCNC: 45 U/L (ref 39–117)
ALT SERPL W P-5'-P-CCNC: 43 U/L (ref 1–41)
ANION GAP SERPL CALCULATED.3IONS-SCNC: 6.7 MMOL/L (ref 5–15)
AST SERPL-CCNC: 22 U/L (ref 1–40)
BASOPHILS # BLD AUTO: 0.03 10*3/MM3 (ref 0–0.2)
BASOPHILS NFR BLD AUTO: 0.2 % (ref 0–1.5)
BILIRUB SERPL-MCNC: 0.2 MG/DL (ref 0–1.2)
BUN SERPL-MCNC: 14 MG/DL (ref 6–20)
BUN/CREAT SERPL: 26.4 (ref 7–25)
CALCIUM SPEC-SCNC: 8.8 MG/DL (ref 8.6–10.5)
CHLORIDE SERPL-SCNC: 102 MMOL/L (ref 98–107)
CO2 SERPL-SCNC: 28.3 MMOL/L (ref 22–29)
CREAT SERPL-MCNC: 0.53 MG/DL (ref 0.76–1.27)
DEPRECATED RDW RBC AUTO: 49.9 FL (ref 37–54)
EGFRCR SERPLBLD CKD-EPI 2021: 118.4 ML/MIN/1.73
EOSINOPHIL # BLD AUTO: 0.17 10*3/MM3 (ref 0–0.4)
EOSINOPHIL NFR BLD AUTO: 1.1 % (ref 0.3–6.2)
ERYTHROCYTE [DISTWIDTH] IN BLOOD BY AUTOMATED COUNT: 13.2 % (ref 12.3–15.4)
GLOBULIN UR ELPH-MCNC: 2 GM/DL
GLUCOSE SERPL-MCNC: 91 MG/DL (ref 65–99)
HCT VFR BLD AUTO: 38.1 % (ref 37.5–51)
HGB BLD-MCNC: 11.5 G/DL (ref 13–17.7)
IMM GRANULOCYTES # BLD AUTO: 0.33 10*3/MM3 (ref 0–0.05)
IMM GRANULOCYTES NFR BLD AUTO: 2 % (ref 0–0.5)
LYMPHOCYTES # BLD AUTO: 3.9 10*3/MM3 (ref 0.7–3.1)
LYMPHOCYTES NFR BLD AUTO: 24.2 % (ref 19.6–45.3)
MCH RBC QN AUTO: 30.8 PG (ref 26.6–33)
MCHC RBC AUTO-ENTMCNC: 30.2 G/DL (ref 31.5–35.7)
MCV RBC AUTO: 102.1 FL (ref 79–97)
MONOCYTES # BLD AUTO: 1.49 10*3/MM3 (ref 0.1–0.9)
MONOCYTES NFR BLD AUTO: 9.2 % (ref 5–12)
NEUTROPHILS NFR BLD AUTO: 10.2 10*3/MM3 (ref 1.7–7)
NEUTROPHILS NFR BLD AUTO: 63.3 % (ref 42.7–76)
NRBC BLD AUTO-RTO: 0 /100 WBC (ref 0–0.2)
PLATELET # BLD AUTO: 182 10*3/MM3 (ref 140–450)
PMV BLD AUTO: 11.2 FL (ref 6–12)
POTASSIUM SERPL-SCNC: 4.2 MMOL/L (ref 3.5–5.2)
PROT SERPL-MCNC: 5.1 G/DL (ref 6–8.5)
RBC # BLD AUTO: 3.73 10*6/MM3 (ref 4.14–5.8)
SODIUM SERPL-SCNC: 137 MMOL/L (ref 136–145)
WBC NRBC COR # BLD: 16.12 10*3/MM3 (ref 3.4–10.8)

## 2022-08-06 PROCEDURE — 94799 UNLISTED PULMONARY SVC/PX: CPT

## 2022-08-06 PROCEDURE — 80053 COMPREHEN METABOLIC PANEL: CPT | Performed by: STUDENT IN AN ORGANIZED HEALTH CARE EDUCATION/TRAINING PROGRAM

## 2022-08-06 PROCEDURE — 94761 N-INVAS EAR/PLS OXIMETRY MLT: CPT

## 2022-08-06 PROCEDURE — 99232 SBSQ HOSP IP/OBS MODERATE 35: CPT | Performed by: INTERNAL MEDICINE

## 2022-08-06 PROCEDURE — 25010000002 ENOXAPARIN PER 10 MG: Performed by: STUDENT IN AN ORGANIZED HEALTH CARE EDUCATION/TRAINING PROGRAM

## 2022-08-06 PROCEDURE — 85025 COMPLETE CBC W/AUTO DIFF WBC: CPT | Performed by: STUDENT IN AN ORGANIZED HEALTH CARE EDUCATION/TRAINING PROGRAM

## 2022-08-06 PROCEDURE — 63710000001 PREDNISONE PER 1 MG: Performed by: INTERNAL MEDICINE

## 2022-08-06 RX ADMIN — ALBUTEROL SULFATE 2 PUFF: 90 AEROSOL, METERED RESPIRATORY (INHALATION) at 00:33

## 2022-08-06 RX ADMIN — ALBUTEROL SULFATE 2 PUFF: 90 AEROSOL, METERED RESPIRATORY (INHALATION) at 19:09

## 2022-08-06 RX ADMIN — CLOPIDOGREL 75 MG: 75 TABLET, FILM COATED ORAL at 09:20

## 2022-08-06 RX ADMIN — DOCUSATE SODIUM 50 MG AND SENNOSIDES 8.6 MG 2 TABLET: 8.6; 5 TABLET, FILM COATED ORAL at 21:04

## 2022-08-06 RX ADMIN — FLUTICASONE PROPIONATE 1 SPRAY: 50 SPRAY, METERED NASAL at 09:21

## 2022-08-06 RX ADMIN — NICOTINE TRANSDERMAL SYSTEM 1 PATCH: 21 PATCH, EXTENDED RELEASE TRANSDERMAL at 09:28

## 2022-08-06 RX ADMIN — PREDNISONE 20 MG: 20 TABLET ORAL at 09:20

## 2022-08-06 RX ADMIN — NYSTATIN: 100000 POWDER TOPICAL at 09:17

## 2022-08-06 RX ADMIN — ALBUTEROL SULFATE 2 PUFF: 90 AEROSOL, METERED RESPIRATORY (INHALATION) at 13:36

## 2022-08-06 RX ADMIN — Medication 10 ML: at 09:20

## 2022-08-06 RX ADMIN — PANTOPRAZOLE SODIUM 40 MG: 40 TABLET, DELAYED RELEASE ORAL at 09:20

## 2022-08-06 RX ADMIN — BUDESONIDE AND FORMOTEROL FUMARATE DIHYDRATE 2 PUFF: 160; 4.5 AEROSOL RESPIRATORY (INHALATION) at 08:46

## 2022-08-06 RX ADMIN — CETIRIZINE HYDROCHLORIDE 10 MG: 10 TABLET, FILM COATED ORAL at 09:20

## 2022-08-06 RX ADMIN — ASPIRIN 81 MG: 81 TABLET, CHEWABLE ORAL at 09:20

## 2022-08-06 RX ADMIN — Medication 10 ML: at 21:05

## 2022-08-06 RX ADMIN — GUAIFENESIN 1200 MG: 600 TABLET, EXTENDED RELEASE ORAL at 09:20

## 2022-08-06 RX ADMIN — BUDESONIDE AND FORMOTEROL FUMARATE DIHYDRATE 2 PUFF: 160; 4.5 AEROSOL RESPIRATORY (INHALATION) at 19:09

## 2022-08-06 RX ADMIN — DOCUSATE SODIUM 50 MG AND SENNOSIDES 8.6 MG 2 TABLET: 8.6; 5 TABLET, FILM COATED ORAL at 09:20

## 2022-08-06 RX ADMIN — METOPROLOL TARTRATE 25 MG: 25 TABLET, FILM COATED ORAL at 21:04

## 2022-08-06 RX ADMIN — ENOXAPARIN SODIUM 40 MG: 40 INJECTION SUBCUTANEOUS at 21:04

## 2022-08-06 RX ADMIN — NYSTATIN: 100000 POWDER TOPICAL at 21:05

## 2022-08-06 RX ADMIN — ATORVASTATIN CALCIUM 80 MG: 40 TABLET, FILM COATED ORAL at 21:04

## 2022-08-06 RX ADMIN — GUAIFENESIN 1200 MG: 600 TABLET, EXTENDED RELEASE ORAL at 21:04

## 2022-08-06 RX ADMIN — ALBUTEROL SULFATE 2 PUFF: 90 AEROSOL, METERED RESPIRATORY (INHALATION) at 08:46

## 2022-08-06 NOTE — PLAN OF CARE
Goal Outcome Evaluation: Patient has been very pleasant. Compliant with all medication and orders. Patient on 3.5L/NC, saturations remaining in the 90%. Patient ambulated to a chair at bedside and tolerated well. Patient resting in bed. Will continue with plan of care.

## 2022-08-06 NOTE — PROGRESS NOTES
[facility] HOSPITALIST PROGRESS NOTE     Patient Identification:  Name:  Emilio Guy  Age:  56 y.o.  Sex:  male  :  1965  MRN:  [id number]  Visit Number:  [id number]  ROOM: [address] [place] [address]     Primary Care Provider:  Valarie Garcia APRN    Length of stay in inpatient status:  12    Subjective     Chief Compliant:    Chief Complaint   Patient presents with   • Altered Mental Status     History of Presenting Illness:    Patient remains ill but stable today, no acute events overnight, no new complaints, denies any fevers or chills, endorses persisting cough though not worse, no increased sputum production, WBC count worse today at 16K, wheezing stable, on steroids, has completed antibiotics, low threshold to repeat CT chest though for now will hold and await lab trend in AM, still weak and working with PT/OT, has quite a while until can come out of isolation though may be able to return home in a few days with continued work with therapy.   Objective     Current Hospital Meds:  albuterol sulfate HFA, 2 puff, Inhalation, 4x Daily - RT  aspirin, 81 mg, Oral, Daily  atorvastatin, 80 mg, Oral, Nightly  budesonide-formoterol, 2 puff, Inhalation, BID - RT  cetirizine, 10 mg, Oral, Daily  clopidogrel, 75 mg, Oral, Daily  enoxaparin, 40 mg, Subcutaneous, Nightly  fluticasone, 1 spray, Nasal, Daily  guaiFENesin, 1,200 mg, Oral, BID  metoprolol tartrate, 25 mg, Oral, Q12H  nicotine, 1 patch, Transdermal, Q24H  nystatin, , Topical, Q12H  pantoprazole, 40 mg, Oral, QAM AC  predniSONE, 20 mg, Oral, Daily With Breakfast  senna-docusate sodium, 2 tablet, Oral, BID  sodium chloride, 10 mL, Intravenous, Q12H  sodium chloride, 10 mL, Intravenous, Q12H    ----------------------------------------------------------------------------------------------------------------------  Vital Signs:  Temp:  [97.5 °F (36.4 °C)-98.1 °F (36.7 °C)] 97.6 °F (36.4 °C)  Heart Rate:  [66-89] 89  Resp:  [18-20] 18  BP:  (106-127)/(24-75) 108/58  SpO2:  [96 %-99 %] 96 %  on  Flow (L/min):  [3.5] 3.5;   Device (Oxygen Therapy): humidified;nasal cannula  Body mass index is 24.54 kg/m².      Intake/Output Summary (Last 24 hours) at 8/6/2022 1055  Last data filed at 8/6/2022 0815  Gross per 24 hour   Intake 200 ml   Output 3550 ml   Net -3350 ml      ----------------------------------------------------------------------------------------------------------------------  Physical exam:  Constitutional: older than stated age, No acute distress.      HENT:  Head:  Normocephalic and atraumatic.  Mouth:  Moist mucous membranes.    Eyes:  Conjunctivae and EOM are normal. No scleral icterus.    Neck:  Neck supple.  No JVD present.    Cardiovascular:  Normal rate, regular rhythm and normal heart sounds with no murmur.  Pulmonary/Chest:  Chronic respiratory distress, mild bilateral expiratory wheezes persisting, on 3.5LNC  Abdominal:  Soft. No distension and no tenderness.   Musculoskeletal:  No tenderness, and no deformity.   Neurological:  Alert and oriented to person, place, and time.  No gross focal deficits   Skin:  Skin is warm and dry. No rash noted. No pallor.   Peripheral vascular:  No clubbing, no cyanosis, no edema.  Psychiatric: Appropriate mood and affect  Edited by: Jose M Mayer MD at 8/6/2022 1055  ----------------------------------------------------------------------------------------------------------------------  CBC - WBC/Hgb/Hct/Plts: 16.12*/11.5*/38.1/182 (08/06 0251)  CHEM - BUN/Cr/glu/ALT/AST/amyl/lip:14/0.53*/91/43*/22/--/-- (08/06 0251)  LYTES - Na/K/Cl/CO2: 137/4.2/102/28.3 (08/06 0251)     No results found for: URINECX  No results found for: BLOODCX    I have personally looked at the labs and they are summarized above.  ----------------------------------------------------------------------------------------------------------------------  Detailed radiology reports for the last 24 hours:  XR Chest 1 View    Result Date:  8/5/2022  Left lung base atelectasis or scarring noted. Minimal patchy airspace disease in this region on excluded.  This report was finalized on 8/5/2022 1:10 PM by Dr. Harsha Anglin MD.      Assessment & Plan    56M PMH chronic respiratory failure on 3 L nasal cannula at home due to COPD who presented with worsening shortness of breath and diagnosed with an acute exacerbation of COPD.     #Sepsis (Not POA), Acute Metabolic Encephalopathy (POA), Acute Hypoxic/Hypercarbic on Chronic Hypoxic Respiratory Failure requiring Non-invasive Positive Pressure Ventilation due Pneumonia, Bacterial, treating for Community Acquired in setting of COPD/Emphysema with Acute Exacerbation, though also noted new Coronavirus 19 + and unknown clinical significance though treated with antiviral medication  #Pulmonary Adenopathy, largest 1.4cm  - Patient presented with increased 02 requirement from home, was on 3L but required Bipap now on 5LNC, did not initially meet sepsis criteria but later with worsening WBC count and heart rate > 90, notably with increased dyspnea, work of breathing, wheezing, imaging concern for bacterial Pneumonia though also with new Coronavirus 19 diagnosis. Patient also known to prior MD and suspected home trilogy nonadherence contributing to illness.   - Admission labs showed 10K->12K, 2.09, lactate 0.7, procalcitonin 0.06, d-dimer 1.21, covid +, strep pneumo and legionella negative, blood cultures negative to date, ABG pH down to 7.2, PC02 up to 91, P02 down to 46.   - Bilateral Venous Duplex negative for DVTs  - CTPE showed no Pulmonary Embolism, emphysema with chronic changes bilateral lungs, mild Pneumonia RLL and minimal patchy infiltrate RUL as well, adenopathy suspected reactive, recommended 3m follow up chest CT  - Infectious Disease not consulted per prior MD  - Pulmonary consulted and following though not here through the weekend  - Palliative Care consulted and following for goals of care  "conversations though not here over the wekeend,  consulted for spiritual care  - Has completed 7 days Ceftriaxone/Cefepime and Doxycycline, has completed 5 days Remdesivir  - Continue oral Prednisone 20mg daily, will hold on further titration given persisting wheezing  - Continue Level I anticoagulation for Coronavirus 19 thromboembolism prophylaxis  - Continue Tylenol as needed for fevers  - Continue home Symbicort, Cetirizine, new Albuterol Inhaler, scheduled duonebs  - Trend Coronavirus 19 progression labs with CBC, CMP, CRP, D-dimer daily   - Monitor on telemetry, continue 02 but wean as able, WBC count had been stable though bumped to 16K, CXR with noted \"atelectasis\", patient without new or worsening symptoms, considering repeat CT though will defer for now and repeat labs in AM.       #Subacute MCA stroke in setting of Chronic appearing left cerebellar infarction  - Imaging abnormalities noted on CT, TeleNeurology consulted with evaluation 7/26, felt likely watershed secondary to significant supraclinoid ICA stenosis. Risk factors include hypertension, tobacco abuse, alcohol abuse  - Patient deferred MRI reporting he could not lay flat or be in confined space, TeleNeurology has made final recommendations and signed off 8/2  - Continue Aspirin 81, statin, plavix   - PT/OT following, monitor on telemetry     #Hypertension  #Chronic HFpEF  - Labs showed troponins < 0.010, proBNP 710  - EKG showed normal sinus rhythm, nonspecific ST changes  - Echocardiogram showed LVEF 66-70%, diastolic dysfunction  - Continue home Aspirin 81, Statin  - Continue home Beta Blocker, titrate as indicated  - No home ACEI/ARB/ARNI, add as indicated  - Further diuresis as needed   - Continue to monitor on telemetry, strict I/O's, trend heart rate and blood pressure      #Chronic alcohol use  - Drinks 3-4 beers per day.  No history of withdrawal. continue CIWA.     #Gastroesophageal Reflux Disease  - Continue home PPI   "   #Anxiety/Depression  - Continue home regimen     #Constipation  - Continue Doc-Senna, Miralax     #Tobacco Dependence  - Recommended cessation, nicotine replacement therapy as needed     #Medical Non-Adherence  - Given patient's choices and actions to not adhere to medical treatments and recommendations, this will drastically affect their short and long term morbidity and mortality.       F: Oral  E: Monitor & Replace PRN  N: Diet Soft Texture; Chopped   PPx: Prophylactic Lovenox (Level I)  Code Status (Patient has no pulse and is not breathing): No CPR (Do Not Attempt to Resuscitate)  Medical Interventions (Patient has pulse or is breathing): Limited Support  Medical Intervention Limits: NO intubation (DNI)     Dispo: Pending clinical improvement, PT/OT consulted and following, Social Work to assist with inpatient rehab placement though may improve prior to end of isolation 8/14 and could possibly return home, dispo is day to day.     *This patient is considered high risk due to sepsis, Acute Metabolic Encephalopathy, acute respiratory failure, Coronavirus 19, monitored for drug toxicities on Remdesivir.     Edited by: JoseM Mayer MD at 8/6/2022 1055  [facility]

## 2022-08-06 NOTE — PLAN OF CARE
Goal Outcome Evaluation:  Pt resting in bed. Remains on 3.5 L/NC, sats maintaining well above 90%. NIHSS was a 0 this shift. Plan of care discussed with pt, no questions or concerns at this time. VSS. No s/s of acute distress noted. Will continue with plan of care.

## 2022-08-07 ENCOUNTER — APPOINTMENT (OUTPATIENT)
Dept: CT IMAGING | Facility: HOSPITAL | Age: 57
End: 2022-08-07

## 2022-08-07 LAB
ALBUMIN SERPL-MCNC: 3.08 G/DL (ref 3.5–5.2)
ALBUMIN/GLOB SERPL: 1.9 G/DL
ALP SERPL-CCNC: 48 U/L (ref 39–117)
ALT SERPL W P-5'-P-CCNC: 38 U/L (ref 1–41)
ANION GAP SERPL CALCULATED.3IONS-SCNC: 6.7 MMOL/L (ref 5–15)
AST SERPL-CCNC: 19 U/L (ref 1–40)
BASOPHILS # BLD AUTO: 0.04 10*3/MM3 (ref 0–0.2)
BASOPHILS NFR BLD AUTO: 0.2 % (ref 0–1.5)
BILIRUB SERPL-MCNC: 0.2 MG/DL (ref 0–1.2)
BUN SERPL-MCNC: 15 MG/DL (ref 6–20)
BUN/CREAT SERPL: 32.6 (ref 7–25)
CALCIUM SPEC-SCNC: 8.7 MG/DL (ref 8.6–10.5)
CHLORIDE SERPL-SCNC: 102 MMOL/L (ref 98–107)
CO2 SERPL-SCNC: 30.3 MMOL/L (ref 22–29)
CREAT SERPL-MCNC: 0.46 MG/DL (ref 0.76–1.27)
DEPRECATED RDW RBC AUTO: 46.5 FL (ref 37–54)
EGFRCR SERPLBLD CKD-EPI 2021: 123.5 ML/MIN/1.73
EOSINOPHIL # BLD AUTO: 0.21 10*3/MM3 (ref 0–0.4)
EOSINOPHIL NFR BLD AUTO: 1.3 % (ref 0.3–6.2)
ERYTHROCYTE [DISTWIDTH] IN BLOOD BY AUTOMATED COUNT: 12.9 % (ref 12.3–15.4)
GLOBULIN UR ELPH-MCNC: 1.6 GM/DL
GLUCOSE SERPL-MCNC: 106 MG/DL (ref 65–99)
HCT VFR BLD AUTO: 36.4 % (ref 37.5–51)
HGB BLD-MCNC: 11.5 G/DL (ref 13–17.7)
IMM GRANULOCYTES # BLD AUTO: 0.35 10*3/MM3 (ref 0–0.05)
IMM GRANULOCYTES NFR BLD AUTO: 2.1 % (ref 0–0.5)
LYMPHOCYTES # BLD AUTO: 4.05 10*3/MM3 (ref 0.7–3.1)
LYMPHOCYTES NFR BLD AUTO: 24.4 % (ref 19.6–45.3)
MCH RBC QN AUTO: 30.8 PG (ref 26.6–33)
MCHC RBC AUTO-ENTMCNC: 31.6 G/DL (ref 31.5–35.7)
MCV RBC AUTO: 97.6 FL (ref 79–97)
MONOCYTES # BLD AUTO: 1.31 10*3/MM3 (ref 0.1–0.9)
MONOCYTES NFR BLD AUTO: 7.9 % (ref 5–12)
NEUTROPHILS NFR BLD AUTO: 10.63 10*3/MM3 (ref 1.7–7)
NEUTROPHILS NFR BLD AUTO: 64.1 % (ref 42.7–76)
NRBC BLD AUTO-RTO: 0 /100 WBC (ref 0–0.2)
PLATELET # BLD AUTO: 208 10*3/MM3 (ref 140–450)
PMV BLD AUTO: 11.3 FL (ref 6–12)
POTASSIUM SERPL-SCNC: 4.1 MMOL/L (ref 3.5–5.2)
PROCALCITONIN SERPL-MCNC: 0.03 NG/ML (ref 0–0.25)
PROT SERPL-MCNC: 4.7 G/DL (ref 6–8.5)
RBC # BLD AUTO: 3.73 10*6/MM3 (ref 4.14–5.8)
SODIUM SERPL-SCNC: 139 MMOL/L (ref 136–145)
WBC NRBC COR # BLD: 16.59 10*3/MM3 (ref 3.4–10.8)

## 2022-08-07 PROCEDURE — 94799 UNLISTED PULMONARY SVC/PX: CPT

## 2022-08-07 PROCEDURE — 94761 N-INVAS EAR/PLS OXIMETRY MLT: CPT

## 2022-08-07 PROCEDURE — 80053 COMPREHEN METABOLIC PANEL: CPT | Performed by: STUDENT IN AN ORGANIZED HEALTH CARE EDUCATION/TRAINING PROGRAM

## 2022-08-07 PROCEDURE — 84145 PROCALCITONIN (PCT): CPT | Performed by: INTERNAL MEDICINE

## 2022-08-07 PROCEDURE — 63710000001 PREDNISONE PER 1 MG: Performed by: INTERNAL MEDICINE

## 2022-08-07 PROCEDURE — 99232 SBSQ HOSP IP/OBS MODERATE 35: CPT | Performed by: INTERNAL MEDICINE

## 2022-08-07 PROCEDURE — 85025 COMPLETE CBC W/AUTO DIFF WBC: CPT | Performed by: STUDENT IN AN ORGANIZED HEALTH CARE EDUCATION/TRAINING PROGRAM

## 2022-08-07 PROCEDURE — 71250 CT THORAX DX C-: CPT

## 2022-08-07 PROCEDURE — 25010000002 ENOXAPARIN PER 10 MG: Performed by: STUDENT IN AN ORGANIZED HEALTH CARE EDUCATION/TRAINING PROGRAM

## 2022-08-07 RX ADMIN — Medication 10 ML: at 19:42

## 2022-08-07 RX ADMIN — ASPIRIN 81 MG: 81 TABLET, CHEWABLE ORAL at 14:46

## 2022-08-07 RX ADMIN — NYSTATIN: 100000 POWDER TOPICAL at 19:41

## 2022-08-07 RX ADMIN — ENOXAPARIN SODIUM 40 MG: 40 INJECTION SUBCUTANEOUS at 19:40

## 2022-08-07 RX ADMIN — CETIRIZINE HYDROCHLORIDE 10 MG: 10 TABLET, FILM COATED ORAL at 11:43

## 2022-08-07 RX ADMIN — GUAIFENESIN 1200 MG: 600 TABLET, EXTENDED RELEASE ORAL at 11:41

## 2022-08-07 RX ADMIN — DOCUSATE SODIUM 50 MG AND SENNOSIDES 8.6 MG 2 TABLET: 8.6; 5 TABLET, FILM COATED ORAL at 19:41

## 2022-08-07 RX ADMIN — CLOPIDOGREL 75 MG: 75 TABLET, FILM COATED ORAL at 11:43

## 2022-08-07 RX ADMIN — Medication 10 ML: at 11:44

## 2022-08-07 RX ADMIN — PANTOPRAZOLE SODIUM 40 MG: 40 TABLET, DELAYED RELEASE ORAL at 11:41

## 2022-08-07 RX ADMIN — ALBUTEROL SULFATE 2 PUFF: 90 AEROSOL, METERED RESPIRATORY (INHALATION) at 06:53

## 2022-08-07 RX ADMIN — METOPROLOL TARTRATE 25 MG: 25 TABLET, FILM COATED ORAL at 19:40

## 2022-08-07 RX ADMIN — GUAIFENESIN 1200 MG: 600 TABLET, EXTENDED RELEASE ORAL at 19:40

## 2022-08-07 RX ADMIN — ATORVASTATIN CALCIUM 80 MG: 40 TABLET, FILM COATED ORAL at 19:41

## 2022-08-07 RX ADMIN — ALBUTEROL SULFATE 2 PUFF: 90 AEROSOL, METERED RESPIRATORY (INHALATION) at 19:40

## 2022-08-07 RX ADMIN — ALBUTEROL SULFATE 2 PUFF: 90 AEROSOL, METERED RESPIRATORY (INHALATION) at 03:21

## 2022-08-07 RX ADMIN — FLUTICASONE PROPIONATE 1 SPRAY: 50 SPRAY, METERED NASAL at 11:40

## 2022-08-07 RX ADMIN — BUDESONIDE AND FORMOTEROL FUMARATE DIHYDRATE 2 PUFF: 160; 4.5 AEROSOL RESPIRATORY (INHALATION) at 19:39

## 2022-08-07 RX ADMIN — DOCUSATE SODIUM 50 MG AND SENNOSIDES 8.6 MG 2 TABLET: 8.6; 5 TABLET, FILM COATED ORAL at 11:44

## 2022-08-07 RX ADMIN — NYSTATIN: 100000 POWDER TOPICAL at 11:40

## 2022-08-07 RX ADMIN — Medication 10 ML: at 11:45

## 2022-08-07 RX ADMIN — NICOTINE TRANSDERMAL SYSTEM 1 PATCH: 21 PATCH, EXTENDED RELEASE TRANSDERMAL at 11:40

## 2022-08-07 RX ADMIN — PREDNISONE 20 MG: 20 TABLET ORAL at 11:43

## 2022-08-07 RX ADMIN — BUDESONIDE AND FORMOTEROL FUMARATE DIHYDRATE 2 PUFF: 160; 4.5 AEROSOL RESPIRATORY (INHALATION) at 06:53

## 2022-08-07 RX ADMIN — ALBUTEROL SULFATE 2 PUFF: 90 AEROSOL, METERED RESPIRATORY (INHALATION) at 13:07

## 2022-08-07 NOTE — PLAN OF CARE
Goal Outcome Evaluation: Patient was very pleasant today. Compliant with all medication and orders. Patient remains on 3.5L/NC, saturations remaining in the 90's. NIHSS score is a 0. Patient ambulated to bedside with 2 assist, BM noted. Patient is currently resting in bed. Will continue with plan of care.

## 2022-08-07 NOTE — PLAN OF CARE
Goal Outcome Evaluation:              Outcome Evaluation: Pt currently resting in bed. VSS. No s/s of distress noted. Will continue to follow plan of care.

## 2022-08-07 NOTE — PROGRESS NOTES
[facility] HOSPITALIST PROGRESS NOTE     Patient Identification:  Name:  Emilio Guy  Age:  56 y.o.  Sex:  male  :  1965  MRN:  [id number]  Visit Number:  [id number]  ROOM: [address] [place] [address]     Primary Care Provider:  Valarie Garcia APRN    Length of stay in inpatient status:  13    Subjective     Chief Compliant:    Chief Complaint   Patient presents with   • Altered Mental Status     History of Presenting Illness:    Patient remains ill but stable today, no acute events overnight, no new complaints, denies any fevers or chills, still endorsing dyspnea at baseline and persisting wheezing, WBC count up today, denied any sputum production or new cough, repeated CT and somewhat improved pneumonia, repeat procalcitonin normal, hold on further antibiotics, continue steroids due to persisting wheezing, remains in isolation, awaiting inpatient rehab though is day to day and need to weigh improvement while awaiting end of isolation  per infection control.   Objective     Current Hospital Meds:  albuterol sulfate HFA, 2 puff, Inhalation, 4x Daily - RT  aspirin, 81 mg, Oral, Daily  atorvastatin, 80 mg, Oral, Nightly  budesonide-formoterol, 2 puff, Inhalation, BID - RT  cetirizine, 10 mg, Oral, Daily  clopidogrel, 75 mg, Oral, Daily  enoxaparin, 40 mg, Subcutaneous, Nightly  fluticasone, 1 spray, Nasal, Daily  guaiFENesin, 1,200 mg, Oral, BID  metoprolol tartrate, 25 mg, Oral, Q12H  nicotine, 1 patch, Transdermal, Q24H  nystatin, , Topical, Q12H  pantoprazole, 40 mg, Oral, QAM AC  predniSONE, 20 mg, Oral, Daily With Breakfast  senna-docusate sodium, 2 tablet, Oral, BID  sodium chloride, 10 mL, Intravenous, Q12H  sodium chloride, 10 mL, Intravenous, Q12H    ----------------------------------------------------------------------------------------------------------------------  Vital Signs:  Temp:  [98 °F (36.7 °C)-98.3 °F (36.8 °C)] 98.2 °F (36.8 °C)  Heart Rate:  [63-91] 63  Resp:  [16-22] 18  BP:  ()/(58-75) 103/60  SpO2:  [95 %-96 %] 95 %  on  Flow (L/min):  [3-3.5] 3;   Device (Oxygen Therapy): humidified;nasal cannula  Body mass index is 24.71 kg/m².      Intake/Output Summary (Last 24 hours) at 8/7/2022 1230  Last data filed at 8/7/2022 0500  Gross per 24 hour   Intake 720 ml   Output 1575 ml   Net -855 ml      ----------------------------------------------------------------------------------------------------------------------  Physical exam:  Constitutional: older than stated age, No acute distress.      HENT:  Head:  Normocephalic and atraumatic.  Mouth:  Moist mucous membranes.    Eyes:  Conjunctivae and EOM are normal. No scleral icterus.    Neck:  Neck supple.  No JVD present.    Cardiovascular:  Normal rate, regular rhythm and normal heart sounds with no murmur.  Pulmonary/Chest:  Chronic respiratory distress, mild bilateral expiratory wheezes persisting, on 3LNC now  Abdominal:  Soft. No distension and no tenderness.   Musculoskeletal:  No tenderness, and no deformity.   Neurological:  Alert and oriented to person, place, and time.  No gross focal deficits   Skin:  Skin is warm and dry. No rash noted. No pallor.   Peripheral vascular:  No clubbing, no cyanosis, no edema.  Psychiatric: Appropriate mood and affect  Edited by: Jose M Mayer MD at 8/7/2022 1230  ----------------------------------------------------------------------------------------------------------------------  CBC - WBC/Hgb/Hct/Plts: 16.59*/11.5*/36.4*/208 (08/07 0251)  CHEM - BUN/Cr/glu/ALT/AST/amyl/lip:15/0.46*/106*/38/19/--/-- (08/07 0251)  LYTES - Na/K/Cl/CO2: 139/4.1/102/30.3* (08/07 0251)     No results found for: URINECX  No results found for: BLOODCX    I have personally looked at the labs and they are summarized above.  ----------------------------------------------------------------------------------------------------------------------  Detailed radiology reports for the last 24 hours:  CT Chest Without Contrast  Diagnostic    Result Date: 8/7/2022  1. Chronic airspace disease in the right middle lobe and lingula. There is some improvement on the right. Correlate clinically and follow-up to document complete resolution recommended. 2.   Moderate coronary artery calcification present. These can be predictive of future all cause mortality and cardiovascular events and coronary artery calcification scoring may be clinically useful. Signer Name: Kellie Islas MD  Signed: 8/7/2022 11:42 AM  Workstation Name: [facility]-  [facility]    XR Chest 1 View    Result Date: 8/5/2022  Left lung base atelectasis or scarring noted. Minimal patchy airspace disease in this region on excluded.  This report was finalized on 8/5/2022 1:10 PM by Dr. Harsha Anglin MD.      Assessment & Plan    56M PMH chronic respiratory failure on 3 L nasal cannula at home due to COPD who presented with worsening shortness of breath and diagnosed with an acute exacerbation of COPD.     #Sepsis (Not POA), Acute Metabolic Encephalopathy (POA), Acute Hypoxic/Hypercarbic on Chronic Hypoxic Respiratory Failure requiring Non-invasive Positive Pressure Ventilation due Pneumonia, Bacterial, treating for Community Acquired in setting of COPD/Emphysema with Acute Exacerbation, though also noted new Coronavirus 19 + and unknown clinical significance though treated with antiviral medication  #Pulmonary Adenopathy, largest 1.4cm  - Patient presented with increased 02 requirement from home, was on 3L but required Bipap now on 5LNC, did not initially meet sepsis criteria but later with worsening WBC count and heart rate > 90, notably with increased dyspnea, work of breathing, wheezing, imaging concern for bacterial Pneumonia though also with new Coronavirus 19 diagnosis. Patient also known to prior MD and suspected home trilogy nonadherence contributing to illness.   - Admission labs showed 10K->12K, 2.09, lactate 0.7, procalcitonin 0.06, d-dimer  1.21, covid +, strep pneumo and legionella negative, blood cultures negative to date, ABG pH down to 7.2, PC02 up to 91, P02 down to 46.   - Bilateral Venous Duplex negative for DVTs  - CTPE showed no Pulmonary Embolism, emphysema with chronic changes bilateral lungs, mild Pneumonia RLL and minimal patchy infiltrate RUL as well, adenopathy suspected reactive, recommended 3m follow up chest CT  - Infectious Disease not consulted per prior MD  - Pulmonary consulted and following though not here through the weekend  - Palliative Care consulted and following for goals of care conversations though not here over the wekeend,  consulted for spiritual care  - Has completed 7 days Ceftriaxone/Cefepime and Doxycycline, has completed 5 days Remdesivir  - Continue oral Prednisone 20mg daily, will hold on further titration given persisting wheezing  - Continue Level I anticoagulation for Coronavirus 19 thromboembolism prophylaxis  - Continue Tylenol as needed for fevers  - Continue home Symbicort, Cetirizine, new Albuterol Inhaler, scheduled duonebs  - Trend Coronavirus 19 progression labs with CBC, CMP, CRP, D-dimer daily   - Monitor on telemetry, continue 02 but wean as able, WBC count remaining elevated and mildly up today at 16.5K, CXR yesterday with patchy airspace disease and unsure if was new or old pneumonia, repeated CT and showed chronic airspace disease RML and lingula, somewhat improved on R side, repeat procalcitonin pending at the time and was normal, holding on further antibiotics for now, trend CBC in AM, follow up wheezing      #Subacute MCA stroke in setting of Chronic appearing left cerebellar infarction  - Imaging abnormalities noted on CT, TeleNeurology consulted with evaluation 7/26, felt likely watershed secondary to significant supraclinoid ICA stenosis. Risk factors include hypertension, tobacco abuse, alcohol abuse  - Patient deferred MRI reporting he could not lay flat or be in confined space,  TeleNeurology has made final recommendations and signed off 8/2  - Continue Aspirin 81, statin, plavix   - PT/OT following, monitor on telemetry     #Hypertension/Dyslipidemia/Non-obstructive CAD  #Chronic HFpEF  - Labs showed troponins < 0.010, proBNP 710  - EKG showed normal sinus rhythm, nonspecific ST changes  - Echocardiogram showed LVEF 66-70%, diastolic dysfunction  - CT Chest showed mod coronary calcifications  - Continue home Aspirin 81, Statin  - Continue home Beta Blocker, titrate as indicated  - No home ACEI/ARB/ARNI, add as indicated  - Further diuresis as needed   - Continue to monitor on telemetry, strict I/O's, trend heart rate and blood pressure      #Chronic alcohol use  - Drinks 3-4 beers per day.  No history of withdrawal. continue CIWA.     #Gastroesophageal Reflux Disease  - Continue home PPI     #Anxiety/Depression  - Continue home regimen     #Constipation  - Continue Doc-Senna, Miralax     #Tobacco Dependence  - Recommended cessation, nicotine replacement therapy as needed     #Medical Non-Adherence  - Given patient's choices and actions to not adhere to medical treatments and recommendations, this will drastically affect their short and long term morbidity and mortality.       F: Oral  E: Monitor & Replace PRN  N: Diet Soft Texture; Chopped   PPx: Prophylactic Lovenox (Level I)  Code Status (Patient has no pulse and is not breathing): No CPR (Do Not Attempt to Resuscitate)  Medical Interventions (Patient has pulse or is breathing): Limited Support  Medical Intervention Limits: NO intubation (DNI)     Dispo: Pending clinical improvement, PT/OT consulted and following, Social Work to assist with inpatient rehab placement though may improve prior to end of isolation 8/14 and could possibly return home, dispo is day to day.     *This patient is considered high risk due to sepsis, Acute Metabolic Encephalopathy, acute respiratory failure, Coronavirus 19, monitored for drug toxicities on  Remdesblake.     Edited by: Jose M Mayer MD at 8/7/2022 1230  [facility]

## 2022-08-08 LAB
ALBUMIN SERPL-MCNC: 3.11 G/DL (ref 3.5–5.2)
ALBUMIN/GLOB SERPL: 1.6 G/DL
ALP SERPL-CCNC: 47 U/L (ref 39–117)
ALT SERPL W P-5'-P-CCNC: 42 U/L (ref 1–41)
ANION GAP SERPL CALCULATED.3IONS-SCNC: 8.1 MMOL/L (ref 5–15)
AST SERPL-CCNC: 21 U/L (ref 1–40)
BASOPHILS # BLD AUTO: 0.04 10*3/MM3 (ref 0–0.2)
BASOPHILS NFR BLD AUTO: 0.3 % (ref 0–1.5)
BILIRUB SERPL-MCNC: 0.2 MG/DL (ref 0–1.2)
BUN SERPL-MCNC: 13 MG/DL (ref 6–20)
BUN/CREAT SERPL: 24.5 (ref 7–25)
CALCIUM SPEC-SCNC: 8.7 MG/DL (ref 8.6–10.5)
CHLORIDE SERPL-SCNC: 100 MMOL/L (ref 98–107)
CO2 SERPL-SCNC: 30.9 MMOL/L (ref 22–29)
CREAT SERPL-MCNC: 0.53 MG/DL (ref 0.76–1.27)
DEPRECATED RDW RBC AUTO: 46.1 FL (ref 37–54)
EGFRCR SERPLBLD CKD-EPI 2021: 118.4 ML/MIN/1.73
EOSINOPHIL # BLD AUTO: 0.24 10*3/MM3 (ref 0–0.4)
EOSINOPHIL NFR BLD AUTO: 1.5 % (ref 0.3–6.2)
ERYTHROCYTE [DISTWIDTH] IN BLOOD BY AUTOMATED COUNT: 13 % (ref 12.3–15.4)
GLOBULIN UR ELPH-MCNC: 1.9 GM/DL
GLUCOSE SERPL-MCNC: 82 MG/DL (ref 65–99)
HCT VFR BLD AUTO: 37.1 % (ref 37.5–51)
HGB BLD-MCNC: 11.7 G/DL (ref 13–17.7)
IMM GRANULOCYTES # BLD AUTO: 0.42 10*3/MM3 (ref 0–0.05)
IMM GRANULOCYTES NFR BLD AUTO: 2.6 % (ref 0–0.5)
LYMPHOCYTES # BLD AUTO: 3.82 10*3/MM3 (ref 0.7–3.1)
LYMPHOCYTES NFR BLD AUTO: 23.9 % (ref 19.6–45.3)
MCH RBC QN AUTO: 30.8 PG (ref 26.6–33)
MCHC RBC AUTO-ENTMCNC: 31.5 G/DL (ref 31.5–35.7)
MCV RBC AUTO: 97.6 FL (ref 79–97)
MONOCYTES # BLD AUTO: 1.2 10*3/MM3 (ref 0.1–0.9)
MONOCYTES NFR BLD AUTO: 7.5 % (ref 5–12)
NEUTROPHILS NFR BLD AUTO: 10.26 10*3/MM3 (ref 1.7–7)
NEUTROPHILS NFR BLD AUTO: 64.2 % (ref 42.7–76)
NRBC BLD AUTO-RTO: 0 /100 WBC (ref 0–0.2)
PLATELET # BLD AUTO: 204 10*3/MM3 (ref 140–450)
PMV BLD AUTO: 11 FL (ref 6–12)
POTASSIUM SERPL-SCNC: 4.3 MMOL/L (ref 3.5–5.2)
PROT SERPL-MCNC: 5 G/DL (ref 6–8.5)
RBC # BLD AUTO: 3.8 10*6/MM3 (ref 4.14–5.8)
SODIUM SERPL-SCNC: 139 MMOL/L (ref 136–145)
WBC NRBC COR # BLD: 15.98 10*3/MM3 (ref 3.4–10.8)

## 2022-08-08 PROCEDURE — 97530 THERAPEUTIC ACTIVITIES: CPT

## 2022-08-08 PROCEDURE — 63710000001 PREDNISONE PER 1 MG: Performed by: INTERNAL MEDICINE

## 2022-08-08 PROCEDURE — 94799 UNLISTED PULMONARY SVC/PX: CPT

## 2022-08-08 PROCEDURE — 80053 COMPREHEN METABOLIC PANEL: CPT | Performed by: STUDENT IN AN ORGANIZED HEALTH CARE EDUCATION/TRAINING PROGRAM

## 2022-08-08 PROCEDURE — 97110 THERAPEUTIC EXERCISES: CPT

## 2022-08-08 PROCEDURE — 99232 SBSQ HOSP IP/OBS MODERATE 35: CPT | Performed by: INTERNAL MEDICINE

## 2022-08-08 PROCEDURE — 25010000002 ENOXAPARIN PER 10 MG: Performed by: STUDENT IN AN ORGANIZED HEALTH CARE EDUCATION/TRAINING PROGRAM

## 2022-08-08 PROCEDURE — 94761 N-INVAS EAR/PLS OXIMETRY MLT: CPT

## 2022-08-08 PROCEDURE — 85025 COMPLETE CBC W/AUTO DIFF WBC: CPT | Performed by: STUDENT IN AN ORGANIZED HEALTH CARE EDUCATION/TRAINING PROGRAM

## 2022-08-08 RX ADMIN — Medication 10 ML: at 20:10

## 2022-08-08 RX ADMIN — BUDESONIDE AND FORMOTEROL FUMARATE DIHYDRATE 2 PUFF: 160; 4.5 AEROSOL RESPIRATORY (INHALATION) at 18:38

## 2022-08-08 RX ADMIN — ALBUTEROL SULFATE 2 PUFF: 90 AEROSOL, METERED RESPIRATORY (INHALATION) at 18:38

## 2022-08-08 RX ADMIN — ATORVASTATIN CALCIUM 80 MG: 40 TABLET, FILM COATED ORAL at 20:09

## 2022-08-08 RX ADMIN — BUDESONIDE AND FORMOTEROL FUMARATE DIHYDRATE 2 PUFF: 160; 4.5 AEROSOL RESPIRATORY (INHALATION) at 06:31

## 2022-08-08 RX ADMIN — Medication 10 ML: at 09:45

## 2022-08-08 RX ADMIN — CLOPIDOGREL 75 MG: 75 TABLET, FILM COATED ORAL at 08:48

## 2022-08-08 RX ADMIN — PANTOPRAZOLE SODIUM 40 MG: 40 TABLET, DELAYED RELEASE ORAL at 08:48

## 2022-08-08 RX ADMIN — GUAIFENESIN 1200 MG: 600 TABLET, EXTENDED RELEASE ORAL at 08:48

## 2022-08-08 RX ADMIN — NICOTINE TRANSDERMAL SYSTEM 1 PATCH: 21 PATCH, EXTENDED RELEASE TRANSDERMAL at 08:50

## 2022-08-08 RX ADMIN — NYSTATIN: 100000 POWDER TOPICAL at 09:45

## 2022-08-08 RX ADMIN — ENOXAPARIN SODIUM 40 MG: 40 INJECTION SUBCUTANEOUS at 20:09

## 2022-08-08 RX ADMIN — ALBUTEROL SULFATE 2 PUFF: 90 AEROSOL, METERED RESPIRATORY (INHALATION) at 06:31

## 2022-08-08 RX ADMIN — DOCUSATE SODIUM 50 MG AND SENNOSIDES 8.6 MG 2 TABLET: 8.6; 5 TABLET, FILM COATED ORAL at 20:09

## 2022-08-08 RX ADMIN — GUAIFENESIN 1200 MG: 600 TABLET, EXTENDED RELEASE ORAL at 20:09

## 2022-08-08 RX ADMIN — FLUTICASONE PROPIONATE 1 SPRAY: 50 SPRAY, METERED NASAL at 08:47

## 2022-08-08 RX ADMIN — PREDNISONE 20 MG: 20 TABLET ORAL at 08:48

## 2022-08-08 RX ADMIN — CETIRIZINE HYDROCHLORIDE 10 MG: 10 TABLET, FILM COATED ORAL at 08:47

## 2022-08-08 RX ADMIN — NYSTATIN: 100000 POWDER TOPICAL at 20:09

## 2022-08-08 RX ADMIN — Medication 10 ML: at 08:51

## 2022-08-08 RX ADMIN — DOCUSATE SODIUM 50 MG AND SENNOSIDES 8.6 MG 2 TABLET: 8.6; 5 TABLET, FILM COATED ORAL at 08:48

## 2022-08-08 RX ADMIN — ALBUTEROL SULFATE 2 PUFF: 90 AEROSOL, METERED RESPIRATORY (INHALATION) at 12:30

## 2022-08-08 RX ADMIN — ASPIRIN 81 MG: 81 TABLET, CHEWABLE ORAL at 08:47

## 2022-08-08 RX ADMIN — METOPROLOL TARTRATE 25 MG: 25 TABLET, FILM COATED ORAL at 08:47

## 2022-08-08 RX ADMIN — METOPROLOL TARTRATE 25 MG: 25 TABLET, FILM COATED ORAL at 20:09

## 2022-08-08 NOTE — NURSING NOTE
Rehab continues to follow patient and will need therapy updates when medically ready and is cleared by IC to be out of isolation.

## 2022-08-08 NOTE — PLAN OF CARE
Goal Outcome Evaluation:  Pt resting in bed at this time. No complaints. No acute signs of distress. Will continue to follow plan of care.

## 2022-08-08 NOTE — PROGRESS NOTES
" LOS: 14 days     Chief Complaint:  Pulmonology is following for respiratory failure    Subjective     Interval History:     Currently requiring 5 L nasal cannula, previously reported baseline. No acute issues overnight.       Review of Systems:     Positive for SOB  Otherwise negative        Objective     Vital Signs  /63 (BP Location: Left arm, Patient Position: Lying)   Pulse 91   Temp 98.2 °F (36.8 °C) (Oral)   Resp 18   Ht 175.3 cm (69\")   Wt 76.4 kg (168 lb 8 oz)   SpO2 94%   BMI 24.88 kg/m²      Physical Exam:                    Results Review:   I reviewed the patient's new clinical results.  I reviewed the patient's new imaging results and agree with the interpretation.        CBC      CBC 8/6/22 8/7/22 8/8/22   WBC 16.12 (A) 16.59 (A) 15.98 (A)   RBC 3.73 (A) 3.73 (A) 3.80 (A)   Hemoglobin 11.5 (A) 11.5 (A) 11.7 (A)   Hematocrit 38.1 36.4 (A) 37.1 (A)   .1 (A) 97.6 (A) 97.6 (A)   MCH 30.8 30.8 30.8   MCHC 30.2 (A) 31.6 31.5   RDW 13.2 12.9 13.0   Platelets 182 208 204   (A) Abnormal value                  CMP      CMP 8/6/22 8/7/22 8/8/22   Glucose 91 106 (A) 82   BUN 14 15 13   Creatinine 0.53 (A) 0.46 (A) 0.53 (A)   Sodium 137 139 139   Potassium 4.2 4.1 4.3   Chloride 102 102 100   Calcium 8.8 8.7 8.7   Albumin 3.09 (A) 3.08 (A) 3.11 (A)   Total Bilirubin 0.2 0.2 0.2   Alkaline Phosphatase 45 48 47   AST (SGOT) 22 19 21   ALT (SGPT) 43 (A) 38 42 (A)   (A) Abnormal value         Comments are available for some flowsheets but are not being displayed.                 No results found for: SITE, ALLENTEST, PHART, FBD0ZWZ, PO2ART, LWM8YQK, BASEEXCESS, L6WCXZIX, HGBBG, HCTABG, OXYHEMOGLOBI, METHHGBN, CARBOXYHGB, CO2CT, BAROMETRIC, MODALITY, FIO2        Medication Review:   Scheduled Medications:  albuterol sulfate HFA, 2 puff, Inhalation, 4x Daily - RT  aspirin, 81 mg, Oral, Daily  atorvastatin, 80 mg, Oral, Nightly  budesonide-formoterol, 2 puff, Inhalation, BID - RT  cetirizine, 10 mg, " Oral, Daily  clopidogrel, 75 mg, Oral, Daily  enoxaparin, 40 mg, Subcutaneous, Nightly  fluticasone, 1 spray, Nasal, Daily  guaiFENesin, 1,200 mg, Oral, BID  metoprolol tartrate, 25 mg, Oral, Q12H  nicotine, 1 patch, Transdermal, Q24H  nystatin, , Topical, Q12H  pantoprazole, 40 mg, Oral, QAM AC  predniSONE, 20 mg, Oral, Daily With Breakfast  senna-docusate sodium, 2 tablet, Oral, BID  sodium chloride, 10 mL, Intravenous, Q12H  sodium chloride, 10 mL, Intravenous, Q12H      Continuous infusions:         Assessment:   Acute on chronic hypoxic and hypercapnic respiratory failure  COPD exacerbation  Community-acquired pneumonia  COVID-19 positivity   COPD  Chronic diastolic heart failure  Mediastinal adenopathy  Tobacco abuse      History of alcohol use  Encephalopathy  Subacute stroke/chronic appearing left cerebral infarction        Plan:   Currently requiring 5 L nasal cannula, reported baseline  Will titrate fio2 to maintain spo2 at 90% or greater.   Previously received a home NIV device, but was noncompliant.  Completed steroid course   Completed Doxycyline, Cefepime  Received remdesivir  Continue mucinex for congestion.  Can complete imaging in the outpatient setting for follow up of adenopathy on CT chest  On symbicort inhaler   On albuterol inhaler  Remains net negative fluid balance.   Nicoderm patches  GI prophylaxis: pantoprazole  DVT prophylaxis: Lovenox  Agree with PT/OT when able.   No results found for: ACANTHNAEG, AFBCX, BPERTUSSISCX, BLOODCX  No results found for: BCIDPCR, CXREFLEX, CSFCX, CULTURETIS  No results found for: CULTURES, HSVCX, URCX  No results found for: EYECULTURE, GCCX, HSVCULTURE, LABHSV  No results found for: LEGIONELLA, MRSACX, MUMPSCX, MYCOPLASCX  No results found for: NOCARDIACX, STOOLCX  No results found for: THROATCX, UNSTIMCULT, URINECX, CULTURE, VZVCULTUR  No results found for: VIRALCULTU, WOUNDCX       Oly Islas PA-C  08/08/22  18:44 EDT      Scribed for Dr. Espinosa by  Oly Islas PA-C  Case d/w nurse and team   This service is provided via audio video tele medicine   I am in [facility] and patient is in [facility]

## 2022-08-08 NOTE — PAYOR COMM NOTE
"[facility]  NPI:[id number]    Utilization Review  Contact: Catalina Hernandez RN  Phone: [phone]  Fax:[phone]      CLINICAL UPDATE   [id number]    Jack Guy (56 y.o. Male)             Date of Birth   1965    Social Security Number       Address   [address] [place] [address]    Home Phone   [phone]    MRN   [id number]       Anabaptist   None    Marital Status   Single                            Admission Date   7/24/22    Admission Type   Emergency    Admitting Provider   Thomas Darden DO    Attending Provider   Viral Waldron DO    Department, Room/Bed   [facility], 3312/1P       Discharge Date       Discharge Disposition       Discharge Destination                               Attending Provider: Viral Waldron DO    Allergies: Penicillins    Isolation: Enh Drop/Con   Infection: COVID (confirmed) (07/24/22)   Code Status: No CPR   Advance Care Planning Activity    Ht: 175.3 cm (69\")   Wt: 76.4 kg (168 lb 8 oz)    Admission Cmt: None   Principal Problem: None                Active Insurance as of 7/24/2022     Primary Coverage     Payor Plan Insurance Group Employer/Plan Group    [place] [facility] [place]      Payor Plan Address Payor Plan Phone Number Payor Plan Fax Number Effective Dates    [address]   6/1/2015 - None Entered    [facility] [id number]       Subscriber Name Subscriber Birth Date Member ID       JACK GUY 1965 [id number]                 Emergency Contacts      (Rel.) Home Phone Work Phone Mobile Phone    Barbie Guy (Mother) [phone] -- [phone]    CristianStephenie (Daughter) [phone] -- --          Sirena Colon BSW       Case Management   Case Management/Social Work       Signed   Date of Service:  08/08/22 1158   Creation Time:  08/08/22 1158              Signed              Show:Clear " all  []Manual[x]Template[]Copied    Added by:  [x]Sirena Colon BSW      []Lolis for details    Discharge Planning Assessment  [facility]     Patient Name: Emilio Guy               MRN: [id number]  Today's Date: 2022                       Admit Date: 2022                Discharge Plan            Row Name 22 1156             Plan      Plan Pt admitted on 22.  SS spoke with pt on telephone on this date.  Pt stated he is doing some better but continues to feel weak and continues to want possible inpt rehab prior to discharge home.  Inpt rehab following pt.  Pt remains in Covid isolation until .  SS will follow.            Row Name 22 1047             Plan      Plan Comments Covid isol.  Persistent wheezing, cont. Prednisone, inhalers, lovenox, sats 97% on 3lnc, 1300ml uop,  bun 13/cr. 0.53, wbc 15.9. SS on board for dispo planning. Poss. Inpt rehab once out of enh.isol                      Continued Care and Services - Admitted Since 2022                 Destination                 Service Provider Request Status Selected Services Address Phone Fax Patient Preferred      [facility]  Pending - No Request Sent N/A [address] [facility] [address] [phone] --                     Sirena Colon, Viral Malhotra DO    Physician   Medicine   Progress Notes       Signed   Date of Service:  22   Creation Time:  22              Signed        Expand All Collapse All[]Expand All by Default          Show:Clear all  [x]Manual[x]Template[]Copied    Added by:  [x]Viral Waldron DO      []Lolis for details         [facility] HOSPITALIST PROGRESS NOTE     Patient Identification:  Name:  Emilio Guy  Age:  56 y.o.  Sex:  male  :  1965  MRN:  [id number]  Visit Number:  [id number]  Primary Care Provider:  Valarie Garcia APRN     Length of stay:   14     Chief complaint: None     Subjective:    Patient reports he is feeling well today and has no specific complaints.  He denies any worsening shortness of breath.  Currently on 5 L nasal cannula which is his home dose of oxygen.  Patient reports still feeling weak and not back to his baseline functioning status.  Currently pending removal of isolation so patient may go to inpatient rehab.  ----------------------------------------------------------------------------------------------------------------------  Current [facility] Meds:  albuterol sulfate HFA, 2 puff, Inhalation, 4x Daily - RT  aspirin, 81 mg, Oral, Daily  atorvastatin, 80 mg, Oral, Nightly  budesonide-formoterol, 2 puff, Inhalation, BID - RT  cetirizine, 10 mg, Oral, Daily  clopidogrel, 75 mg, Oral, Daily  enoxaparin, 40 mg, Subcutaneous, Nightly  fluticasone, 1 spray, Nasal, Daily  guaiFENesin, 1,200 mg, Oral, BID  metoprolol tartrate, 25 mg, Oral, Q12H  nicotine, 1 patch, Transdermal, Q24H  nystatin, , Topical, Q12H  pantoprazole, 40 mg, Oral, QAM AC  predniSONE, 20 mg, Oral, Daily With Breakfast  senna-docusate sodium, 2 tablet, Oral, BID  sodium chloride, 10 mL, Intravenous, Q12H  sodium chloride, 10 mL, Intravenous, Q12H        ----------------------------------------------------------------------------------------------------------------------  Vital Signs:  Temp:  [98 °F (36.7 °C)-98.5 °F (36.9 °C)] 98 °F (36.7 °C)  Heart Rate:  [63-81] 81  Resp:  [18] 18  BP: (104-127)/(53-66) 127/53  Vitals               08/06/22  0500 08/07/22  0500 08/08/22  0500   Weight: 75.4 kg (166 lb 3.2 oz) 75.9 kg (167 lb 4.8 oz) 76.4 kg (168 lb 8 oz)         Body mass index is 24.88 kg/m².     Intake/Output Summary (Last 24 hours) at 8/8/2022 1316  Last data filed at 8/8/2022 1034      Gross per 24 hour   Intake 360 ml   Output 1800 ml   Net -1440 ml      Diet Soft Texture;  Chopped  ----------------------------------------------------------------------------------------------------------------------  Physical exam:  Constitutional: Chronically ill-appearing  male in no apparent distress.     HENT:  Head:  Normocephalic and atraumatic.  Mouth:  Moist mucous membranes.    Eyes:  Conjunctivae and EOM are normal.  Pupils are equal, round, and reactive to light.  No scleral icterus.    Neck:  Neck supple. No thyromegaly.  No JVD present.    Cardiovascular:  Regular rate and rhythm with no murmurs, rubs, clicks or gallops appreciated.  Pulmonary/Chest:  Clear to auscultation bilaterally with no crackles, wheezes or rhonchi appreciated.  Abdominal:  Soft. Nontender. Nondistended  Bowel sounds are normal in all four quadrants. No organomegally appreciated.   Musculoskeletal:  5/5 muscle strength bilateral upper and lower extremities with equal muscle tone and bulk. No edema, no tenderness, and no deformity.  No red or swollen joints anywhere.    Neurological:  Alert and oriented to person, place, and time. Cranial nerves II-XII intact with no focal deficits.  No facial droop.  No slurred speech.   Skin:  Warm and dry to palpation with no rashes or lesions appreciated.  Peripheral vascular:  2+ radial and pedal pulses in bilateral upper and lower extremities.  Psychiatric:  Alert and oriented x3, demonstrates appropriate judgment and insight.  -------------------------------------------------------------------------------  ----------------------------------------------------------------------------------------------------------------------       Results from last 7 days   Lab Units 08/05/22  0422   TROPONIN T ng/mL <0.010             Results from last 7 days   Lab Units 08/08/22  0448 08/07/22  0251 08/06/22 0251   WBC 10*3/mm3 15.98* 16.59* 16.12*   HEMOGLOBIN g/dL 11.7* 11.5* 11.5*   HEMATOCRIT % 37.1* 36.4* 38.1   MCV fL 97.6* 97.6* 102.1*   MCHC g/dL 31.5 31.6 30.2*   PLATELETS  10*3/mm3 204 208 182                 Results from last 7 days   Lab Units 08/08/22  0448 08/07/22  0251 08/06/22  0251   SODIUM mmol/L 139 139 137   POTASSIUM mmol/L 4.3 4.1 4.2   CHLORIDE mmol/L 100 102 102   CO2 mmol/L 30.9* 30.3* 28.3   BUN mg/dL 13 15 14   CREATININE mg/dL 0.53* 0.46* 0.53*   CALCIUM mg/dL 8.7 8.7 8.8   GLUCOSE mg/dL 82 106* 91   ALBUMIN g/dL 3.11* 3.08* 3.09*   BILIRUBIN mg/dL 0.2 0.2 0.2   ALK PHOS U/L 47 48 45   AST (SGOT) U/L 21 19 22   ALT (SGPT) U/L 42* 38 43*   Estimated Creatinine Clearance: 170.2 mL/min (A) (by C-G formula based on SCr of 0.53 mg/dL (L)).     No results found for: AMMONIA      No results found for: BLOODCX  No results found for: URINECX  No results found for: WOUNDCX  No results found for: STOOLCX     I have personally looked at the labs and they are summarized above.  ----------------------------------------------------------------------------------------------------------------------      Imaging Results (Last 24 Hours)      ** No results found for the last 24 hours. **          ----------------------------------------------------------------------------------------------------------------------  Assessment and Plan:     1.  Acute on chronic hypoxic/hypercapnic respiratory failure -now resolved, continue current 5 L nasal cannula per patient's home dose of oxygen.     2.  COVID-19 -patient now asymptomatic and back to baseline functioning status.  We will continue thromboembolism prophylaxis.     3.  COPD exacerbation -continue prednisone 20 mg p.o. daily, slowly improving.  We will continue Symbicort and albuterol inhaler.     4.  Subacute MCA CVA -teleneurology recommends continuing aspirin, statin and Plavix.  Patient has declined MRI secondary to claustrophobia     5.  Essential hypertension -well-controlled     6.  Hyperlipidemia -statin     7.  Tobacco abuse -encourage cessation     Disposition will likely require several more days hospitalization     Viral  Skinny Waldron,    08/08/22   13:16 EDT

## 2022-08-08 NOTE — CASE MANAGEMENT/SOCIAL WORK
Discharge Planning Assessment  [facility]     Patient Name: Emilio Guy  MRN: [id number]  Today's Date: 8/8/2022    Admit Date: 7/24/2022       Discharge Plan     Row Name 08/08/22 1156       Plan    Plan Pt admitted on 7/24/22.  SS spoke with pt on telephone on this date.  Pt stated he is doing some better but continues to feel weak and continues to want possible inpt rehab prior to discharge home.  Inpt rehab following pt.  Pt remains in Covid isolation until 8/13.  SS will follow.    Row Name 08/08/22 1047       Plan    Plan Comments Covid isol.  Persistent wheezing, cont. Prednisone, inhalers, lovenox, sats 97% on 3lnc, 1300ml uop,  bun 13/cr. 0.53, wbc 15.9. SS on board for dispo planning. Poss. Inpt rehab once out of enh.isol              Continued Care and Services - Admitted Since 7/24/2022     Destination     Service Provider Request Status Selected Services Address Phone Fax Patient Preferred    [facility] ACUTE REHAB  Pending - No Request Sent N/A 1 [facility] [phone] --                HERMES DavidW

## 2022-08-08 NOTE — PROGRESS NOTES
[facility] HOSPITALIST PROGRESS NOTE     Patient Identification:  Name:  Emilio Guy  Age:  56 y.o.  Sex:  male  :  1965  MRN:  [id number]  Visit Number:  [id number]  Primary Care Provider:  Valarie Garcia APRN    Length of stay:  14    Chief complaint: None    Subjective:    Patient reports he is feeling well today and has no specific complaints.  He denies any worsening shortness of breath.  Currently on 5 L nasal cannula which is his home dose of oxygen.  Patient reports still feeling weak and not back to his baseline functioning status.  Currently pending removal of isolation so patient may go to inpatient rehab.  ----------------------------------------------------------------------------------------------------------------------  Current Hospital Meds:  albuterol sulfate HFA, 2 puff, Inhalation, 4x Daily - RT  aspirin, 81 mg, Oral, Daily  atorvastatin, 80 mg, Oral, Nightly  budesonide-formoterol, 2 puff, Inhalation, BID - RT  cetirizine, 10 mg, Oral, Daily  clopidogrel, 75 mg, Oral, Daily  enoxaparin, 40 mg, Subcutaneous, Nightly  fluticasone, 1 spray, Nasal, Daily  guaiFENesin, 1,200 mg, Oral, BID  metoprolol tartrate, 25 mg, Oral, Q12H  nicotine, 1 patch, Transdermal, Q24H  nystatin, , Topical, Q12H  pantoprazole, 40 mg, Oral, QAM AC  predniSONE, 20 mg, Oral, Daily With Breakfast  senna-docusate sodium, 2 tablet, Oral, BID  sodium chloride, 10 mL, Intravenous, Q12H  sodium chloride, 10 mL, Intravenous, Q12H         ----------------------------------------------------------------------------------------------------------------------  Vital Signs:  Temp:  [98 °F (36.7 °C)-98.5 °F (36.9 °C)] 98 °F (36.7 °C)  Heart Rate:  [63-81] 81  Resp:  [18] 18  BP: (104-127)/(53-66) 127/53      22  0500 22  0500 22  0500   Weight: 75.4 kg (166 lb 3.2 oz) 75.9 kg (167 lb 4.8 oz) 76.4 kg (168 lb 8 oz)     Body mass index is 24.88 kg/m².    Intake/Output Summary (Last 24  hours) at 8/8/2022 1316  Last data filed at 8/8/2022 1034  Gross per 24 hour   Intake 360 ml   Output 1800 ml   Net -1440 ml     Diet Soft Texture; Chopped  ----------------------------------------------------------------------------------------------------------------------  Physical exam:  Constitutional: Chronically ill-appearing  male in no apparent distress.     HENT:  Head:  Normocephalic and atraumatic.  Mouth:  Moist mucous membranes.    Eyes:  Conjunctivae and EOM are normal.  Pupils are equal, round, and reactive to light.  No scleral icterus.    Neck:  Neck supple. No thyromegaly.  No JVD present.    Cardiovascular:  Regular rate and rhythm with no murmurs, rubs, clicks or gallops appreciated.  Pulmonary/Chest:  Clear to auscultation bilaterally with no crackles, wheezes or rhonchi appreciated.  Abdominal:  Soft. Nontender. Nondistended  Bowel sounds are normal in all four quadrants. No organomegally appreciated.   Musculoskeletal:  5/5 muscle strength bilateral upper and lower extremities with equal muscle tone and bulk. No edema, no tenderness, and no deformity.  No red or swollen joints anywhere.    Neurological:  Alert and oriented to person, place, and time. Cranial nerves II-XII intact with no focal deficits.  No facial droop.  No slurred speech.   Skin:  Warm and dry to palpation with no rashes or lesions appreciated.  Peripheral vascular:  2+ radial and pedal pulses in bilateral upper and lower extremities.  Psychiatric:  Alert and oriented x3, demonstrates appropriate judgment and insight.  -------------------------------------------------------------------------------  ----------------------------------------------------------------------------------------------------------------------  Results from last 7 days   Lab Units 08/05/22  0422   TROPONIN T ng/mL <0.010     Results from last 7 days   Lab Units 08/08/22  0448 08/07/22  0251 08/06/22  0251   WBC 10*3/mm3 15.98* 16.59* 16.12*    HEMOGLOBIN g/dL 11.7* 11.5* 11.5*   HEMATOCRIT % 37.1* 36.4* 38.1   MCV fL 97.6* 97.6* 102.1*   MCHC g/dL 31.5 31.6 30.2*   PLATELETS 10*3/mm3 204 208 182         Results from last 7 days   Lab Units 08/08/22  0448 08/07/22  0251 08/06/22  0251   SODIUM mmol/L 139 139 137   POTASSIUM mmol/L 4.3 4.1 4.2   CHLORIDE mmol/L 100 102 102   CO2 mmol/L 30.9* 30.3* 28.3   BUN mg/dL 13 15 14   CREATININE mg/dL 0.53* 0.46* 0.53*   CALCIUM mg/dL 8.7 8.7 8.8   GLUCOSE mg/dL 82 106* 91   ALBUMIN g/dL 3.11* 3.08* 3.09*   BILIRUBIN mg/dL 0.2 0.2 0.2   ALK PHOS U/L 47 48 45   AST (SGOT) U/L 21 19 22   ALT (SGPT) U/L 42* 38 43*   Estimated Creatinine Clearance: 170.2 mL/min (A) (by C-G formula based on SCr of 0.53 mg/dL (L)).    No results found for: AMMONIA      No results found for: BLOODCX  No results found for: URINECX  No results found for: WOUNDCX  No results found for: STOOLCX    I have personally looked at the labs and they are summarized above.  ----------------------------------------------------------------------------------------------------------------------  Imaging Results (Last 24 Hours)     ** No results found for the last 24 hours. **        ----------------------------------------------------------------------------------------------------------------------  Assessment and Plan:    1.  Acute on chronic hypoxic/hypercapnic respiratory failure -now resolved, continue current 5 L nasal cannula per patient's home dose of oxygen.    2.  COVID-19 -patient now asymptomatic and back to baseline functioning status.  We will continue thromboembolism prophylaxis.    3.  COPD exacerbation -continue prednisone 20 mg p.o. daily, slowly improving.  We will continue Symbicort and albuterol inhaler.    4.  Subacute MCA CVA -teleneurology recommends continuing aspirin, statin and Plavix.  Patient has declined MRI secondary to claustrophobia    5.  Essential hypertension -well-controlled    6.  Hyperlipidemia -statin    7.  Tobacco  abuse -encourage cessation    Disposition will likely require several more days hospitalization    Viral Waldron DO   08/08/22   13:16 EDT

## 2022-08-08 NOTE — PROGRESS NOTES
"Palliative Care Daily Progress Note     S:    Clinically, the patient is showing slow improvement.  He remains oxygen at 5 L/min by nasal cannula.  Long-term plan is for him to go to rehab and states that if he is better, he will live alone.  If not better, he will move in and live with his mother. He is looking forward to getting off isolation to have visitors and start therapies.      O:   Palliative Performance Scale Score:     /63 (BP Location: Left arm, Patient Position: Lying)   Pulse 90   Temp 98.2 °F (36.8 °C) (Oral)   Resp 18   Ht 175.3 cm (69\")   Wt 76.4 kg (168 lb 8 oz)   SpO2 95%   BMI 24.88 kg/m²     Intake/Output Summary (Last 24 hours) at 8/8/2022 1433  Last data filed at 8/8/2022 1422  Gross per 24 hour   Intake 600 ml   Output 1800 ml   Net -1200 ml       PE:  General Appearance:    Chronically ill appearing, alert, cooperative, NAD   HEENT:    NC/AT   Neck:   supple   Lungs:    Poor airflow    Heart:    RRR   Abdomen:     Soft, nondistended   Extremities:   Moves all extremities, no edema   Pulses:   Pulses palpable    Skin:   Warm, dry   Neurologic:   A/Ox3, cooperative,  generalized weakness   Psych:   Calm, appropriate         Meds: Reviewed     Labs:   Results from last 7 days   Lab Units 08/08/22  0448   WBC 10*3/mm3 15.98*   HEMOGLOBIN g/dL 11.7*   HEMATOCRIT % 37.1*   PLATELETS 10*3/mm3 204     Results from last 7 days   Lab Units 08/08/22  0448   SODIUM mmol/L 139   POTASSIUM mmol/L 4.3   CHLORIDE mmol/L 100   CO2 mmol/L 30.9*   BUN mg/dL 13   CREATININE mg/dL 0.53*   GLUCOSE mg/dL 82   CALCIUM mg/dL 8.7     Results from last 7 days   Lab Units 08/08/22  0448   SODIUM mmol/L 139   POTASSIUM mmol/L 4.3   CHLORIDE mmol/L 100   CO2 mmol/L 30.9*   BUN mg/dL 13   CREATININE mg/dL 0.53*   CALCIUM mg/dL 8.7   BILIRUBIN mg/dL 0.2   ALK PHOS U/L 47   ALT (SGPT) U/L 42*   AST (SGOT) U/L 21   GLUCOSE mg/dL 82     Imaging Results (Last 72 Hours)     Procedure Component Value Units " Date/Time    CT Chest Without Contrast Diagnostic [[id number]] Collected: 08/07/22 1142     Updated: 08/07/22 1144    Narrative:       CT Chest WO      INDICATIONS:    pna;Acute respiratory failure with hypoxia;Acute respiratory failure with hypercapnia;COVID-19;Cerebral infarction, unspecified      COMPARISON: 7/24/2022 chest x-ray and CT chest, chest x-ray 8/5/2022    TECHNIQUE:   CT chest without IV contrast.   Coronal and sagittal reconstructions were obtained.    Radiation dose reduction techniques included automated exposure control or exposure modulation based on body size.   Count of known CT and cardiac nuc med studies performed in previous 12 months: 0.     FINDINGS:  Lack of IV contrast limits assessment of mediastinum.   No gross bulky adenopathy is present. Moderate coronary artery calcifications.  Mild emphysematous change present. Focal infiltrate seen centrally in the right middle lobe with improvement compared to July 24 but persistent airspace disease. No change in subsegmental airspace disease in the lingula. Limited evaluation due to  significant respiratory motion artifact.   Gaseous distention of the esophagus and hiatal hernia present consistent with GERD. No adrenal gland masses are seen.    No acute osseous abnormality is present..      Impression:      1. Chronic airspace disease in the right middle lobe and lingula. There is some improvement on the right. Correlate clinically and follow-up to document complete resolution recommended.  2.   Moderate coronary artery calcification present. These can be predictive of future all cause mortality and cardiovascular events and coronary artery calcification scoring may be clinically useful.     Signer Name: Kellei Islas MD   Signed: 8/7/2022 11:42 AM   Workstation Name: [facility]    [facility]            Diagnostics: Reviewed    A: Clinically, the patient is improving and is looking forward to rehabilitation services  and getting off isolation.  He is in no acute distress at the present time.      P: Continue present management.  We will continue to support.    We will continue to follow along. Please do not hesitate to contact us regarding further sx mgmt or GOC needs, including after hours or on weekends via our on call provider at [phone].     Panfilo Matamoros MD    8/8/2022

## 2022-08-08 NOTE — THERAPY EVALUATION
Acute Care - Physical Therapy Treatment Note   [facility]     Patient Name: Emilio Guy  : 1965  MRN: [id number]  Today's Date: 2022   Onset of Illness/Injury or Date of Surgery: 22  Visit Dx:     ICD-10-CM ICD-9-CM   1. Acute respiratory failure with hypoxia and hypercapnia (HCC)  J96.01 518.81    J96.02    2. COVID-19  U07.1 079.89   3. Cerebrovascular accident (CVA), unspecified mechanism (HCC)  I63.9 434.91     Patient Active Problem List   Diagnosis   • COPD with acute exacerbation (HCC)   • Acute on chronic respiratory failure with hypoxia (HCC)   • Acute respiratory failure with hypoxia and hypercapnia (HCC)     Past Medical History:   Diagnosis Date   • CHF (congestive heart failure) (HCC)    • COPD (chronic obstructive pulmonary disease) (HCC)      No past surgical history on file.  PT Assessment (last 12 hours)     PT Evaluation and Treatment     Row Name 22          Physical Therapy Time and Intention    Subjective Information complains of;fatigue  -CS     Document Type therapy note (daily note)  -CS     Mode of Treatment physical therapy  -CS     Session Not Performed --  Pt states he did not sleep well last PM and requested to rest.  -CS     Patient Effort good  -CS     Symptoms Noted During/After Treatment shortness of breath  -CS     Comment Pt seen bedside this AM.  Pt agreed to therex.  -CS     Row Name 22          General Information    Patient Profile Reviewed yes  -CS     Existing Precautions/Restrictions seizures  -CS     Row Name 22          Living Environment    Primary Care Provided by self;parent(s)  Mother assist with meals, laundry  -CS     Row Name 22          Home Use of Assistive/Adaptive Equipment    Equipment Currently Used at Home cane, quad;oxygen;nebulizer;other (see comments)  [employer] for Cane  -CS     Row Name 22          Cognition    Orientation Status (Cognition) oriented x 4  -CS     Row  Name 08/08/22 0915          Range of Motion Comprehensive    General Range of Motion no range of motion deficits identified  -     Row Name 08/08/22 0915          Strength Comprehensive (MMT)    General Manual Muscle Testing (MMT) Assessment upper extremity strength deficits identified  -     Row Name 08/08/22 0915          Vision Assessment/Intervention    Visual Impairment/Limitations WFL  -     Row Name 08/08/22 0915          Bed Mobility    Bed Mobility bed mobility (all) activities  -CS     All Activities, Moore (Bed Mobility) minimum assist (75% patient effort);contact guard  -CS     Assistive Device (Bed Mobility) bed rails;draw sheet;head of bed elevated  -     Row Name 08/08/22 0915          Transfers    Transfers sit-stand transfer;stand-sit transfer  -CS     Comment, (Transfers) SBA/CGA  -CS     Sit-Stand Moore (Transfers) contact guard  -CS     Stand-Sit Moore (Transfers) contact guard  -CS     Row Name 08/08/22 0915          Sit-Stand Transfer    Assistive Device (Sit-Stand Transfers) other (see comments)  no AD  -CS     Row Name 08/08/22 0915          Stand-Sit Transfer    Assistive Device (Stand-Sit Transfers) other (see comments)  no AD  -CS     Row Name 08/08/22 0915          Gait/Stairs (Locomotion)    Gait/Stairs Locomotion gait/ambulation independence;gait/ambulation assistive device;distance ambulated  -     Moore Level (Gait) contact guard  -CS     Assistive Device (Gait) other (see comments)  no AD, close CGA/HHA  -CS     Distance in Feet (Gait) few steps to bedside chair  -     Row Name 08/08/22 0915          Safety Issues, Functional Mobility    Impairments Affecting Function (Mobility) balance;endurance/activity tolerance;shortness of breath  -     Row Name 08/08/22 0915          Motor Skills    Therapeutic Exercise --  Pt completed trunk flex x 5 sitting in bed, then completed LAQ x 10 each LE in sitting, bridging x 10 supine.  -     Row Name  08/08/22 0915          Respiratory WDL    Respiratory WDL X  -CS     Rhythm/Pattern, Respiratory unlabored;pattern regular;depth regular;no shortness of breath reported  -CS     Expansion/Accessory Muscles/Retractions no use of accessory muscles;no retractions;expansion symmetric  -CS     Cough Frequency frequent  -CS     Cough Type productive  -CS     Row Name 08/08/22 0915          Breath Sounds    Breath Sounds All Fields  -CS     All Lung Fields Breath Sounds Anterior:;diminished  -CS     BORIS Breath Sounds Posterior:;clear  -CS     LLL Breath Sounds diminished  -CS     RUL Breath Sounds wheezes, inspiratory  -CS     RLL Breath Sounds diminished  -CS     Row Name 08/08/22 0915          Skin WDL    Skin WDL X  -CS     Skin Color/Characteristics redness blanchable  -CS     Skin Temperature warm  -CS     Skin Moisture dry  -CS     Skin Elasticity quick return to original state  -CS     Skin Integrity intact  -CS     Row Name 08/08/22 0915          Coping    Observed Emotional State calm;cooperative  -CS     Verbalized Emotional State acceptance  -CS     Family/Support Persons family  -CS     Involvement in Care not present at bedside  -CS     Row Name 08/08/22 0915          Plan of Care Review    Plan of Care Reviewed With patient  -CS     Progress no change  -CS     Outcome Evaluation Pt progressing slowly limited by endurance.  -CS     Row Name 08/08/22 0915          Positioning and Restraints    Pre-Treatment Position in bed  -CS     Post Treatment Position bed  -CS     In Bed call light within reach;encouraged to call for assist  -CS     Row Name 08/08/22 0915          Therapy Assessment/Plan (PT)    Rehab Potential (PT) fair, will monitor progress closely  -CS     Criteria for Skilled Interventions Met (PT) yes;meets criteria;skilled treatment is necessary  -CS     Therapy Frequency (PT) 3 times/wk  3-5x/week  -CS     Problem List (PT) problems related to;balance;mobility;strength  -CS     Activity Limitations  Related to Problem List (PT) unable to ambulate safely;unable to transfer safely  -     Row Name 08/08/22 0915          Progress Summary (PT)    Progress Toward Functional Goals (PT) progress toward functional goals is gradual  -CS     Daily Progress Summary (PT) Pt progressing slowly limited by endurance.  -     Row Name 08/08/22 0915          Physical Therapy Goals    Bed Mobility Goal Selection (PT) bed mobility, PT goal 1  -CS     Transfer Goal Selection (PT) transfer, PT goal 1  -CS     Gait Training Goal Selection (PT) gait training, PT goal 1  -     Row Name 08/08/22 0915          Bed Mobility Goal 1 (PT)    Activity/Assistive Device (Bed Mobility Goal 1, PT) bed mobility activities, all  -CS     Cadott Level/Cues Needed (Bed Mobility Goal 1, PT) standby assist  -CS     Time Frame (Bed Mobility Goal 1, PT) long term goal (LTG);by discharge  -     Row Name 08/08/22 0915          Transfer Goal 1 (PT)    Activity/Assistive Device (Transfer Goal 1, PT) transfers, all  -CS     Cadott Level/Cues Needed (Transfer Goal 1, PT) standby assist  -CS     Time Frame (Transfer Goal 1, PT) long term goal (LTG);by discharge  -     Row Name 08/08/22 0915          Gait Training Goal 1 (PT)    Activity/Assistive Device (Gait Training Goal 1, PT) gait (walking locomotion);assistive device use  -CS     Cadott Level (Gait Training Goal 1, PT) standby assist  -CS     Distance (Gait Training Goal 1, PT) 50'  -CS     Time Frame (Gait Training Goal 1, PT) long term goal (LTG);by discharge  -           User Key  (r) = Recorded By, (t) = Taken By, (c) = Cosigned By    Initials Name Provider Type    CS Kaiser Morley, PT Physical Therapist                Physical Therapy Education                 Title: PT OT SLP Therapies (Done)     Topic: Physical Therapy (Done)     Point: Mobility training (Done)     Learning Progress Summary           Patient Acceptance, E, VU by ML at 8/7/2022 0057      Show all  documentation for this point (11)                 Point: Home exercise program (Done)     Learning Progress Summary           Patient Acceptance, E, VU by  at 8/7/2022 0057      Show all documentation for this point (11)                 Point: Body mechanics (Done)     Learning Progress Summary           Patient Acceptance, E, VU by  at 8/7/2022 0057      Show all documentation for this point (11)                 Point: Precautions (Done)     Learning Progress Summary           Patient Acceptance, E, VU by  at 8/7/2022 0057      Show all documentation for this point (11)                             User Key     Initials Effective Dates Name Provider Type Discipline     05/10/22 -  Shwetha Lira RNA Registered Nurse Nurse              PT Recommendation and Plan  Anticipated Discharge Disposition (PT): home with assist, inpatient rehabilitation facility  Planned Therapy Interventions (PT): balance training, bed mobility training, gait training, home exercise program, neuromuscular re-education, patient/family education, strengthening, transfer training  Therapy Frequency (PT): 3 times/wk (3-5x/week)  Progress Summary (PT)  Progress Toward Functional Goals (PT): progress toward functional goals is gradual  Daily Progress Summary (PT): Pt progressing slowly limited by endurance.  Plan of Care Reviewed With: patient  Progress: no change  Outcome Evaluation: Pt progressing slowly limited by endurance.       Time Calculation:    PT Charges     Row Name 08/08/22 1124             Time Calculation    PT Received On 08/08/22  -CS      PT Goal Re-Cert Due Date 08/11/22  -CS              Timed Charges    18699 - PT Therapeutic Exercise Minutes 15  -CS      56457 - PT Therapeutic Activity Minutes 15  -CS              Total Minutes    Timed Charges Total Minutes 30  -CS       Total Minutes 30  -CS            User Key  (r) = Recorded By, (t) = Taken By, (c) = Cosigned By    Initials Name Provider Type    JACKELYN Morley  Kaiser, PT Physical Therapist              Therapy Charges for Today     Code Description Service Date Service Provider Modifiers Qty    [id number]  PT THER PROC EA 15 MIN 8/8/2022 Kaiser Morley, PT GP 1    [id number]  PT THERAPEUTIC ACT EA 15 MIN 8/8/2022 Kaiser Morley, PT GP 1               Kaiser Morley PT  8/8/2022

## 2022-08-08 NOTE — PLAN OF CARE
Goal Outcome Evaluation:      Patient resting in bed without signs, symptoms, or complaints of pain or distress. Will continue plan of care.

## 2022-08-09 LAB
ALBUMIN SERPL-MCNC: 3.21 G/DL (ref 3.5–5.2)
ALBUMIN/GLOB SERPL: 1.8 G/DL
ALP SERPL-CCNC: 52 U/L (ref 39–117)
ALT SERPL W P-5'-P-CCNC: 42 U/L (ref 1–41)
ANION GAP SERPL CALCULATED.3IONS-SCNC: 8.4 MMOL/L (ref 5–15)
AST SERPL-CCNC: 21 U/L (ref 1–40)
BASOPHILS # BLD AUTO: 0.05 10*3/MM3 (ref 0–0.2)
BASOPHILS NFR BLD AUTO: 0.3 % (ref 0–1.5)
BILIRUB SERPL-MCNC: <0.2 MG/DL (ref 0–1.2)
BUN SERPL-MCNC: 13 MG/DL (ref 6–20)
BUN/CREAT SERPL: 22.4 (ref 7–25)
CALCIUM SPEC-SCNC: 8.8 MG/DL (ref 8.6–10.5)
CHLORIDE SERPL-SCNC: 100 MMOL/L (ref 98–107)
CO2 SERPL-SCNC: 29.6 MMOL/L (ref 22–29)
CREAT SERPL-MCNC: 0.58 MG/DL (ref 0.76–1.27)
DEPRECATED RDW RBC AUTO: 46.2 FL (ref 37–54)
EGFRCR SERPLBLD CKD-EPI 2021: 115.2 ML/MIN/1.73
EOSINOPHIL # BLD AUTO: 0.25 10*3/MM3 (ref 0–0.4)
EOSINOPHIL NFR BLD AUTO: 1.5 % (ref 0.3–6.2)
ERYTHROCYTE [DISTWIDTH] IN BLOOD BY AUTOMATED COUNT: 13 % (ref 12.3–15.4)
GLOBULIN UR ELPH-MCNC: 1.8 GM/DL
GLUCOSE SERPL-MCNC: 141 MG/DL (ref 65–99)
HCT VFR BLD AUTO: 33.9 % (ref 37.5–51)
HGB BLD-MCNC: 10.7 G/DL (ref 13–17.7)
IMM GRANULOCYTES # BLD AUTO: 0.46 10*3/MM3 (ref 0–0.05)
IMM GRANULOCYTES NFR BLD AUTO: 2.7 % (ref 0–0.5)
LYMPHOCYTES # BLD AUTO: 3.58 10*3/MM3 (ref 0.7–3.1)
LYMPHOCYTES NFR BLD AUTO: 21.4 % (ref 19.6–45.3)
MCH RBC QN AUTO: 30.8 PG (ref 26.6–33)
MCHC RBC AUTO-ENTMCNC: 31.6 G/DL (ref 31.5–35.7)
MCV RBC AUTO: 97.7 FL (ref 79–97)
MONOCYTES # BLD AUTO: 0.97 10*3/MM3 (ref 0.1–0.9)
MONOCYTES NFR BLD AUTO: 5.8 % (ref 5–12)
NEUTROPHILS NFR BLD AUTO: 11.43 10*3/MM3 (ref 1.7–7)
NEUTROPHILS NFR BLD AUTO: 68.3 % (ref 42.7–76)
NRBC BLD AUTO-RTO: 0 /100 WBC (ref 0–0.2)
PLATELET # BLD AUTO: 199 10*3/MM3 (ref 140–450)
PMV BLD AUTO: 10.8 FL (ref 6–12)
POTASSIUM SERPL-SCNC: 3.6 MMOL/L (ref 3.5–5.2)
PROT SERPL-MCNC: 5 G/DL (ref 6–8.5)
RBC # BLD AUTO: 3.47 10*6/MM3 (ref 4.14–5.8)
SODIUM SERPL-SCNC: 138 MMOL/L (ref 136–145)
WBC NRBC COR # BLD: 16.74 10*3/MM3 (ref 3.4–10.8)

## 2022-08-09 PROCEDURE — 97110 THERAPEUTIC EXERCISES: CPT

## 2022-08-09 PROCEDURE — 94761 N-INVAS EAR/PLS OXIMETRY MLT: CPT

## 2022-08-09 PROCEDURE — 97535 SELF CARE MNGMENT TRAINING: CPT

## 2022-08-09 PROCEDURE — 94799 UNLISTED PULMONARY SVC/PX: CPT

## 2022-08-09 PROCEDURE — 99232 SBSQ HOSP IP/OBS MODERATE 35: CPT | Performed by: INTERNAL MEDICINE

## 2022-08-09 PROCEDURE — 63710000001 PREDNISONE PER 1 MG: Performed by: INTERNAL MEDICINE

## 2022-08-09 PROCEDURE — 80053 COMPREHEN METABOLIC PANEL: CPT | Performed by: STUDENT IN AN ORGANIZED HEALTH CARE EDUCATION/TRAINING PROGRAM

## 2022-08-09 PROCEDURE — 25010000002 ENOXAPARIN PER 10 MG: Performed by: STUDENT IN AN ORGANIZED HEALTH CARE EDUCATION/TRAINING PROGRAM

## 2022-08-09 PROCEDURE — 97530 THERAPEUTIC ACTIVITIES: CPT

## 2022-08-09 PROCEDURE — 85025 COMPLETE CBC W/AUTO DIFF WBC: CPT | Performed by: STUDENT IN AN ORGANIZED HEALTH CARE EDUCATION/TRAINING PROGRAM

## 2022-08-09 PROCEDURE — 94660 CPAP INITIATION&MGMT: CPT

## 2022-08-09 RX ORDER — PREDNISONE 10 MG/1
10 TABLET ORAL
Status: DISCONTINUED | OUTPATIENT
Start: 2022-08-10 | End: 2022-08-11 | Stop reason: HOSPADM

## 2022-08-09 RX ADMIN — METOPROLOL TARTRATE 25 MG: 25 TABLET, FILM COATED ORAL at 08:44

## 2022-08-09 RX ADMIN — NYSTATIN: 100000 POWDER TOPICAL at 08:47

## 2022-08-09 RX ADMIN — Medication 10 ML: at 08:42

## 2022-08-09 RX ADMIN — CLOPIDOGREL 75 MG: 75 TABLET, FILM COATED ORAL at 08:43

## 2022-08-09 RX ADMIN — ASPIRIN 81 MG: 81 TABLET, CHEWABLE ORAL at 08:43

## 2022-08-09 RX ADMIN — PREDNISONE 20 MG: 20 TABLET ORAL at 08:43

## 2022-08-09 RX ADMIN — PANTOPRAZOLE SODIUM 40 MG: 40 TABLET, DELAYED RELEASE ORAL at 08:43

## 2022-08-09 RX ADMIN — ALBUTEROL SULFATE 2 PUFF: 90 AEROSOL, METERED RESPIRATORY (INHALATION) at 06:34

## 2022-08-09 RX ADMIN — DOCUSATE SODIUM 50 MG AND SENNOSIDES 8.6 MG 2 TABLET: 8.6; 5 TABLET, FILM COATED ORAL at 08:43

## 2022-08-09 RX ADMIN — ENOXAPARIN SODIUM 40 MG: 40 INJECTION SUBCUTANEOUS at 21:45

## 2022-08-09 RX ADMIN — ALBUTEROL SULFATE 2 PUFF: 90 AEROSOL, METERED RESPIRATORY (INHALATION) at 12:06

## 2022-08-09 RX ADMIN — BUDESONIDE AND FORMOTEROL FUMARATE DIHYDRATE 2 PUFF: 160; 4.5 AEROSOL RESPIRATORY (INHALATION) at 06:34

## 2022-08-09 RX ADMIN — DOCUSATE SODIUM 50 MG AND SENNOSIDES 8.6 MG 2 TABLET: 8.6; 5 TABLET, FILM COATED ORAL at 21:45

## 2022-08-09 RX ADMIN — FLUTICASONE PROPIONATE 1 SPRAY: 50 SPRAY, METERED NASAL at 08:46

## 2022-08-09 RX ADMIN — NICOTINE TRANSDERMAL SYSTEM 1 PATCH: 21 PATCH, EXTENDED RELEASE TRANSDERMAL at 08:46

## 2022-08-09 RX ADMIN — GUAIFENESIN 1200 MG: 600 TABLET, EXTENDED RELEASE ORAL at 08:43

## 2022-08-09 RX ADMIN — BUDESONIDE AND FORMOTEROL FUMARATE DIHYDRATE 2 PUFF: 160; 4.5 AEROSOL RESPIRATORY (INHALATION) at 18:55

## 2022-08-09 RX ADMIN — Medication 10 ML: at 08:47

## 2022-08-09 RX ADMIN — ATORVASTATIN CALCIUM 80 MG: 40 TABLET, FILM COATED ORAL at 21:45

## 2022-08-09 RX ADMIN — GUAIFENESIN 1200 MG: 600 TABLET, EXTENDED RELEASE ORAL at 21:44

## 2022-08-09 RX ADMIN — CETIRIZINE HYDROCHLORIDE 10 MG: 10 TABLET, FILM COATED ORAL at 08:44

## 2022-08-09 RX ADMIN — NYSTATIN 1 APPLICATION: 100000 POWDER TOPICAL at 21:45

## 2022-08-09 NOTE — PROGRESS NOTES
"    [facility] HOSPITALIST PROGRESS NOTE     Patient Identification:  Name:  Emilio Guy  Age:  56 y.o.  Sex:  male  :  1965  MRN:  [id number]  Visit Number:  [id number]  Primary Care Provider:  Valarie Garcia APRN    Length of stay:  15    Chief complaint: None    Subjective:    Patient continues to do well today, still on 5 L nasal cannula which is his home dose of oxygen.  No new events overnight.  Patient reports feeling slightly stronger today compared to yesterday.  He also states \"this is the best they have had since the whole time I been here\".  ----------------------------------------------------------------------------------------------------------------------  Current [facility] Meds:  albuterol sulfate HFA, 2 puff, Inhalation, 4x Daily - RT  aspirin, 81 mg, Oral, Daily  atorvastatin, 80 mg, Oral, Nightly  budesonide-formoterol, 2 puff, Inhalation, BID - RT  cetirizine, 10 mg, Oral, Daily  clopidogrel, 75 mg, Oral, Daily  enoxaparin, 40 mg, Subcutaneous, Nightly  fluticasone, 1 spray, Nasal, Daily  guaiFENesin, 1,200 mg, Oral, BID  metoprolol tartrate, 25 mg, Oral, Q12H  nicotine, 1 patch, Transdermal, Q24H  nystatin, , Topical, Q12H  pantoprazole, 40 mg, Oral, QAM AC  predniSONE, 20 mg, Oral, Daily With Breakfast  senna-docusate sodium, 2 tablet, Oral, BID  sodium chloride, 10 mL, Intravenous, Q12H  sodium chloride, 10 mL, Intravenous, Q12H         ----------------------------------------------------------------------------------------------------------------------  Vital Signs:  Temp:  [97.8 °F (36.6 °C)-98.4 °F (36.9 °C)] 98 °F (36.7 °C)  Heart Rate:  [72-92] 92  Resp:  [18] 18  BP: (102-121)/(63-69) 102/63      22  0500 22  0500 22  0500   Weight: 75.9 kg (167 lb 4.8 oz) 76.4 kg (168 lb 8 oz) 77.2 kg (170 lb 3.2 oz)     Body mass index is 25.13 kg/m².    Intake/Output Summary (Last 24 hours) at 2022 1221  Last data filed at 2022 0958  Gross per " 24 hour   Intake 480 ml   Output 1950 ml   Net -1470 ml     Diet Soft Texture; Chopped  ----------------------------------------------------------------------------------------------------------------------  Physical exam:  Constitutional: Chronically ill-appearing  male in no apparent distress.     HENT:  Head:  Normocephalic and atraumatic.  Mouth:  Moist mucous membranes.    Eyes:  Conjunctivae and EOM are normal.  Pupils are equal, round, and reactive to light.  No scleral icterus.    Neck:  Neck supple. No thyromegaly.  No JVD present.    Cardiovascular:  Regular rate and rhythm with no murmurs, rubs, clicks or gallops appreciated.  Pulmonary/Chest:  Clear to auscultation bilaterally with no crackles, wheezes or rhonchi appreciated.  Abdominal:  Soft. Nontender. Nondistended  Bowel sounds are normal in all four quadrants. No organomegally appreciated.   Musculoskeletal:  5/5 muscle strength bilateral upper and lower extremities with equal muscle tone and bulk. No edema, no tenderness, and no deformity.  No red or swollen joints anywhere.    Neurological:  Alert and oriented to person, place, and time. Cranial nerves II-XII intact with no focal deficits.  No facial droop.  No slurred speech.   Skin:  Warm and dry to palpation with no rashes or lesions appreciated.  Peripheral vascular:  2+ radial and pedal pulses in bilateral upper and lower extremities.  Psychiatric:  Alert and oriented x3, demonstrates appropriate judgment and insight.  -------------------------------------------------------------------------------  ----------------------------------------------------------------------------------------------------------------------  Results from last 7 days   Lab Units 08/05/22  0422   TROPONIN T ng/mL <0.010     Results from last 7 days   Lab Units 08/09/22  0143 08/08/22  0448 08/07/22  0251   WBC 10*3/mm3 16.74* 15.98* 16.59*   HEMOGLOBIN g/dL 10.7* 11.7* 11.5*   HEMATOCRIT % 33.9* 37.1* 36.4*    MCV fL 97.7* 97.6* 97.6*   MCHC g/dL 31.6 31.5 31.6   PLATELETS 10*3/mm3 199 204 208         Results from last 7 days   Lab Units 08/09/22  0143 08/08/22  0448 08/07/22  0251   SODIUM mmol/L 138 139 139   POTASSIUM mmol/L 3.6 4.3 4.1   CHLORIDE mmol/L 100 100 102   CO2 mmol/L 29.6* 30.9* 30.3*   BUN mg/dL 13 13 15   CREATININE mg/dL 0.58* 0.53* 0.46*   CALCIUM mg/dL 8.8 8.7 8.7   GLUCOSE mg/dL 141* 82 106*   ALBUMIN g/dL 3.21* 3.11* 3.08*   BILIRUBIN mg/dL <0.2 0.2 0.2   ALK PHOS U/L 52 47 48   AST (SGOT) U/L 21 21 19   ALT (SGPT) U/L 42* 42* 38   Estimated Creatinine Clearance: 157.1 mL/min (A) (by C-G formula based on SCr of 0.58 mg/dL (L)).    No results found for: AMMONIA      No results found for: BLOODCX  No results found for: URINECX  No results found for: WOUNDCX  No results found for: STOOLCX    I have personally looked at the labs and they are summarized above.  ----------------------------------------------------------------------------------------------------------------------  Imaging Results (Last 24 Hours)     ** No results found for the last 24 hours. **        ----------------------------------------------------------------------------------------------------------------------  Assessment and Plan:    1.  Acute on chronic hypoxic/hypercapnic respiratory failure -now resolved, continue current 5 L nasal cannula per patient's home dose of oxygen.    2.  COVID-19 -patient now asymptomatic and back to baseline functioning status.  We will continue thromboembolism prophylaxis.    3.  COPD exacerbation -we will decrease prednisone to 10 mg p.o. daily, continue Symbicort and albuterol inhaler continue prednisone 20 mg p.o. daily, slowly improving.  We will continue Symbicort and albuterol inhaler.    4.  Subacute MCA CVA -teleneurology recommends continuing aspirin, statin and Plavix.  Patient has declined MRI secondary to claustrophobia    5.  Essential hypertension -well-controlled    6.   Hyperlipidemia -statin    7.  Tobacco abuse -encourage cessation    8.  General deconditioning -patient requesting inpatient rehab placement when removed from isolation.  We will continue PT/OT until that time.    Disposition will likely require several more days hospitalization    Viral Waldron DO   08/09/22   12:21 EDT

## 2022-08-09 NOTE — THERAPY TREATMENT NOTE
Acute Care - Physical Therapy Treatment Note   [facility]     Patient Name: Emilio Guy  : 1965  MRN: [id number]  Today's Date: 2022   Onset of Illness/Injury or Date of Surgery: 22  Visit Dx:     ICD-10-CM ICD-9-CM   1. Acute respiratory failure with hypoxia and hypercapnia (HCC)  J96.01 518.81    J96.02    2. COVID-19  U07.1 079.89   3. Cerebrovascular accident (CVA), unspecified mechanism (HCC)  I63.9 434.91     Patient Active Problem List   Diagnosis   • COPD with acute exacerbation (HCC)   • Acute on chronic respiratory failure with hypoxia (HCC)   • Acute respiratory failure with hypoxia and hypercapnia (HCC)     Past Medical History:   Diagnosis Date   • CHF (congestive heart failure) (HCC)    • COPD (chronic obstructive pulmonary disease) (HCC)      No past surgical history on file.  PT Assessment (last 12 hours)     PT Evaluation and Treatment     Row Name 22          Physical Therapy Time and Intention    Document Type therapy note (daily note)  -CS     Mode of Treatment physical therapy  -CS     Patient Effort good  -CS     Symptoms Noted During/After Treatment shortness of breath  -CS     Comment Pt seen bedside this AM.  Pt agreed to therex.  -CS     Row Name 22 0946          General Information    Patient Profile Reviewed yes  -CS     Existing Precautions/Restrictions seizures  -CS     Row Name 22 09          Living Environment    Primary Care Provided by self;parent(s)  Mother assist with meals, laundry  -CS     Row Name 22 0946          Home Use of Assistive/Adaptive Equipment    Equipment Currently Used at Home cane, quad;oxygen;nebulizer;other (see comments)  [employer] [facility] [employer] Cane  -CS     Row Name 22 0946          Cognition    Orientation Status (Cognition) oriented x 4  -CS     Row Name 22 0946          Range of Motion Comprehensive    General Range of Motion no range of motion deficits identified  -CS     Row Name 22  0946          Strength Comprehensive (MMT)    General Manual Muscle Testing (MMT) Assessment upper extremity strength deficits identified  -     Row Name 08/09/22 0946          Vision Assessment/Intervention    Visual Impairment/Limitations WFL  -     Row Name 08/09/22 0946          Bed Mobility    Bed Mobility bed mobility (all) activities  -     All Activities, [place] (Bed Mobility) minimum assist (75% patient effort);contact guard  -CS     Assistive Device (Bed Mobility) bed rails;draw sheet;head of bed elevated  -     Row Name 08/09/22 0946          Transfers    Transfers sit-stand transfer;stand-sit transfer  -CS     Sit-Stand [place] (Transfers) contact guard  -CS     Stand-Sit [place] (Transfers) contact guard  -CS     Row Name 08/09/22 0946          Sit-Stand Transfer    Assistive Device (Sit-Stand Transfers) other (see comments)  no AD  -     Row Name 08/09/22 0946          Stand-Sit Transfer    Assistive Device (Stand-Sit Transfers) other (see comments)  no AD  -     Row Name 08/09/22 0946          Gait/Stairs (Locomotion)    Gait/Stairs Locomotion gait/ambulation independence;gait/ambulation assistive device;distance ambulated  -     [place] Level (Gait) contact guard  -CS     Assistive Device (Gait) other (see comments)  no AD, close CGA/HHA  -CS     Distance in Feet (Gait) few steps to BSC  -CS     Comment, (Gait/Stairs) Pt c/o fatigue and returned to sitting EOB.  -     Row Name 08/09/22 0946          Safety Issues, Functional Mobility    Impairments Affecting Function (Mobility) balance;endurance/activity tolerance;shortness of breath  -     Row Name 08/09/22 0946          Motor Skills    Therapeutic Exercise --  Pt completed trunk flex x 10 sitting in bed, then completed LAQ x 10 each LE in sitting, bridging x 10 supine.  -     Row Name 08/09/22 0946          Respiratory WDL    Respiratory WDL X  -CS     Rhythm/Pattern, Respiratory unlabored;pattern  regular;depth regular;no shortness of breath reported  -CS     Expansion/Accessory Muscles/Retractions no use of accessory muscles;no retractions;expansion symmetric  -CS     Cough Frequency frequent  -CS     Cough Type productive  -CS     Row Name 08/09/22 0946          Breath Sounds    Breath Sounds All Fields  -CS     All Lung Fields Breath Sounds Anterior:;diminished  -CS     BORIS Breath Sounds Posterior:;clear  -CS     LLL Breath Sounds diminished  -CS     RUL Breath Sounds wheezes, inspiratory  -CS     RLL Breath Sounds diminished  -CS     Row Name 08/09/22 0946          Skin WDL    Skin WDL X  -CS     Skin Color/Characteristics redness blanchable  -CS     Skin Temperature warm  -CS     Skin Moisture dry  -CS     Skin Elasticity quick return to original state  -CS     Skin Integrity intact  -CS     Row Name 08/09/22 0946          Coping    Observed Emotional State calm;cooperative  -CS     Verbalized Emotional State acceptance  -CS     Family/Support Persons family  -CS     Involvement in Care not present at bedside  -CS     Row Name 08/09/22 0946          Vital Signs    Pre SpO2 (%) 97  -CS     O2 Delivery Pre Treatment supplemental O2  -CS     Intra SpO2 (%) 98  -CS     O2 Delivery Intra Treatment supplemental O2  -CS     Post SpO2 (%) 97  -CS     O2 Delivery Post Treatment supplemental O2  -CS     Row Name 08/09/22 0946          Positioning and Restraints    Pre-Treatment Position in bed  -CS     Post Treatment Position bed  -CS     In Bed call light within reach  -CS     Row Name 08/09/22 0946          Therapy Assessment/Plan (PT)    Rehab Potential (PT) fair, will monitor progress closely  -CS     Criteria for Skilled Interventions Met (PT) yes;meets criteria;skilled treatment is necessary  -CS     Therapy Frequency (PT) 3 times/wk  3-5x/week  -CS     Problem List (PT) problems related to;balance;mobility;strength  -CS     Activity Limitations Related to Problem List (PT) unable to ambulate safely;unable  to transfer safely  -CS     Row Name 08/09/22 0946          Progress Summary (PT)    Progress Toward Functional Goals (PT) progress toward functional goals is gradual  -     Row Name 08/09/22 0946          Physical Therapy Goals    Bed Mobility Goal Selection (PT) bed mobility, PT goal 1  -CS     Transfer Goal Selection (PT) transfer, PT goal 1  -CS     Gait Training Goal Selection (PT) gait training, PT goal 1  -CS     Row Name 08/09/22 0946          Bed Mobility Goal 1 (PT)    Activity/Assistive Device (Bed Mobility Goal 1, PT) bed mobility activities, all  -CS     [place] Level/Cues Needed (Bed Mobility Goal 1, PT) standby assist  -CS     Time Frame (Bed Mobility Goal 1, PT) long term goal (LTG);by discharge  -     Row Name 08/09/22 0946          Transfer Goal 1 (PT)    Activity/Assistive Device (Transfer Goal 1, PT) transfers, all  -CS     [place] Level/Cues Needed (Transfer Goal 1, PT) standby assist  -CS     Time Frame (Transfer Goal 1, PT) long term goal (LTG);by discharge  -     Row Name 08/09/22 0946          Gait Training Goal 1 (PT)    Activity/Assistive Device (Gait Training Goal 1, PT) gait (walking locomotion);assistive device use  -CS     [place] Level (Gait Training Goal 1, PT) standby assist  -CS     Distance (Gait Training Goal 1, PT) 50'  -CS     Time Frame (Gait Training Goal 1, PT) long term goal (LTG);by discharge  -CS           User Key  (r) = Recorded By, (t) = Taken By, (c) = Cosigned By    Initials Name Provider Type    Kaiser Hays, PT Physical Therapist                Physical Therapy Education                 Title: PT OT SLP Therapies (Done)     Topic: Physical Therapy (Done)     Point: Mobility training (Done)     Learning Progress Summary           Patient Acceptance, E, VU by ML at 8/7/2022 0057      Show all documentation for this point (11)                 Point: Home exercise program (Done)     Learning Progress Summary           Patient Acceptance, E, VU  by ML at 8/7/2022 0057      Show all documentation for this point (11)                 Point: Body mechanics (Done)     Learning Progress Summary           Patient Acceptance, E, VU by  at 8/7/2022 0057      Show all documentation for this point (11)                 Point: Precautions (Done)     Learning Progress Summary           Patient Acceptance, E, VU by ML at 8/7/2022 0057      Show all documentation for this point (11)                             User Key     Initials Effective Dates Name Provider Type Discipline     05/10/22 -  Shwetha Lira RNA Registered Nurse Nurse              PT Recommendation and Plan  Anticipated Discharge Disposition (PT): home with assist, inpatient rehabilitation facility  Planned Therapy Interventions (PT): balance training, bed mobility training, gait training, home exercise program, neuromuscular re-education, patient/family education, strengthening, transfer training  Therapy Frequency (PT): 3 times/wk (3-5x/week)  Progress Summary (PT)  Progress Toward Functional Goals (PT): progress toward functional goals is gradual  Daily Progress Summary (PT): Pt progressing slowly limited by endurance.  Plan of Care Reviewed With: patient  Progress: no change  Outcome Evaluation: Pt progressing slowly limited by endurance.       Time Calculation:    PT Charges     Row Name 08/09/22 0948             Time Calculation    PT Received On 08/09/22  -CS      PT Goal Re-Cert Due Date 08/11/22  -CS              Timed Charges    08040 - PT Therapeutic Exercise Minutes 15  -CS      48507 - PT Therapeutic Activity Minutes 15  -CS              Total Minutes    Timed Charges Total Minutes 30  -CS       Total Minutes 30  -CS            User Key  (r) = Recorded By, (t) = Taken By, (c) = Cosigned By    Initials Name Provider Type    Kaiser Hays, PT Physical Therapist              Therapy Charges for Today     Code Description Service Date Service Provider Modifiers Qty    36517274751  PT  THER PROC EA 15 MIN 8/8/2022 Kaiser Morley, PT GP 1    24383210531 HC PT THERAPEUTIC ACT EA 15 MIN 8/8/2022 Kaiser Morley, PT GP 1    58641979273 HC PT THER PROC EA 15 MIN 8/9/2022 Kaiser Morley, PT GP 1    [id number]  PT THERAPEUTIC ACT EA 15 MIN 8/9/2022 Kaiser Morley, PT GP 1               Kaiser Morley, PT  8/9/2022

## 2022-08-09 NOTE — PROGRESS NOTES
" LOS: 15 days     Chief Complaint:  Pulmonology is following for respiratory failure    Subjective     Interval History:     Continues on supplemental oxygen flow of 5 L/min.  No acute events overnight.  Undergoing PT/OT as tolerated.    Review of Systems:     Positive for SOB   Otherwise ROS were reviewed and were negative      Objective     Vital Signs  /63 (BP Location: Right arm, Patient Position: Lying)   Pulse 92   Temp 98 °F (36.7 °C) (Oral)   Resp 18   Ht 175.3 cm (69\")   Wt 77.2 kg (170 lb 3.2 oz)   SpO2 96%   BMI 25.13 kg/m²      Physical Exam:                Patient was seen and examined via telemedicine.     General- normal in appearance, not in any acute distress    HEENT- no scleral icterus    Neck-supple    Respiratory-respirations normal-  Cardiovascular-  NSR    GI-nondistended     CNS-nonfocal    Musculoskeletal -no visible edema  Extremities- no obvious deformity noticed     Psychiatric-  Skin- no visible rash     Results Review:   I reviewed the patient's new clinical results.  I reviewed the patient's new imaging results and agree with the interpretation.        CBC    CBC 8/7/22 8/8/22 8/9/22   WBC 16.59 (A) 15.98 (A) 16.74 (A)   RBC 3.73 (A) 3.80 (A) 3.47 (A)   Hemoglobin 11.5 (A) 11.7 (A) 10.7 (A)   Hematocrit 36.4 (A) 37.1 (A) 33.9 (A)   MCV 97.6 (A) 97.6 (A) 97.7 (A)   MCH 30.8 30.8 30.8   MCHC 31.6 31.5 31.6   RDW 12.9 13.0 13.0   Platelets 208 204 199   (A) Abnormal value                CMP    CMP 8/7/22 8/8/22 8/9/22   Glucose 106 (A) 82 141 (A)   BUN 15 13 13   Creatinine 0.46 (A) 0.53 (A) 0.58 (A)   Sodium 139 139 138   Potassium 4.1 4.3 3.6   Chloride 102 100 100   Calcium 8.7 8.7 8.8   Albumin 3.08 (A) 3.11 (A) 3.21 (A)   Total Bilirubin 0.2 0.2 <0.2   Alkaline Phosphatase 48 47 52   AST (SGOT) 19 21 21   ALT (SGPT) 38 42 (A) 42 (A)   (A) Abnormal value       Comments are available for some flowsheets but are not being displayed.               No results found for: SITE, " ALLENTEST, PHART, LQY0HRS, PO2ART, BES8IIF, BASEEXCESS, P8HQHLUM, HGBBG, HCTABG, OXYHEMOGLOBI, METHHGBN, CARBOXYHGB, CO2CT, BAROMETRIC, MODALITY, FIO2        Medication Review:   Scheduled Medications:  albuterol sulfate HFA, 2 puff, Inhalation, 4x Daily - RT  aspirin, 81 mg, Oral, Daily  atorvastatin, 80 mg, Oral, Nightly  budesonide-formoterol, 2 puff, Inhalation, BID - RT  cetirizine, 10 mg, Oral, Daily  clopidogrel, 75 mg, Oral, Daily  enoxaparin, 40 mg, Subcutaneous, Nightly  fluticasone, 1 spray, Nasal, Daily  guaiFENesin, 1,200 mg, Oral, BID  metoprolol tartrate, 25 mg, Oral, Q12H  nicotine, 1 patch, Transdermal, Q24H  nystatin, , Topical, Q12H  pantoprazole, 40 mg, Oral, QAM AC  [START ON 8/10/2022] predniSONE, 10 mg, Oral, Daily With Breakfast  senna-docusate sodium, 2 tablet, Oral, BID  sodium chloride, 10 mL, Intravenous, Q12H  sodium chloride, 10 mL, Intravenous, Q12H      Continuous infusions:         Assessment:   Acute on chronic hypoxic and hypercapnic respiratory failure  COPD exacerbation  Community-acquired pneumonia  COVID-19 positivity   COPD  Chronic diastolic heart failure  Mediastinal adenopathy  Tobacco abuse     History of alcohol use  Encephalopathy  Subacute stroke/chronic appearing left cerebral infarction      Plan:   Currently requiring 5 L nasal cannula, reported baseline.   Titrate FiO2 to maintain SPO2 90% or greater.  Previously received a home NIV device, but was noncompliant.  Completed steroid course.  Completed doxycycline, cefepime  Completed Remdesivir  Continue Mucinex for congestion.  Can complete CT imaging in outpatient setting for adenopathy follow-up.  On Symbicort inhaler  On albuterol inhaler  Net negative fluid balance.  On NicoDerm patches.  GI prophylaxis: Pantoprazole  DVT prophylaxis: Lovenox   Agree with PT/OT as tolerated.  Will repeat chest xray      Oly Islas PA-C  08/09/22  12:45 EDT  Case d/w nurse and team   This service is provided via audio video  tele medicine   I am in [facility] and patient is in [facility]      I, Bennie Espinosa M.D. attest that the above note accurately reflects the work and decisions made  by me.  Patient was seen and evaluated by Dr. Espinosa, including history of present illness, physical exam, assessment, and treatment plan.  The above note was reviewed and edited by Dr. Espinosa.   Scribed for Dr. Espinosa by Oly Islas PA-C

## 2022-08-09 NOTE — CASE MANAGEMENT/SOCIAL WORK
Discharge Planning Assessment  [facility]     Patient Name: Emilio Guy  MRN: [id number]  Today's Date: 8/9/2022    Admit Date: 7/24/2022       Discharge Plan     Row Name 08/09/22 1554       Plan    Plan SS noted inpt rehab has submitted pt's information to insurance for possible admit.  SS will follow and assist with discharge needs.              Continued Care and Services - Admitted Since 7/24/2022     Destination     Service Provider Request Status Selected Services Address Phone Fax Patient Preferred    [facility] [place]  Pending - No Request Sent N/A [address] [place] [address] [phone] --                       HERMES DavidW

## 2022-08-09 NOTE — NURSING NOTE
Rehab consult follow up:  Prior auth request for IRF has been submitted to [facility] via [employer]ajeet.  Ref# [id number].  Will notify when determination is received.

## 2022-08-09 NOTE — THERAPY TREATMENT NOTE
Acute Care - Occupational Therapy Treatment Note  [facility]     Patient Name: Emilio Guy  : 1965  MRN: [id number]  Today's Date: 2022  Onset of Illness/Injury or Date of Surgery: 22     Referring Physician: Adelso    Admit Date: 2022       ICD-10-CM ICD-9-CM   1. Acute respiratory failure with hypoxia and hypercapnia (HCC)  J96.01 518.81    J96.02    2. COVID-19  U07.1 079.89   3. Cerebrovascular accident (CVA), unspecified mechanism (HCC)  I63.9 434.91     Patient Active Problem List   Diagnosis   • COPD with acute exacerbation (HCC)   • Acute on chronic respiratory failure with hypoxia (HCC)   • Acute respiratory failure with hypoxia and hypercapnia (HCC)     Past Medical History:   Diagnosis Date   • CHF (congestive heart failure) (HCC)    • COPD (chronic obstructive pulmonary disease) (HCC)      No past surgical history on file.      OT ASSESSMENT FLOWSHEET (last 12 hours)     OT Evaluation and Treatment     Row Name 22 1112                   OT Time and Intention    Subjective Information complains of;weakness;fatigue  -LM        Document Type therapy note (daily note)  -LM        Mode of Treatment occupational therapy  -LM        Patient Effort adequate  -LM        Comment Patient seen this date for adl retraining/educaiton.  Patient currently requires supervision/cga with toileting and toilet transfers to bedside commode.  Patient able to sit on eob and perform light badl with setup/cga.  Patient's activity tolerance is primary barrier to indepenence.  BUE arom wfl.  -LM                  General Information    Existing Precautions/Restrictions fall;oxygen therapy device and L/min;seizures  -LM                  Cognition    Orientation Status (Cognition) oriented x 4  -LM                  Positioning and Restraints    Post Treatment Position bed  -LM        In Bed call light within reach;encouraged to call for assist  -LM              User Key  (r) = Recorded By, (t) = Taken  By, (c) = Cosigned By    Initials Name Effective Dates    LM Sanna Garces OT 06/16/21 -                        OT Recommendation and Plan  Therapy Frequency (OT): other (see comments) (prn and/or to monitor fxl progress)  Plan of Care Review  Plan of Care Reviewed With: patient  Plan of Care Reviewed With: patient        Time Calculation:     Therapy Charges for Today     Code Description Service Date Service Provider Modifiers Qty    56152528608 HC OT SELF CARE/MGMT/TRAIN EA 15 MIN 8/9/2022 Sanna Garces, OT GO 1               Sanna Garces OT  8/9/2022

## 2022-08-10 LAB
ALBUMIN SERPL-MCNC: 3.11 G/DL (ref 3.5–5.2)
ALBUMIN/GLOB SERPL: 1.6 G/DL
ALP SERPL-CCNC: 49 U/L (ref 39–117)
ALT SERPL W P-5'-P-CCNC: 46 U/L (ref 1–41)
ANION GAP SERPL CALCULATED.3IONS-SCNC: 8.8 MMOL/L (ref 5–15)
AST SERPL-CCNC: 22 U/L (ref 1–40)
BASOPHILS # BLD AUTO: 0.07 10*3/MM3 (ref 0–0.2)
BASOPHILS NFR BLD AUTO: 0.4 % (ref 0–1.5)
BILIRUB SERPL-MCNC: 0.2 MG/DL (ref 0–1.2)
BUN SERPL-MCNC: 11 MG/DL (ref 6–20)
BUN/CREAT SERPL: 19.3 (ref 7–25)
CALCIUM SPEC-SCNC: 9.1 MG/DL (ref 8.6–10.5)
CHLORIDE SERPL-SCNC: 102 MMOL/L (ref 98–107)
CO2 SERPL-SCNC: 28.2 MMOL/L (ref 22–29)
CREAT SERPL-MCNC: 0.57 MG/DL (ref 0.76–1.27)
DEPRECATED RDW RBC AUTO: 46.7 FL (ref 37–54)
EGFRCR SERPLBLD CKD-EPI 2021: 115.8 ML/MIN/1.73
EOSINOPHIL # BLD AUTO: 0.28 10*3/MM3 (ref 0–0.4)
EOSINOPHIL NFR BLD AUTO: 1.7 % (ref 0.3–6.2)
ERYTHROCYTE [DISTWIDTH] IN BLOOD BY AUTOMATED COUNT: 13.1 % (ref 12.3–15.4)
GLOBULIN UR ELPH-MCNC: 2 GM/DL
GLUCOSE SERPL-MCNC: 171 MG/DL (ref 65–99)
HCT VFR BLD AUTO: 37.5 % (ref 37.5–51)
HGB BLD-MCNC: 11.9 G/DL (ref 13–17.7)
IMM GRANULOCYTES # BLD AUTO: 0.46 10*3/MM3 (ref 0–0.05)
IMM GRANULOCYTES NFR BLD AUTO: 2.7 % (ref 0–0.5)
LYMPHOCYTES # BLD AUTO: 4.03 10*3/MM3 (ref 0.7–3.1)
LYMPHOCYTES NFR BLD AUTO: 23.9 % (ref 19.6–45.3)
MCH RBC QN AUTO: 30.7 PG (ref 26.6–33)
MCHC RBC AUTO-ENTMCNC: 31.7 G/DL (ref 31.5–35.7)
MCV RBC AUTO: 96.9 FL (ref 79–97)
MONOCYTES # BLD AUTO: 0.81 10*3/MM3 (ref 0.1–0.9)
MONOCYTES NFR BLD AUTO: 4.8 % (ref 5–12)
NEUTROPHILS NFR BLD AUTO: 11.19 10*3/MM3 (ref 1.7–7)
NEUTROPHILS NFR BLD AUTO: 66.5 % (ref 42.7–76)
NRBC BLD AUTO-RTO: 0 /100 WBC (ref 0–0.2)
PLATELET # BLD AUTO: 188 10*3/MM3 (ref 140–450)
PMV BLD AUTO: 10.5 FL (ref 6–12)
POTASSIUM SERPL-SCNC: 3.6 MMOL/L (ref 3.5–5.2)
PROT SERPL-MCNC: 5.1 G/DL (ref 6–8.5)
RBC # BLD AUTO: 3.87 10*6/MM3 (ref 4.14–5.8)
SODIUM SERPL-SCNC: 139 MMOL/L (ref 136–145)
WBC NRBC COR # BLD: 16.84 10*3/MM3 (ref 3.4–10.8)

## 2022-08-10 PROCEDURE — 94761 N-INVAS EAR/PLS OXIMETRY MLT: CPT

## 2022-08-10 PROCEDURE — 85025 COMPLETE CBC W/AUTO DIFF WBC: CPT | Performed by: STUDENT IN AN ORGANIZED HEALTH CARE EDUCATION/TRAINING PROGRAM

## 2022-08-10 PROCEDURE — 80053 COMPREHEN METABOLIC PANEL: CPT | Performed by: STUDENT IN AN ORGANIZED HEALTH CARE EDUCATION/TRAINING PROGRAM

## 2022-08-10 PROCEDURE — 97116 GAIT TRAINING THERAPY: CPT

## 2022-08-10 PROCEDURE — 63710000001 PREDNISONE PER 5 MG: Performed by: INTERNAL MEDICINE

## 2022-08-10 PROCEDURE — 94799 UNLISTED PULMONARY SVC/PX: CPT

## 2022-08-10 PROCEDURE — 99232 SBSQ HOSP IP/OBS MODERATE 35: CPT | Performed by: INTERNAL MEDICINE

## 2022-08-10 PROCEDURE — 94660 CPAP INITIATION&MGMT: CPT

## 2022-08-10 PROCEDURE — 97110 THERAPEUTIC EXERCISES: CPT

## 2022-08-10 PROCEDURE — 25010000002 ENOXAPARIN PER 10 MG: Performed by: STUDENT IN AN ORGANIZED HEALTH CARE EDUCATION/TRAINING PROGRAM

## 2022-08-10 RX ADMIN — ALBUTEROL SULFATE 2 PUFF: 90 AEROSOL, METERED RESPIRATORY (INHALATION) at 07:14

## 2022-08-10 RX ADMIN — BUDESONIDE AND FORMOTEROL FUMARATE DIHYDRATE 2 PUFF: 160; 4.5 AEROSOL RESPIRATORY (INHALATION) at 19:12

## 2022-08-10 RX ADMIN — ALBUTEROL SULFATE 2 PUFF: 90 AEROSOL, METERED RESPIRATORY (INHALATION) at 13:59

## 2022-08-10 RX ADMIN — NYSTATIN: 100000 POWDER TOPICAL at 09:02

## 2022-08-10 RX ADMIN — CETIRIZINE HYDROCHLORIDE 10 MG: 10 TABLET, FILM COATED ORAL at 09:03

## 2022-08-10 RX ADMIN — DOCUSATE SODIUM 50 MG AND SENNOSIDES 8.6 MG 2 TABLET: 8.6; 5 TABLET, FILM COATED ORAL at 09:03

## 2022-08-10 RX ADMIN — DOCUSATE SODIUM 50 MG AND SENNOSIDES 8.6 MG 2 TABLET: 8.6; 5 TABLET, FILM COATED ORAL at 21:10

## 2022-08-10 RX ADMIN — NICOTINE TRANSDERMAL SYSTEM 1 PATCH: 21 PATCH, EXTENDED RELEASE TRANSDERMAL at 09:05

## 2022-08-10 RX ADMIN — ASPIRIN 81 MG: 81 TABLET, CHEWABLE ORAL at 09:03

## 2022-08-10 RX ADMIN — ATORVASTATIN CALCIUM 80 MG: 40 TABLET, FILM COATED ORAL at 21:11

## 2022-08-10 RX ADMIN — CLOPIDOGREL 75 MG: 75 TABLET, FILM COATED ORAL at 09:03

## 2022-08-10 RX ADMIN — PREDNISONE 10 MG: 20 TABLET ORAL at 09:04

## 2022-08-10 RX ADMIN — PANTOPRAZOLE SODIUM 40 MG: 40 TABLET, DELAYED RELEASE ORAL at 09:03

## 2022-08-10 RX ADMIN — ALBUTEROL SULFATE 2 PUFF: 90 AEROSOL, METERED RESPIRATORY (INHALATION) at 19:12

## 2022-08-10 RX ADMIN — METOPROLOL TARTRATE 25 MG: 25 TABLET, FILM COATED ORAL at 09:03

## 2022-08-10 RX ADMIN — FLUTICASONE PROPIONATE 1 SPRAY: 50 SPRAY, METERED NASAL at 09:02

## 2022-08-10 RX ADMIN — NYSTATIN 1 APPLICATION: 100000 POWDER TOPICAL at 21:11

## 2022-08-10 RX ADMIN — ENOXAPARIN SODIUM 40 MG: 40 INJECTION SUBCUTANEOUS at 21:10

## 2022-08-10 RX ADMIN — GUAIFENESIN 1200 MG: 600 TABLET, EXTENDED RELEASE ORAL at 21:10

## 2022-08-10 RX ADMIN — BUDESONIDE AND FORMOTEROL FUMARATE DIHYDRATE 2 PUFF: 160; 4.5 AEROSOL RESPIRATORY (INHALATION) at 07:14

## 2022-08-10 RX ADMIN — GUAIFENESIN 1200 MG: 600 TABLET, EXTENDED RELEASE ORAL at 09:04

## 2022-08-10 NOTE — CASE MANAGEMENT/SOCIAL WORK
Discharge Planning Assessment  [facility]     Patient Name: Eimlio Guy  MRN: [id number]  Today's Date: 8/10/2022    Admit Date: 7/24/2022       Discharge Plan     Row Name 08/10/22 1659       Plan    Plan SS noted inpt rehab progress notes and Physician progress notes and attempted to discuss further dispositon planning with pt as well without success.  SS will follow up in am.                  MELIA David

## 2022-08-10 NOTE — NURSING NOTE
Rehab consult follow up:  [facility] has denied admission for Rehab.  A [id number] can be done within 5 days by calling [phone].  Ref# [id number].  Dr. Waldron, Sirena Colon, and Dr. Holguin notified via Epic chat.

## 2022-08-10 NOTE — PROGRESS NOTES
[facility] HOSPITALIST PROGRESS NOTE     Patient Identification:  Name:  Emilio Guy  Age:  56 y.o.  Sex:  male  :  1965  MRN:  [id number]  Visit Number:  [id number]  Primary Care Provider:  Valarie Garcia APRN    Length of stay:  16    Chief complaint: None    Subjective:    Patient continues to report feeling well today.  Still on 5 L nasal cannula.  Did discuss with patient his insurance denying inpatient rehab acceptance.  Patient wants to discuss skilled nursing facility placement with his family prior to making a decision.  Will await his decision, have informed case management as well.  ----------------------------------------------------------------------------------------------------------------------  Current Hospital Meds:  albuterol sulfate HFA, 2 puff, Inhalation, 4x Daily - RT  aspirin, 81 mg, Oral, Daily  atorvastatin, 80 mg, Oral, Nightly  budesonide-formoterol, 2 puff, Inhalation, BID - RT  cetirizine, 10 mg, Oral, Daily  clopidogrel, 75 mg, Oral, Daily  enoxaparin, 40 mg, Subcutaneous, Nightly  fluticasone, 1 spray, Nasal, Daily  guaiFENesin, 1,200 mg, Oral, BID  metoprolol tartrate, 25 mg, Oral, Q12H  nicotine, 1 patch, Transdermal, Q24H  nystatin, , Topical, Q12H  pantoprazole, 40 mg, Oral, QAM AC  predniSONE, 10 mg, Oral, Daily With Breakfast  senna-docusate sodium, 2 tablet, Oral, BID  sodium chloride, 10 mL, Intravenous, Q12H  sodium chloride, 10 mL, Intravenous, Q12H         ----------------------------------------------------------------------------------------------------------------------  Vital Signs:  Temp:  [97.9 °F (36.6 °C)-98.5 °F (36.9 °C)] 98.2 °F (36.8 °C)  Heart Rate:  [55-82] 82  Resp:  [18-20] 20  BP: (105-131)/(59-78) 112/69      22  0500 22  0500 08/10/22  0500   Weight: 76.4 kg (168 lb 8 oz) 77.2 kg (170 lb 3.2 oz) 76.7 kg (169 lb 1.6 oz)     Body mass index is 24.97 kg/m².    Intake/Output Summary (Last 24 hours) at 8/10/2022  1245  Last data filed at 8/10/2022 1000  Gross per 24 hour   Intake 1080 ml   Output 1000 ml   Net 80 ml     Diet Soft Texture; Chopped  ----------------------------------------------------------------------------------------------------------------------  Physical exam:  Constitutional: Chronically ill-appearing  male in no apparent distress.     HENT:  Head:  Normocephalic and atraumatic.  Mouth:  Moist mucous membranes.    Eyes:  Conjunctivae and EOM are normal.  Pupils are equal, round, and reactive to light.  No scleral icterus.    Neck:  Neck supple. No thyromegaly.  No JVD present.    Cardiovascular:  Regular rate and rhythm with no murmurs, rubs, clicks or gallops appreciated.  Pulmonary/Chest:  Clear to auscultation bilaterally with no crackles, wheezes or rhonchi appreciated.  Abdominal:  Soft. Nontender. Nondistended  Bowel sounds are normal in all four quadrants. No organomegally appreciated.   Musculoskeletal:  5/5 muscle strength bilateral upper and lower extremities with equal muscle tone and bulk. No edema, no tenderness, and no deformity.  No red or swollen joints anywhere.    Neurological:  Alert and oriented to person, place, and time. Cranial nerves II-XII intact with no focal deficits.  No facial droop.  No slurred speech.   Skin:  Warm and dry to palpation with no rashes or lesions appreciated.  Peripheral vascular:  2+ radial and pedal pulses in bilateral upper and lower extremities.  Psychiatric:  Alert and oriented x3, demonstrates appropriate judgment and insight.    Essentially no change in physical exam in comparison to 8/9/2022  -------------------------------------------------------------------------------  ----------------------------------------------------------------------------------------------------------------------  Results from last 7 days   Lab Units 08/05/22  0422   TROPONIN T ng/mL <0.010     Results from last 7 days   Lab Units 08/10/22  0427 08/09/22  0143  08/08/22  0448   WBC 10*3/mm3 16.84* 16.74* 15.98*   HEMOGLOBIN g/dL 11.9* 10.7* 11.7*   HEMATOCRIT % 37.5 33.9* 37.1*   MCV fL 96.9 97.7* 97.6*   MCHC g/dL 31.7 31.6 31.5   PLATELETS 10*3/mm3 188 199 204         Results from last 7 days   Lab Units 08/10/22  0427 08/09/22  0143 08/08/22  0448   SODIUM mmol/L 139 138 139   POTASSIUM mmol/L 3.6 3.6 4.3   CHLORIDE mmol/L 102 100 100   CO2 mmol/L 28.2 29.6* 30.9*   BUN mg/dL 11 13 13   CREATININE mg/dL 0.57* 0.58* 0.53*   CALCIUM mg/dL 9.1 8.8 8.7   GLUCOSE mg/dL 171* 141* 82   ALBUMIN g/dL 3.11* 3.21* 3.11*   BILIRUBIN mg/dL 0.2 <0.2 0.2   ALK PHOS U/L 49 52 47   AST (SGOT) U/L 22 21 21   ALT (SGPT) U/L 46* 42* 42*   Estimated Creatinine Clearance: 158.9 mL/min (A) (by C-G formula based on SCr of 0.57 mg/dL (L)).    No results found for: AMMONIA      No results found for: BLOODCX  No results found for: URINECX  No results found for: WOUNDCX  No results found for: STOOLCX    I have personally looked at the labs and they are summarized above.  ----------------------------------------------------------------------------------------------------------------------  Imaging Results (Last 24 Hours)     ** No results found for the last 24 hours. **        ----------------------------------------------------------------------------------------------------------------------  Assessment and Plan:    1.  Acute on chronic hypoxic/hypercapnic respiratory failure -now resolved, continue current 5 L nasal cannula per patient's home dose of oxygen.    2.  COVID-19 -patient now asymptomatic and back to baseline functioning status.  We will continue thromboembolism prophylaxis.    3.  COPD exacerbation -have decreased prednisone to 10 mg p.o. daily, day #2 of 3.  We will discontinue prednisone tomorrow. We will continue Symbicort and albuterol inhaler.    4.  Subacute MCA CVA -teleneurology recommends continuing aspirin, statin and Plavix.  Patient has declined MRI secondary to  claustrophobia    5.  Essential hypertension -well-controlled    6.  Hyperlipidemia -statin    7.  Tobacco abuse -encourage cessation    8.  General deconditioning -patient's insurance has declined inpatient rehab.  We will further discuss SNF placement    Disposition will likely require several more days hospitalization    Viral Waldron DO   08/10/22   12:45 EDT

## 2022-08-10 NOTE — THERAPY TREATMENT NOTE
Acute Care - Physical Therapy Treatment Note   [facility]     Patient Name: Emilio Guy  : 1965  MRN: [id number]  Today's Date: 8/10/2022   Onset of Illness/Injury or Date of Surgery: 22  Visit Dx:     ICD-10-CM ICD-9-CM   1. Acute respiratory failure with hypoxia and hypercapnia (HCC)  J96.01 518.81    J96.02    2. COVID-19  U07.1 079.89   3. Cerebrovascular accident (CVA), unspecified mechanism (HCC)  I63.9 434.91     Patient Active Problem List   Diagnosis   • COPD with acute exacerbation (HCC)   • Acute on chronic respiratory failure with hypoxia (HCC)   • Acute respiratory failure with hypoxia and hypercapnia (HCC)     Past Medical History:   Diagnosis Date   • CHF (congestive heart failure) (HCC)    • COPD (chronic obstructive pulmonary disease) (HCC)      No past surgical history on file.  PT Assessment (last 12 hours)     PT Evaluation and Treatment     Row Name 08/10/22 0815          Physical Therapy Time and Intention    Document Type therapy note (daily note)  -CS     Mode of Treatment physical therapy  -CS     Patient Effort good  -CS     Comment Pt seen bedside this AM.  -CS     Row Name 08/10/22 0815          General Information    Patient Profile Reviewed yes  -CS     Existing Precautions/Restrictions seizures  -CS     Row Name 08/10/22 0815          Living Environment    Primary Care Provided by self;parent(s)  Mother assist with meals, laundry  -CS     Row Name 08/10/22 0815          Home Use of Assistive/Adaptive Equipment    Equipment Currently Used at Home cane, quad;oxygen;nebulizer;other (see comments)  [employer] for Cane  -CS     Row Name 08/10/22 0815          Cognition    Orientation Status (Cognition) oriented x 4  -CS     Row Name 08/10/22 0815          Range of Motion Comprehensive    General Range of Motion no range of motion deficits identified  -CS     Row Name 08/10/22 0815          Strength Comprehensive (MMT)    General Manual Muscle Testing (MMT)  Assessment upper extremity strength deficits identified  -CS     Row Name 08/10/22 0815          Vision Assessment/Intervention    Visual Impairment/Limitations WFL  -CS     Row Name 08/10/22 0815          Bed Mobility    Bed Mobility bed mobility (all) activities  -CS     All Activities, [place] (Bed Mobility) minimum assist (75% patient effort);contact guard  -CS     Assistive Device (Bed Mobility) bed rails;draw sheet;head of bed elevated  -CS     Row Name 08/10/22 0815          Transfers    Transfers sit-stand transfer;stand-sit transfer  -CS     Sit-Stand [place] (Transfers) contact guard  -CS     Stand-Sit [place] (Transfers) contact guard  -CS     Row Name 08/10/22 0815          Sit-Stand Transfer    Assistive Device (Sit-Stand Transfers) other (see comments)  no AD  -CS     Row Name 08/10/22 0815          Stand-Sit Transfer    Assistive Device (Stand-Sit Transfers) other (see comments)  no AD  -CS     Row Name 08/10/22 0815          Gait/Stairs (Locomotion)    Gait/Stairs Locomotion gait/ambulation independence;gait/ambulation assistive device;distance ambulated  -CS     [place] Level (Gait) contact guard  -CS     Assistive Device (Gait) other (see comments)  no AD, close CGA/HHA  -CS     Distance in Feet (Gait) 10'  -CS     Row Name 08/10/22 0815          Safety Issues, Functional Mobility    Impairments Affecting Function (Mobility) balance;endurance/activity tolerance;shortness of breath  -CS     Row Name 08/10/22 0815          Motor Skills    Therapeutic Exercise --  Pt completed trunk flex x 10 sitting in bed, then completed LAQ x 10 each LE in sitting, bridging x 10 supine.  -CS     Row Name 08/10/22 0815          Respiratory WDL    Respiratory WDL X  -CS     Rhythm/Pattern, Respiratory unlabored;pattern regular;depth regular;no shortness of breath reported  -CS     Expansion/Accessory Muscles/Retractions no use of accessory muscles;no retractions;expansion symmetric  -CS     Cough  Frequency frequent  -CS     Cough Type productive  -CS     Row Name 08/10/22 0815          Breath Sounds    Breath Sounds All Fields  -CS     All Lung Fields Breath Sounds Anterior:;diminished  -CS     BORIS Breath Sounds Posterior:;clear  -CS     LLL Breath Sounds diminished  -CS     RUL Breath Sounds wheezes, inspiratory  -CS     RLL Breath Sounds diminished  -CS     Row Name 08/10/22 0815          Skin WDL    Skin WDL X  -CS     Skin Color/Characteristics redness blanchable  -CS     Skin Temperature warm  -CS     Skin Moisture dry  -CS     Skin Elasticity quick return to original state  -CS     Skin Integrity intact  -CS     Row Name 08/10/22 0815          Coping    Observed Emotional State calm;cooperative  -CS     Verbalized Emotional State acceptance  -CS     Family/Support Persons family  -CS     Involvement in Care not present at bedside  -CS     Row Name 08/10/22 0815          Positioning and Restraints    Pre-Treatment Position in bed  -CS     Post Treatment Position bed  -CS     In Bed call light within reach;encouraged to call for assist  -CS     Row Name 08/10/22 0815          Therapy Assessment/Plan (PT)    Rehab Potential (PT) fair, will monitor progress closely  -CS     Criteria for Skilled Interventions Met (PT) yes;meets criteria;skilled treatment is necessary  -CS     Therapy Frequency (PT) 3 times/wk  3-5x/week  -CS     Problem List (PT) problems related to;balance;mobility;strength  -CS     Activity Limitations Related to Problem List (PT) unable to ambulate safely;unable to transfer safely  -CS     Row Name 08/10/22 0815          Physical Therapy Goals    Bed Mobility Goal Selection (PT) bed mobility, PT goal 1  -CS     Transfer Goal Selection (PT) transfer, PT goal 1  -CS     Gait Training Goal Selection (PT) gait training, PT goal 1  -CS     Row Name 08/10/22 0815          Bed Mobility Goal 1 (PT)    Activity/Assistive Device (Bed Mobility Goal 1, PT) bed mobility activities, all  -CS      [place] Level/Cues Needed (Bed Mobility Goal 1, PT) standby assist  -CS     Time Frame (Bed Mobility Goal 1, PT) long term goal (LTG);by discharge  -CS     Row Name 08/10/22 0815          Transfer Goal 1 (PT)    Activity/Assistive Device (Transfer Goal 1, PT) transfers, all  -CS     [place] Level/Cues Needed (Transfer Goal 1, PT) standby assist  -CS     Time Frame (Transfer Goal 1, PT) long term goal (LTG);by discharge  -CS     Row Name 08/10/22 0815          Gait Training Goal 1 (PT)    Activity/Assistive Device (Gait Training Goal 1, PT) gait (walking locomotion);assistive device use  -CS     [place] Level (Gait Training Goal 1, PT) standby assist  -CS     Distance (Gait Training Goal 1, PT) 50'  -CS     Time Frame (Gait Training Goal 1, PT) long term goal (LTG);by discharge  -CS           User Key  (r) = Recorded By, (t) = Taken By, (c) = Cosigned By    Initials Name Provider Type    CS Kaiser Morley PT Physical Therapist                Physical Therapy Education                 Title: PT OT SLP Therapies (Done)     Topic: Physical Therapy (Done)     Point: Mobility training (Done)     Learning Progress Summary           Patient Acceptance, E, VU by  at 8/7/2022 0057      Show all documentation for this point (11)                 Point: Home exercise program (Done)     Learning Progress Summary           Patient Acceptance, E, VU by ML at 8/7/2022 0057      Show all documentation for this point (11)                 Point: Body mechanics (Done)     Learning Progress Summary           Patient Acceptance, E, VU by  at 8/7/2022 0057      Show all documentation for this point (11)                 Point: Precautions (Done)     Learning Progress Summary           Patient Acceptance, E, VU by  at 8/7/2022 0057      Show all documentation for this point (11)                             User Key     Initials Effective Dates Name Provider Type Discipline     05/10/22 -  Shwetha Lira RNA  Registered Nurse Nurse              PT Recommendation and Plan  Anticipated Discharge Disposition (PT): home with assist, inpatient rehabilitation facility  Planned Therapy Interventions (PT): balance training, bed mobility training, gait training, home exercise program, neuromuscular re-education, patient/family education, strengthening, transfer training  Therapy Frequency (PT): 3 times/wk (3-5x/week)  Progress Summary (PT)  Progress Toward Functional Goals (PT): progress toward functional goals is gradual  Daily Progress Summary (PT): Pt progressing slowly limited by endurance.  Plan of Care Reviewed With: patient  Progress: no change  Outcome Evaluation: Pt progressing slowly limited by endurance.       Time Calculation:    PT Charges     Row Name 08/10/22 1052             Time Calculation    PT Received On 08/10/22  -CS      PT Goal Re-Cert Due Date 08/11/22  -CS              Timed Charges    52213 - PT Therapeutic Exercise Minutes 15  -CS      12614 - Gait Training Minutes  15  -CS              Total Minutes    Timed Charges Total Minutes 30  -CS       Total Minutes 30  -CS            User Key  (r) = Recorded By, (t) = Taken By, (c) = Cosigned By    Initials Name Provider Type    CS Kaiser Morley, PT Physical Therapist              Therapy Charges for Today     Code Description Service Date Service Provider Modifiers Qty    35956787841 HC PT THER PROC EA 15 MIN 8/9/2022 Kaiser Morley, PT GP 1    72879034195 HC PT THERAPEUTIC ACT EA 15 MIN 8/9/2022 Kaiser Morley, PT GP 1    65961750427 HC PT THER PROC EA 15 MIN 8/10/2022 Kaiser Morley, PT GP 1    69074931057 HC GAIT TRAINING EA 15 MIN 8/10/2022 Kaiser Morley, PT GP 1               Kaiser Morley PT  8/10/2022

## 2022-08-10 NOTE — PLAN OF CARE
Goal Outcome Evaluation:  Pt resting in bed. Remains on 3 L/NC, sats above 90%. No IV access, dr aware. VSS. No s/s of acute distress noted. Will continue w/plan of care.

## 2022-08-10 NOTE — PLAN OF CARE
Goal Outcome Evaluation:      Patient resting in bed with signs, symptoms or complaints of pain or distress. Will continue plan of care.

## 2022-08-11 ENCOUNTER — READMISSION MANAGEMENT (OUTPATIENT)
Dept: CALL CENTER | Facility: HOSPITAL | Age: 57
End: 2022-08-11

## 2022-08-11 VITALS
HEIGHT: 69 IN | BODY MASS INDEX: 24.53 KG/M2 | RESPIRATION RATE: 18 BRPM | TEMPERATURE: 98.2 F | OXYGEN SATURATION: 98 % | HEART RATE: 62 BPM | WEIGHT: 165.6 LBS | DIASTOLIC BLOOD PRESSURE: 68 MMHG | SYSTOLIC BLOOD PRESSURE: 114 MMHG

## 2022-08-11 PROBLEM — J96.01 ACUTE RESPIRATORY FAILURE WITH HYPOXIA AND HYPERCAPNIA (HCC): Status: RESOLVED | Noted: 2022-07-25 | Resolved: 2022-08-11

## 2022-08-11 PROBLEM — J96.02 ACUTE RESPIRATORY FAILURE WITH HYPOXIA AND HYPERCAPNIA (HCC): Status: RESOLVED | Noted: 2022-07-25 | Resolved: 2022-08-11

## 2022-08-11 LAB
ALBUMIN SERPL-MCNC: 3.35 G/DL (ref 3.5–5.2)
ALBUMIN/GLOB SERPL: 1.6 G/DL
ALP SERPL-CCNC: 50 U/L (ref 39–117)
ALT SERPL W P-5'-P-CCNC: 46 U/L (ref 1–41)
ANION GAP SERPL CALCULATED.3IONS-SCNC: 10.4 MMOL/L (ref 5–15)
AST SERPL-CCNC: 21 U/L (ref 1–40)
BASOPHILS # BLD MANUAL: 0.15 10*3/MM3 (ref 0–0.2)
BASOPHILS NFR BLD MANUAL: 1 % (ref 0–1.5)
BILIRUB SERPL-MCNC: <0.2 MG/DL (ref 0–1.2)
BUN SERPL-MCNC: 13 MG/DL (ref 6–20)
BUN/CREAT SERPL: 21 (ref 7–25)
CALCIUM SPEC-SCNC: 9.2 MG/DL (ref 8.6–10.5)
CHLORIDE SERPL-SCNC: 102 MMOL/L (ref 98–107)
CO2 SERPL-SCNC: 28.6 MMOL/L (ref 22–29)
CREAT SERPL-MCNC: 0.62 MG/DL (ref 0.76–1.27)
DEPRECATED RDW RBC AUTO: 47.1 FL (ref 37–54)
EGFRCR SERPLBLD CKD-EPI 2021: 112.9 ML/MIN/1.73
EOSINOPHIL # BLD MANUAL: 0.3 10*3/MM3 (ref 0–0.4)
EOSINOPHIL NFR BLD MANUAL: 2 % (ref 0.3–6.2)
ERYTHROCYTE [DISTWIDTH] IN BLOOD BY AUTOMATED COUNT: 13.2 % (ref 12.3–15.4)
GLOBULIN UR ELPH-MCNC: 2.2 GM/DL
GLUCOSE SERPL-MCNC: 93 MG/DL (ref 65–99)
HCT VFR BLD AUTO: 38.2 % (ref 37.5–51)
HGB BLD-MCNC: 11.9 G/DL (ref 13–17.7)
HYPOCHROMIA BLD QL: ABNORMAL
LYMPHOCYTES # BLD MANUAL: 3.76 10*3/MM3 (ref 0.7–3.1)
LYMPHOCYTES NFR BLD MANUAL: 9 % (ref 5–12)
MACROCYTES BLD QL SMEAR: ABNORMAL
MCH RBC QN AUTO: 30.4 PG (ref 26.6–33)
MCHC RBC AUTO-ENTMCNC: 31.2 G/DL (ref 31.5–35.7)
MCV RBC AUTO: 97.4 FL (ref 79–97)
METAMYELOCYTES NFR BLD MANUAL: 2 % (ref 0–0)
MONOCYTES # BLD: 1.35 10*3/MM3 (ref 0.1–0.9)
NEUTROPHILS # BLD AUTO: 9.18 10*3/MM3 (ref 1.7–7)
NEUTROPHILS NFR BLD MANUAL: 59 % (ref 42.7–76)
NEUTS BAND NFR BLD MANUAL: 2 % (ref 0–5)
PLAT MORPH BLD: NORMAL
PLATELET # BLD AUTO: 196 10*3/MM3 (ref 140–450)
PMV BLD AUTO: 10.6 FL (ref 6–12)
POTASSIUM SERPL-SCNC: 4.3 MMOL/L (ref 3.5–5.2)
PROT SERPL-MCNC: 5.5 G/DL (ref 6–8.5)
RBC # BLD AUTO: 3.92 10*6/MM3 (ref 4.14–5.8)
SCAN SLIDE: NORMAL
SODIUM SERPL-SCNC: 141 MMOL/L (ref 136–145)
VARIANT LYMPHS NFR BLD MANUAL: 25 % (ref 19.6–45.3)
WBC NRBC COR # BLD: 15.05 10*3/MM3 (ref 3.4–10.8)

## 2022-08-11 PROCEDURE — 94660 CPAP INITIATION&MGMT: CPT

## 2022-08-11 PROCEDURE — 94761 N-INVAS EAR/PLS OXIMETRY MLT: CPT

## 2022-08-11 PROCEDURE — 94799 UNLISTED PULMONARY SVC/PX: CPT

## 2022-08-11 PROCEDURE — 80053 COMPREHEN METABOLIC PANEL: CPT | Performed by: STUDENT IN AN ORGANIZED HEALTH CARE EDUCATION/TRAINING PROGRAM

## 2022-08-11 PROCEDURE — 97535 SELF CARE MNGMENT TRAINING: CPT

## 2022-08-11 PROCEDURE — 85025 COMPLETE CBC W/AUTO DIFF WBC: CPT | Performed by: STUDENT IN AN ORGANIZED HEALTH CARE EDUCATION/TRAINING PROGRAM

## 2022-08-11 PROCEDURE — 63710000001 PREDNISONE PER 1 MG: Performed by: INTERNAL MEDICINE

## 2022-08-11 PROCEDURE — 99239 HOSP IP/OBS DSCHRG MGMT >30: CPT | Performed by: INTERNAL MEDICINE

## 2022-08-11 PROCEDURE — 85007 BL SMEAR W/DIFF WBC COUNT: CPT | Performed by: STUDENT IN AN ORGANIZED HEALTH CARE EDUCATION/TRAINING PROGRAM

## 2022-08-11 RX ORDER — ATORVASTATIN CALCIUM 80 MG/1
80 TABLET, FILM COATED ORAL NIGHTLY
Qty: 90 TABLET | Refills: 1 | Status: SHIPPED | OUTPATIENT
Start: 2022-08-11

## 2022-08-11 RX ORDER — CLOPIDOGREL BISULFATE 75 MG/1
75 TABLET ORAL DAILY
Qty: 30 TABLET | Refills: 1 | Status: SHIPPED | OUTPATIENT
Start: 2022-08-12

## 2022-08-11 RX ORDER — BUDESONIDE AND FORMOTEROL FUMARATE DIHYDRATE 160; 4.5 UG/1; UG/1
2 AEROSOL RESPIRATORY (INHALATION)
Qty: 10.2 G | Refills: 0 | Status: SHIPPED | OUTPATIENT
Start: 2022-08-11

## 2022-08-11 RX ADMIN — ASPIRIN 81 MG: 81 TABLET, CHEWABLE ORAL at 08:37

## 2022-08-11 RX ADMIN — NYSTATIN: 100000 POWDER TOPICAL at 08:38

## 2022-08-11 RX ADMIN — CETIRIZINE HYDROCHLORIDE 10 MG: 10 TABLET, FILM COATED ORAL at 08:37

## 2022-08-11 RX ADMIN — PANTOPRAZOLE SODIUM 40 MG: 40 TABLET, DELAYED RELEASE ORAL at 07:40

## 2022-08-11 RX ADMIN — FLUTICASONE PROPIONATE 1 SPRAY: 50 SPRAY, METERED NASAL at 08:39

## 2022-08-11 RX ADMIN — ALBUTEROL SULFATE 2 PUFF: 90 AEROSOL, METERED RESPIRATORY (INHALATION) at 07:25

## 2022-08-11 RX ADMIN — ALBUTEROL SULFATE 2 PUFF: 90 AEROSOL, METERED RESPIRATORY (INHALATION) at 00:52

## 2022-08-11 RX ADMIN — CLOPIDOGREL 75 MG: 75 TABLET, FILM COATED ORAL at 08:37

## 2022-08-11 RX ADMIN — METOPROLOL TARTRATE 25 MG: 25 TABLET, FILM COATED ORAL at 08:37

## 2022-08-11 RX ADMIN — ALBUTEROL SULFATE 2 PUFF: 90 AEROSOL, METERED RESPIRATORY (INHALATION) at 13:22

## 2022-08-11 RX ADMIN — GUAIFENESIN 1200 MG: 600 TABLET, EXTENDED RELEASE ORAL at 08:37

## 2022-08-11 RX ADMIN — NICOTINE TRANSDERMAL SYSTEM 1 PATCH: 21 PATCH, EXTENDED RELEASE TRANSDERMAL at 12:13

## 2022-08-11 RX ADMIN — BUDESONIDE AND FORMOTEROL FUMARATE DIHYDRATE 2 PUFF: 160; 4.5 AEROSOL RESPIRATORY (INHALATION) at 07:28

## 2022-08-11 RX ADMIN — PREDNISONE 10 MG: 20 TABLET ORAL at 08:37

## 2022-08-11 NOTE — PLAN OF CARE
Goal Outcome Evaluation:               Pt resting in bed this shift or up to BSC ad mendoza. AOx4, VSS, no complaints or s/s of acute distress noted. Plan for d/c home w/ home health. 3710 Report given to Jyoti Armando at [facility] @ [phone]. Family to provide transportation and home oxygen. IV access and telemetry monitoring removed w/ pt tolerating well.

## 2022-08-11 NOTE — DISCHARGE SUMMARY
[facility] HOSPITALIST MEDICINE DISCHARGE SUMMARY    Patient Identification:  Name:  Emilio Guy  Age:  56 y.o.  Sex:  male  :  1965  MRN:  [id number]  Visit Number:  [id number]    Date of Admission: 2022  Date of Discharge: 2022    PCP: Valarie Garcia, APRN    DISCHARGE DIAGNOSIS   1.  Acute on chronic hypoxic/hypercapnic respiratory failure  2.  COPD exacerbation  3.  COVID-19  4.  Subacute MCA CVA  5.  Essential hypertension  6.  Hyperlipidemia  7.  Tobacco abuse  8.  General deconditioning      CONSULTS  1. Dr. Espinosa, Pulmonology  2. Dr. Matamoros, Palliative Care  3. Dr. Joseph, Teleneurology      PROCEDURES PERFORMED   None      HOSPITAL COURSE  Mr. Guy is a 56 y.o. male who presented to [facility] ED on 2022 with a chief complaint of altered mental status.  Patient has a past medical history remarkable for COPD, tobacco abuse, alcohol abuse, COPD, essential hypertension and chronic diastolic CHF.  It should be noted patient was brought to the emergency department by family members as he did have altered mental status and all history was obtained from family or emergency room personnel.  Secondary to altered mental status, patient was brought to the emergency department for further treatment and evaluation.  Initial evaluation emergency department did consist of basic laboratory work as well as physical exam and vital signs.  Initial vital signs from patient's blood pressure 132/78, respirations 14, heart rate 104 and oxygen saturation 91% on 3 L nasal cannula.  Initial lab work did include ABG, CBC and CMP.  ABG demonstrated pH of 7.28, PCO2 84, PO2 51 on 3 L nasal cannula.  Patient was then placed on BiPAP with some improvement in ABG.  Repeat ABG demonstrated pH of 7.33, PCO2 72, PO2 66 with an oxygen saturation 93% with BiPAP settings of 24/8 and FiO2 40%.  BMP demonstrated hyponatremia of 126 but otherwise was within normal limits.  CBC was  within normal limits.  COVID-19 antigen was positive.  Chest x-ray demonstrated chronic opacity in right middle lobe.  CT of chest demonstrated emphysematous changes with poorly defined patchy opacities in the posterior right upper lobe and some consolidation in the right lower lobe likely reflecting mild pneumonia.  CT findings were not typical of COVID-19 pneumonia.  Secondary to altered mental status, teleneurology services were consulted.  After thorough evaluation, patient did have CT head without contrast demonstrating areas of low attenuation volume loss in the left cerebral hemisphere.  This was concerning for late subacute or possible chronic left cerebral hemisphere watershed infarcts.  Recommendation was made to obtain MRI and patient was admitted for further treatment and evaluation.    In regards to acute on chronic hypoxic/hypercapnic respiratory failure, patient was treated for COPD exacerbation with possible underlying pneumonia.  Patient was continued on antibiotic therapy and eventually was able to be taken off BiPAP therapy.  Patient does require 5 L nasal cannula at home and patient was able to return to 5 L nasal cannula oxygen supplementation prior to date of discharge.  It should be noted it is felt patient's hypoxic respiratory failure was secondary to COPD and not COVID-19.  Patient did test positive for COVID-19 but this is not felt to be contributing to patient's hospitalization.    In regards to altered mental status, this was likely multifactorial from acute on chronic hypoxic/hypercapnic respiratory failure in the setting of subacute MCA watershed distribution CVA.  Teleneurology services did evaluate the patient and recommended MRI.  Unfortunately, patient felt claustrophobic and would not agree to MRI.  As such, recommendation was made to continue aspirin 81 mg p.o. daily as well as Plavix 75 mg p.o. daily and high intensity statin with atorvastatin 80 mg p.o. nightly.  Patient was  seen by physical therapy during this hospitalization who did recommend inpatient rehab upon discharge as patient did have physical deconditioning.  Unfortunately, patient's insurance company did not agree with this assessment and denied patient acceptance to inpatient rehab.  Patient was offered placement in skilled nursing facility or returning home with home health physical therapy.  Patient has elected to return home with home health physical therapy.  With this in mind, it is felt patient has reached maximum medical benefit of current hospitalization and will be discharged home in stable condition today.  The beforementioned plan was thoroughly discussed with the patient and he expressed his understanding and willingness to proceed with the beforementioned plan.    VITAL SIGNS:      08/09/22  0500 08/10/22  0500 08/11/22  0523   Weight: 77.2 kg (170 lb 3.2 oz) 76.7 kg (169 lb 1.6 oz) 75.1 kg (165 lb 9.6 oz)     Body mass index is 24.45 kg/m².    PHYSICAL EXAM:  Constitutional: Chronically ill-appearing  male in no apparent distress.     HENT:  Head:  Normocephalic and atraumatic.  Mouth:  Moist mucous membranes.    Eyes:  Conjunctivae and EOM are normal.  Pupils are equal, round, and reactive to light.  No scleral icterus.    Neck:  Neck supple. No thyromegaly.  No JVD present.    Cardiovascular:  Regular rate and rhythm with no murmurs, rubs, clicks or gallops appreciated.  Pulmonary/Chest:  Clear to auscultation bilaterally with no crackles, wheezes or rhonchi appreciated.  Abdominal:  Soft. Nontender. Nondistended  Bowel sounds are normal in all four quadrants. No organomegally appreciated.   Musculoskeletal:  5/5 muscle strength bilateral upper and lower extremities with equal muscle tone and bulk. No edema, no tenderness, and no deformity.  No red or swollen joints anywhere.    Neurological:  Alert and oriented to person, place, and time. Cranial nerves II-XII intact with no focal deficits.  No  facial droop.  No slurred speech.   Skin:  Warm and dry to palpation with no rashes or lesions appreciated.  Peripheral vascular:  2+ radial and pedal pulses in bilateral upper and lower extremities.  Psychiatric:  Alert and oriented x3, demonstrates appropriate judgment and insight.    DISCHARGE DISPOSITION   Stable    DISCHARGE MEDICATIONS:     Discharge Medications      New Medications      Instructions Start Date   clopidogrel 75 MG tablet  Commonly known as: PLAVIX   75 mg, Oral, Daily   Start Date: August 12, 2022        Changes to Medications      Instructions Start Date   atorvastatin 80 MG tablet  Commonly known as: LIPITOR  What changed:   · medication strength  · how much to take   80 mg, Oral, Nightly         Continue These Medications      Instructions Start Date   albuterol 1.25 MG/3ML nebulizer solution  Commonly known as: ACCUNEB   1 ampule, Nebulization, 2 Times Daily PRN      Aspirin Low Dose 81 MG EC tablet  Generic drug: aspirin   81 mg, Oral, Daily      busPIRone 5 MG tablet  Commonly known as: BUSPAR   5 mg, Oral, 2 Times Daily PRN      fluticasone 50 MCG/ACT nasal spray  Commonly known as: FLONASE   1 spray, Nasal, Daily      guaiFENesin 600 MG 12 hr tablet  Commonly known as: MUCINEX   1,200 mg, Oral, 2 Times Daily      ipratropium 17 MCG/ACT inhaler  Commonly known as: ATROVENT HFA   2 puffs, Inhalation, Every 4 Hours PRN      ipratropium-albuterol 0.5-2.5 mg/3 ml nebulizer  Commonly known as: DUO-NEB   3 mL, Nebulization, Every 4 Hours PRN      loratadine 10 MG tablet  Commonly known as: CLARITIN   10 mg, Oral, Daily      metoprolol tartrate 25 MG tablet  Commonly known as: LOPRESSOR   25 mg, Oral, 2 Times Daily      ondansetron ODT 4 MG disintegrating tablet  Commonly known as: ZOFRAN-ODT   4 mg, Translingual, Every 6 Hours PRN      pantoprazole 40 MG EC tablet  Commonly known as: PROTONIX   40 mg, Oral, Daily      promethazine 12.5 MG tablet  Commonly known as: PHENERGAN   12.5 mg, Oral,  2 Times Daily PRN                   Additional Instructions for the Follow-ups that You Need to Schedule     Ambulatory Referral to Home Health   As directed      Face to Face Visit Date: 8/11/2022    Follow-up provider for Plan of Care?: I treated the patient in an acute care facility and will not continue treatment after discharge.    Follow-up provider: VALARIE PULIDO [[id number]]    Reason/Clinical Findings: general debility    Describe mobility limitations that make leaving home difficult: advanced copd, 5L oxygen dependent, hx of CVA    Nursing/Therapeutic Services Requested: Physical Therapy    PT orders: Strengthening    Frequency: 1 Week 1         Discharge Follow-up with PCP   As directed       Currently Documented PCP:    Valarie Pulido APRN    PCP Phone Number:    [phone]     Follow Up Details: please follow up with pcp in 2-3 weeks            Follow-up Information     Valarie Pulido APRN .    Specialty: Family Medicine  Why: please follow up with pcp in 2-3 weeks  Contact information:  [address]  [facility] [address]  [phone]                         TEST  RESULTS PENDING AT DISCHARGE       The ASCVD Risk score (Brownfield JORDY Jr., et al., 2013) failed to calculate for the following reasons:    The patient has a prior MI or stroke diagnosis     Viral Waldron DO  08/11/22  12:42 EDT    Please note that this discharge summary required more than 30 minutes to complete.    Please send a copy of this dictation to the following providers:  Valarie Pulido APRN

## 2022-08-11 NOTE — PLAN OF CARE
Goal Outcome Evaluation:  Pt resting in bed. Pt on 3 L/NC, sats above 90%. VSS. No s/s of acute distress noted. Plan of care discussed w/pt, no questions or concerns at this time.

## 2022-08-11 NOTE — DISCHARGE INSTR - APPOINTMENTS
Pt  has  an  apoimntment  with  cornelio torres  for   auguest 19  at  11 :15      respiratory  brought  a  pulse  oz  for  patient

## 2022-08-11 NOTE — PROGRESS NOTES
" LOS: 16 days     Chief Complaint:  Pulmonology is following for respiratory failure.       Subjective     Interval History:     Continues to tolerate 5 L/m NC oxygen support. No acute events/changes overnight. Transfer to inpatient rehab complicated by insurance approval.     Review of Systems:     Positive for SOB and fatigue - otherwise negative       Objective     Vital Signs  /68 (BP Location: Left arm, Patient Position: Sitting)   Pulse 77   Temp 98.2 °F (36.8 °C) (Oral)   Resp 18   Ht 175.3 cm (69\")   Wt 76.7 kg (169 lb 1.6 oz)   SpO2 96%   BMI 24.97 kg/m²      Physical Exam:                  Patient was seen and examined via telemedicine.     General- normal in appearance, not in any acute distress    HEENT- no scleral icterus    Neck-supple    Respiratory-respirations normal, on nasal cannula oxygen  Cardiovascular-  NSR    GI-nondistended     CNS-nonfocal    Musculoskeletal -no visible edema  Extremities- no obvious deformity noticed     Psychiatric-  Skin- no visible rash        Results Review:   I reviewed the patient's new clinical results.  I reviewed the patient's new imaging results and agree with the interpretation.        CBC      CBC 8/8/22 8/9/22 8/10/22   WBC 15.98 (A) 16.74 (A) 16.84 (A)   RBC 3.80 (A) 3.47 (A) 3.87 (A)   Hemoglobin 11.7 (A) 10.7 (A) 11.9 (A)   Hematocrit 37.1 (A) 33.9 (A) 37.5   MCV 97.6 (A) 97.7 (A) 96.9   MCH 30.8 30.8 30.7   MCHC 31.5 31.6 31.7   RDW 13.0 13.0 13.1   Platelets 204 199 188   (A) Abnormal value                  CMP      CMP 8/8/22 8/9/22 8/10/22   Glucose 82 141 (A) 171 (A)   BUN 13 13 11   Creatinine 0.53 (A) 0.58 (A) 0.57 (A)   Sodium 139 138 139   Potassium 4.3 3.6 3.6   Chloride 100 100 102   Calcium 8.7 8.8 9.1   Albumin 3.11 (A) 3.21 (A) 3.11 (A)   Total Bilirubin 0.2 <0.2 0.2   Alkaline Phosphatase 47 52 49   AST (SGOT) 21 21 22   ALT (SGPT) 42 (A) 42 (A) 46 (A)   (A) Abnormal value         Comments are available for some flowsheets but " are not being displayed.                 No results found for: SITE, ALLENTEST, PHART, JGQ8DRF, PO2ART, RIL4AYF, BASEEXCESS, C7OJYSZB, HGBBG, HCTABG, OXYHEMOGLOBI, METHHGBN, CARBOXYHGB, CO2CT, BAROMETRIC, MODALITY, FIO2        Medication Review:   Scheduled Medications:  albuterol sulfate HFA, 2 puff, Inhalation, 4x Daily - RT  aspirin, 81 mg, Oral, Daily  atorvastatin, 80 mg, Oral, Nightly  budesonide-formoterol, 2 puff, Inhalation, BID - RT  cetirizine, 10 mg, Oral, Daily  clopidogrel, 75 mg, Oral, Daily  enoxaparin, 40 mg, Subcutaneous, Nightly  fluticasone, 1 spray, Nasal, Daily  guaiFENesin, 1,200 mg, Oral, BID  metoprolol tartrate, 25 mg, Oral, Q12H  nicotine, 1 patch, Transdermal, Q24H  nystatin, , Topical, Q12H  pantoprazole, 40 mg, Oral, QAM AC  predniSONE, 10 mg, Oral, Daily With Breakfast  senna-docusate sodium, 2 tablet, Oral, BID  sodium chloride, 10 mL, Intravenous, Q12H  sodium chloride, 10 mL, Intravenous, Q12H      Continuous infusions:         Assessment:   Acute on chronic hypoxic and hypercapnic respiratory failure  COPD exacerbation  Community-acquired pneumonia  COVID-19 positivity   COPD  Chronic diastolic heart failure  Mediastinal adenopathy  Tobacco abuse     History of alcohol use  Encephalopathy  Subacute stroke/chronic appearing left cerebral infarction      Plan:   Remains on oxygen requirements of 5 L/m via nasal cannula, greatly improved form maximum needs during this hospitalization.   Titrate for goal SPO2 of 90% or greater.   Received home NIV device previously, noncompliant.   Completed steroid course.   Completed doxycyline, cefepime  Completed remdesivir  Continue mucinex  Can complete CT imaging in the outpatient setting for adneopathy follow up  On symbicort inhaler  On albuterol inhaler  Net negative fluid balance  On nicoderm patches  GI prophyalxis: pantoprazole  DVT prophylaxis: lovenox  Agree with PT/OT as tolerated.   Pending transfer to rehabilitation (inpatient service  denied by insurance, considering SNF).         Oly Islas PA-C  08/10/22  22:53 EDT  Case d/w nurse and team   This service is provided via audio video tele medicine   I am in [facility] and patient is in [facility]    I, Bennie Espinosa M.D. attest that the above note accurately reflects the work and decisions made  by me.  Patient was seen and evaluated by Dr. Espinosa, including history of present illness, physical exam, assessment, and treatment plan.  The above note was reviewed and edited by Dr. Espinosa.    Scribed for Dr. Espinosa by Oly Islas PA-C

## 2022-08-11 NOTE — CASE MANAGEMENT/SOCIAL WORK
Discharge Planning Assessment   Joaquim     Patient Name: Emilio Guy  MRN: [id number]  Today's Date: 8/11/2022    Admit Date: 7/24/2022       Discharge Plan     Row Name 08/11/22 1043       Plan    Plan SS spoke with pt on this date who stated that he would return home at discharge to address [address] in [facility] with Mother with home health rather than nursing home placement.  Pt stated no preference on home health provider.  SS will follow.                HERMES DavidW

## 2022-08-11 NOTE — OUTREACH NOTE
Prep Survey    Flowsheet Row Responses   [facility]od[facility] facility patient discharged from? [place]   Is LACE score < 7 ? No   Emergency Room discharge w/ pulse ox? No   Eligibility Readm Mgmt   Discharge diagnosis COVID-19  Subacute MCA CVA  COPD exacerbation   Does the patient have one of the following disease processes/diagnoses(primary or secondary)? COVID-19   Does the patient have Home health ordered? Yes   What is the Home health agency?   [facility]   Is there a DME ordered? No   Prep survey completed? Yes          LAZ FERNANDEZ - Registered Nurse

## 2022-08-11 NOTE — DISCHARGE PLACEMENT REQUEST
"Jack Guy (56 y.o. Male)             Date of Birth   1965    Social Security Number       Address   [address] [place] [id number]    Home Phone   [phone]    MRN   [id number]       Rastafarian   None    Marital Status   Single                            Admission Date   7/24/22    Admission Type   Emergency    Admitting Provider   Thomas Darden DO    Attending Provider   Viral Waldron DO    Department, Room/Bed   [address], 3312/1P       Discharge Date       Discharge Disposition   Home or Self Care    Discharge Destination                               Attending Provider: Viral Waldron DO    Allergies: Penicillins    Isolation: Enh Drop/Con   Infection: COVID (confirmed) (07/24/22)   Code Status: No CPR   Advance Care Planning Activity    Ht: 175.3 cm (69\")   Wt: 75.1 kg (165 lb 9.6 oz)    Admission Cmt: None   Principal Problem: None                Active Insurance as of 7/24/2022     Primary Coverage     Payor Plan Insurance Group Employer/Plan Group    [facility]      Payor Plan Address Payor Plan Phone Number Payor Plan Fax Number Effective Dates    [address]   6/1/2015 - None Entered    [facility] [phone]       Subscriber Name Subscriber Birth Date Member ID       JACK GUY 1965 [id number]                 Emergency Contacts      (Rel.) Home Phone Work Phone Mobile Phone    Barbie Guy (Mother) [phone] -- [phone]    CristianStephenie (Daughter) [phone] -- --            Emergency Contact Information     Name Relation Home Work Mobile    Barbie Guy Mother [phone]    CristianStephenie Daughter [phone]            Problem List           Codes Noted - Resolved       Non-Hospital    COPD with acute exacerbation (HCC) ICD-10-CM: J44.1  ICD-9-CM: 491.21 11/30/2021 - Present    Acute on chronic respiratory failure with hypoxia (HCC) ICD-10-CM: " J96.21  ICD-9-CM: 518.84, 799.02 2021 - Present             History & Physical      Sonia Allen APRN at 22 1115     Attestation signed by Thomas Darden DO at 22 1386    I have evaluated the patient independently, I have reviewed H&P, all labs and images from today and evaluated the patient at bedside.  I have discussed jayme/ ANGELA Ace and agree.  56-year-old male on 5 L nasal cannula at baseline, presented with AMS and COPD exacerbation.  I seen patient back in November and discharged home with trilogy, unclear if he has been compliant.  He is awake, alert, very poor aeration bilaterally while on BiPAP.  His mentation approximates baseline, can switch to baseline 5 L, can eat and drink then but failure he will require BiPAP for the remainder of the evening at night.  COVID-19 positive but CT notable for right lower lobe infiltrate, overall does not l look like COVID-19 disease-we will give remdesivir but will do high-dose Solu-Medrol as this is most consistent with COPD versus COVID.  Empiric antibiotics for CAP.  Subacute stroke noted, neurology following, will try MRI, if unable to obtain will get repeat head CT tomorrow                        [facility]  History & Physical    Patient Identification:  Name:  Emilio Guy  Age:  56 y.o.  Sex:  male  :  1965  MRN:  [id number]   Visit Number:  [id number]  Admit Date: 2022   Primary Care Physician:  Valarie Garcia APRN    Subjective     Chief complaint:   Chief Complaint   Patient presents with   • Altered Mental Status     History of presenting illness:      Emilio Guy is a 56 y.o. male with past medical history significant for HFpEF, COPD with ongoing tobacco use, alcohol use, essential hypertension presented to the emergency department at [facility] via EMS with an altered mental status.  Per report the patient's family that the patient was last at  "his baseline mental status around 10:30 AM on 7/24/2022.  He returned home later in the evening his mentation was altered.    At the time of my evaluation, the patient was sitting upright, legs crossed in bed wearing a BiPAP. Unkept and disheveled appearing. He is a poor historian but was able to answer most of my questions questions.  He said he was feeling poorly yesterday and he is unsure what happened.  He was unaware that he was COVID-19 positive.  States that he wears his oxygen at home.  When asked how much oxygen he was wearing at home he he states \"50 L\".  I asked him if he was wearing a BiPAP at home he said no.  He states he feels \"well\".  And that he wants to smoke a cigarette.  He also admits to drinking alcohol.  He was unable to tell me how much alcohol he drinks per day or when his last drink was.       Upon arrival to the ED, vital signs were pulse 104, respirations 14, blood pressure 132/78, oxygen saturation 91% on 3 L nasal cannula, afebrile.  Initial ABG with a pH of 7.282, PCO2 84.9, PO2 51.3,oxygen saturation 87.1.  That was on 3 L nasal cannula.  He was then placed on BiPAP with some improvement throughout the night.  Repeat ABG this morning with a pH of 7.33, PCO2 72.1, PO2 66.2 oxygen saturation 93%.  BiPAP setting 24/8 with FiO2 of 40.  She will CHEM profile sodium of 126, CO2 37.1, chloride 28.  CBC unimpressive.  COVID-19 swab positive.  His urine with ketones, protein, WBCs, trace bacteria.  Asked x-ray had findings of chronic opacity in the right middle lobe which could be chronic pneumonia, atelectasis, scarring.  Mild vascular congestion without pulmonary edema.  CT of the chest with emphysema and chronic changes of both lungs.  Poorly defined patchy opacities in the posterior right upper lobe and some consolidation in the right lower lobe which may reflect mild pneumonia.  Not typical findings for COVID-19 pneumonia.  Due to his altered mentation neurology was consulted.  The " patient was in the emergency department.  CT of the head without contrast showing areas of low attenuation volume loss to the left cerebral hemisphere.  These are consistent with late subacute or chronic left cerebral hemispheric border zone/watershed infarcts.  Follow-up MRI recommended for further characterization age of intracranial and salts.  Angiogram of the head and neck with patent vertebral arteries.  The patient will be admitted to critical care unit for close monitoring and continued therapy.    Known Emergency Department medications received prior to my evaluation included Tylenol suppository, aspirin suppository, Rocephin, dexamethasone, doxycycline, duo nebs, remdesivir, normal saline bolus.. Emergency Department Room location at the time of my evaluation was 103.     ---------------------------------------------------------------------------------------------------------------------   Review of Systems   Unable to perform ROS: Acuity of condition   Respiratory: Positive for shortness of breath.       ---------------------------------------------------------------------------------------------------------------------   Past Medical History:   Diagnosis Date   • CHF (congestive heart failure) ([facility])    • COPD (chronic obstructive pulmonary disease) ([facility])      No past surgical history on file.  Family History   Family history unknown: Yes     Social History     Socioeconomic History   • Marital status: Single   Tobacco Use   • Smoking status: Current Every Day Smoker     Packs/day: 1.50     Years: 25.00     Pack years: 37.50   • Smokeless tobacco: Never Used   Substance and Sexual Activity   • Alcohol use: Yes     Comment: drinks 1-2 beers everyday    • Drug use: No   • Sexual activity: Defer     ---------------------------------------------------------------------------------------------------------------------   Allergies:   Penicillins  ---------------------------------------------------------------------------------------------------------------------   Home medications:    Medications below are reported home medications pulling from within the system; at this time, these medications have not been reconciled unless otherwise specified and are in the verification process for further verifcation as current home medications.  (Not in a hospital admission)      Hospital Scheduled Meds:  [START ON 7/26/2022] remdesivir, 100 mg, Intravenous, Q24H           Current listed hospital scheduled medications may not yet reflect those currently placed in orders that are signed and held awaiting patient's arrival to floor.   ---------------------------------------------------------------------------------------------------------------------     Objective     Vital Signs:  Temp:  [97.9 °F (36.6 °C)-100.3 °F (37.9 °C)] 100.3 °F (37.9 °C)  Heart Rate:  [] 92  Resp:  [14-28] 28  BP: (103-156)/(63-95) 145/95      07/24/22 2140   Weight: 68 kg (150 lb)     Body mass index is 21.52 kg/m².  ---------------------------------------------------------------------------------------------------------------------       Physical Exam  Vitals and nursing note reviewed.   Constitutional:       General: He is awake.      Appearance: He is ill-appearing.      Interventions: Face mask in place.   HENT:      Head: Normocephalic and atraumatic.   Cardiovascular:      Rate and Rhythm: Normal rate and regular rhythm.      Heart sounds: Normal heart sounds, S1 normal and S2 normal.   Pulmonary:      Effort: Respiratory distress present.      Breath sounds: Decreased air movement present. Decreased breath sounds present.      Comments: bipap in place.   Abdominal:      General: Bowel sounds are normal.      Palpations: Abdomen is soft.   Musculoskeletal:      Cervical back: Normal range of motion and neck supple.   Feet:      Right foot:      Skin integrity: Dry skin  present.      Left foot:      Skin integrity: Dry skin present.   Skin:     General: Skin is warm.      Capillary Refill: Capillary refill takes 2 to 3 seconds.      Coloration: Skin is ashen.   Neurological:      Mental Status: He is alert. He is confused.      GCS: GCS eye subscore is 3. GCS verbal subscore is 4. GCS motor subscore is 6.      Motor: Weakness present.   Psychiatric:         Attention and Perception: Attention normal.         Cognition and Memory: Cognition is impaired. Memory is impaired.       ---------------------------------------------------------------------------------------------------------------------  EKG:        I have personally reviewed the above EKG. Official cardiology read pending.   ---------------------------------------------------------------------------------------------------------------------   Results from last 7 days   Lab Units 07/24/22  2219   LACTATE mmol/L 0.7   WBC 10*3/mm3 10.44   HEMOGLOBIN g/dL 16.1   HEMATOCRIT % 50.1   MCV fL 96.9   MCHC g/dL 32.1   PLATELETS 10*3/mm3 252     Results from last 7 days   Lab Units 07/25/22  0853 07/25/22  0529 07/25/22  0301 07/25/22  0103 07/24/22  2250   PH, ARTERIAL pH units 7.334* 7.299* 7.276* 7.231* 7.282*   PO2 ART mm Hg 66.2* 67.3* 60.4* 65.5* 51.3*   PCO2, ARTERIAL mm Hg 72.1* 77.9* 80.8* 90.6* 84.9*   HCO3 ART mmol/L 38.3* 38.2* 37.6* 38.0* 40.1*     Results from last 7 days   Lab Units 07/25/22  0843 07/24/22  6786   SODIUM mmol/L  --  126*   POTASSIUM mmol/L  --  5.2   CHLORIDE mmol/L  --  82*   CO2 mmol/L  --  37.1*   BUN mg/dL  --  22*   CREATININE mg/dL 0.63* 0.62*   CALCIUM mg/dL  --  9.8   GLUCOSE mg/dL  --  115*   ALBUMIN g/dL 4.13 4.58   BILIRUBIN mg/dL 0.2 0.3   ALK PHOS U/L 47 53   AST (SGOT) U/L 19 20   ALT (SGPT) U/L 18 19   Estimated Creatinine Clearance: 127.4 mL/min (A) (by C-G formula based on SCr of 0.63 mg/dL (L)).  Ammonia   Date Value Ref Range Status   07/24/2022 59 16 - 60 umol/L Final          Results from last 7 days   Lab Units 07/24/22  2219   PROBNP pg/mL 710.7     Lab Results   Component Value Date    HGBA1C 5.80 (H) 07/24/2022     Lab Results   Component Value Date    TSH 0.855 11/30/2021     No results found for: PREGTESTUR, PREGSERUM, HCG, HCGQUANT  Pain Management Panel     Pain Management Panel Latest Ref Rng & Units 7/24/2022 3/10/2022    AMPHETAMINES SCREEN, URINE Negative Negative Negative    BARBITURATES SCREEN Negative Negative Negative    BENZODIAZEPINE SCREEN, URINE Negative Negative Negative    BUPRENORPHINEUR Negative Negative Negative    COCAINE SCREEN, URINE Negative Negative Negative    METHADONE SCREEN, URINE Negative Negative Negative    METHAMPHETAMINEUR Negative Negative Negative        No results found for: BLOODCX  No results found for: URINECX  No results found for: WOUNDCX  No results found for: STOOLCX  Results from last 7 days   Lab Units 07/24/22 2219   CHOLESTEROL mg/dL 219*   TRIGLYCERIDES mg/dL 178*   HDL CHOL mg/dL 37*   LDL CHOL mg/dL 150*     ---------------------------------------------------------------------------------------------------------------------  Imaging Results (Last 7 Days)     Procedure Component Value Units Date/Time    CT Angiogram Neck [[id number]] Collected: 07/25/22 0031     Updated: 07/25/22 0033    Narrative:      INDICATION:   Altered mental status.    TECHNIQUE:   CT angiogram of the head and neck with IV contrast. Contrast dose recorded in the patient's chart. 3-D reformatted images were acquired and reviewed. Evaluation for a significant carotid arterial stenosis is based on the NASCET criteria. Radiation dose  reduction techniques included automated exposure control or exposure modulation based on body size. Radiation audit for number of CT and nuclear cardiology exams performed in the last year 2.    Post processing using RAPID imaging technique utilized. This is limited postprocessing performed in the setting of acute stroke  evaluation. Its not adequate to evaluate for intracranial aneurysm.    COMPARISON:   Earlier noncontrast head CT.    FINDINGS:   CTA neck:  There is atherosclerotic vascular calcification at the arch and great vessel origins without hemodynamically significant stenosis.    Right carotid system: There is partially calcified plaque at the right carotid bifurcation. There is about 10% diameter stenosis by NASCET criteria.    Left carotid system: There is some calcified plaque left common carotid artery and at the bifurcation. By NASCET criteria there is less than 10% diameter stenosis.    There is calcified plaque the origin of the left vertebral artery. Both vertebral arteries are patent and codominant. Probably mild stenosis at the left vertebral artery origin.    CTA head:  There is an anterior communicating artery present. There is no central intracranial vascular cut off. There is mild narrowing at the origin of the left A1 vessel. There is a small fenestration of the right side of the anterior communicating  artery. No significant sized posterior communicator is seen on either side. The dural venous sinuses are grossly patent. There are atherosclerotic vascular calcifications at both carotid bifurcations. This is grossly estimated to be mild to moderate on  the right and moderate to severe on the left. There is also some calcified plaque in the left petrous internal carotid artery. This left sided disease may be site of flow-limiting stenosis accounting for the left cerebral hemispheric watershed infarct.  If more information is needed for management purposes, patient may benefit from MR angiography. If the patient is a candidate for intervention, conventional angiography may be helpful.    There are emphysematous changes at the lung apices. The patient is edentulous. There are cervical spine degenerative changes.      Impression:        1. By NASCET criteria there is about 10% diameter stenosis of the right  carotid bifurcation and less than 10% diameter stenosis of the left carotid bifurcation.  2. Both vertebral arteries are patent and codominant with likely mild stenosis at the origin of the left vertebral artery. Both supply the basilar.  3. There is no central intracranial vascular cut off.  4. Calcified plaque involving the bilateral carotid siphons worse on the left than the right. Also calcified plaque involving the left petrous internal carotid artery. Disease in the left petrous, cavernous, and supraclinoid ICA may represent site of  flow-limiting stenosis resulting in previously diagnosed watershed infarct. Further imaging evaluation to be obtained depending upon clinical need for management purposes.    Signer Name: Niecy Khanna MD   Signed: 7/25/2022 12:31 AM   Workstation Name: [email or web address]    [facility]    CT Angiogram Head [[id number]] Collected: 07/25/22 0031     Updated: 07/25/22 0033    Narrative:      INDICATION:   Altered mental status.    TECHNIQUE:   CT angiogram of the head and neck with IV contrast. Contrast dose recorded in the patient's chart. 3-D reformatted images were acquired and reviewed. Evaluation for a significant carotid arterial stenosis is based on the NASCET criteria. Radiation dose  reduction techniques included automated exposure control or exposure modulation based on body size. Radiation audit for number of CT and nuclear cardiology exams performed in the last year 2.    Post processing using RAPID imaging technique utilized. This is limited postprocessing performed in the setting of acute stroke evaluation. Its not adequate to evaluate for intracranial aneurysm.    COMPARISON:   Earlier noncontrast head CT.    FINDINGS:   CTA neck:  There is atherosclerotic vascular calcification at the arch and great vessel origins without hemodynamically significant stenosis.    Right carotid system: There is partially calcified plaque at the right carotid bifurcation.  There is about 10% diameter stenosis by NASCET criteria.    Left carotid system: There is some calcified plaque left common carotid artery and at the bifurcation. By NASCET criteria there is less than 10% diameter stenosis.    There is calcified plaque the origin of the left vertebral artery. Both vertebral arteries are patent and codominant. Probably mild stenosis at the left vertebral artery origin.    CTA head:  There is an anterior communicating artery present. There is no central intracranial vascular cut off. There is mild narrowing at the origin of the left A1 vessel. There is a small fenestration of the right side of the anterior communicating  artery. No significant sized posterior communicator is seen on either side. The dural venous sinuses are grossly patent. There are atherosclerotic vascular calcifications at both carotid bifurcations. This is grossly estimated to be mild to moderate on  the right and moderate to severe on the left. There is also some calcified plaque in the left petrous internal carotid artery. This left sided disease may be site of flow-limiting stenosis accounting for the left cerebral hemispheric watershed infarct.  If more information is needed for management purposes, patient may benefit from MR angiography. If the patient is a candidate for intervention, conventional angiography may be helpful.    There are emphysematous changes at the lung apices. The patient is edentulous. There are cervical spine degenerative changes.      Impression:        1. By NASCET criteria there is about 10% diameter stenosis of the right carotid bifurcation and less than 10% diameter stenosis of the left carotid bifurcation.  2. Both vertebral arteries are patent and codominant with likely mild stenosis at the origin of the left vertebral artery. Both supply the basilar.  3. There is no central intracranial vascular cut off.  4. Calcified plaque involving the bilateral carotid siphons worse on the  left than the right. Also calcified plaque involving the left petrous internal carotid artery. Disease in the left petrous, cavernous, and supraclinoid ICA may represent site of  flow-limiting stenosis resulting in previously diagnosed watershed infarct. Further imaging evaluation to be obtained depending upon clinical need for management purposes.    Signer Name: Niecy Khanna MD   Signed: 7/25/2022 12:31 AM   [email or web address]    [facility]    CT Chest With Contrast Diagnostic [[id number]] Collected: 07/25/22 0026     Updated: 07/25/22 0028    Narrative:      CT Chest W    INDICATION:   Hypoxia. Mental status changes.    TECHNIQUE:   CT of the thorax with IV contrast. Coronal and sagittal reconstructions were obtained.  Radiation dose reduction techniques included automated exposure control or exposure modulation based on body size. Count of known CT and cardiac nuc med studies  performed in previous 12 months: 3.     COMPARISON:   3/10/2022    FINDINGS:  There is motion degradation, and there is streak artifact from the patient's arms. Thyroid is homogeneous. There is atherosclerotic disease and coronary artery disease. No aortic dissection is identified. There is an enlarged AP window lymph node  measuring 1.4 cm. This was previously normal. No other adenopathy is identified. No acute findings are seen in the upper abdomen allowing for motion.    Lung windows show emphysema. There is mild chronic scarring in the left lower lobe and lingula. There is also some scarring/atelectasis in the right middle lobe that has increased, and there is some scarring/atelectasis in the anterior right upper lobe.  There are some poorly defined patchy opacities in the posterior right upper lobe, and there is some consolidation in the right lower lobe. The latter findings may reflect a mild pneumonia. Imaging features are atypical or uncommonly reported for COVID-19  pneumonia. Alternative diagnoses  should be considered.      Impression:        1. Technically degraded exam as above.  2. Emphysema with chronic changes in both lungs.  3. Findings concerning for a mild pneumonia in the right lower lobe with some minimal patchy infiltrate in the right upper lobe as well.  4. AP window adenopathy, probably reactive. Consider 3 month follow-up chest CT.    Signer Name: Samy Samaniego MD   Signed: 7/25/2022 12:26 AM   Workstation Name: [email or web address]    [facility]    CT Head Without Contrast [[id number]] Collected: 07/24/22 2248     Updated: 07/24/22 2250    Narrative:      HISTORY:   Altered mental status today.    TECHNIQUE:   CT head without contrast. Radiation dose reduction techniques included automated exposure control or exposure modulation based on body size. Radiation audit for number of CT and nuclear cardiology exams performed in the last year 2    COMPARISON:   Head CT from 2017.    FINDINGS:   There is no displaced calvarial fracture. The mastoid air cells are clear. The visualized paranasal sinuses are clear. There is abnormal low-attenuation in the left anterolateral frontal lobe and left posterior lateral parietal lobe consistent with  previous watershed infarct. This is interval since the 2017 exam. There is volume loss and findings are most consistent with late subacute or chronic insult. There is small focus of chronic malacia at the left posterior lateral occipital lobe that is not  changed. Please correlate with interval stroke evaluation. If there is clinical concern for acute CVA, follow-up imaging is recommended, preferably with an MRI. There is chronic lacunar insult in the left inferior cerebellar hemisphere that is not  changed. There is no definite acute intracranial hemorrhage or extra-axial fluid collection. There is some motion limitation of the study. The basilar cisterns are patent. No intracranial mass effect. There are vascular calcifications at the base of  the  brain. 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       Impression:        1. Areas of low-attenuation and volume loss in the left cerebral hemisphere most consistent with a late subacute or chronic left cerebral hemispheric border zone/watershed infarct. Please correlate with stroke history and workup. No definite acute  hemorrhage mass effect or extra-axial fluid collection. Follow-up with a MRI would be most helpful for further characterization of the age of the intracranial insults. Correlation with MR angiography or CT angiography of the head and neck vessels with be  most helpful to evaluate the status of the vasculature.    Signer Name: Niecy Khanna MD   Signed: 7/24/2022 10:48 PM   Workstation Name: [email or web address]    [facility]    XR Chest 1 View [[id number]] Collected: 07/24/22 2240     Updated: 07/24/22 2242    Narrative:      CR Chest 1 Vw    INDICATION:   Mental status changes.     COMPARISON:    3/16/2022    FINDINGS:  Portable AP view(s) of the chest.  Cardiac and mediastinal contours are normal. There is some mild vascular congestion. The left lung is clear. There is persistent infiltrate or atelectasis or possibly chronic scarring in the right middle lobe that is  similar to prior. No pneumothorax.      Impression:      Chronic opacity in the right middle lobe could reflect chronic pneumonia, atelectasis, or even some scarring. Mild vascular congestion is noted without evidence of pulmonary edema.    Signer Name: Samy Samaniego MD   Signed: 7/24/2022 10:40 PM   Workstation Name: [email or web address]    [facility]          Cultures:  No results found for: BLOODCX, URINECX, WOUNDCX, MRSACX, RESPCX, STOOLCX    Last echocardiogram:  Results for orders placed during the hospital encounter of 11/30/21    Adult Transthoracic Echo Complete W/ Cont if Necessary Per Protocol    Interpretation  Summary  · Left ventricular wall thickness is consistent with borderline concentric hypertrophy.  · Left ventricular ejection fraction appears to be 61 - 65%. Left ventricular systolic function is normal.  · Left ventricular diastolic function is consistent with (grade I) impaired relaxation.                    I have personally reviewed the above radiology images and read the final radiology report on 07/25/22  ---------------------------------------------------------------------------------------------------------------------  Assessment / Plan     Active Hospital Problems    Diagnosis  POA   • Acute respiratory failure with hypoxia and hypercapnia ([facility]) [J96.01, J96.02]  Yes     -Acute respiratory failure with hypoxia and hypercapnia  -Acute COPD exacerbation  -Questionable right upper and lower lobe pneumonia, not consistent with COVID-19 pneumonia  -Covid 19 virus infection  -Emphysema  -Orthopnea  · Start remdesivir  · Dexamethasone in the emergency department will continue on admission with Solu-Medrol 60 mg every 12 hours for decreased air exchange in the lungs.  · Appropriate isolation  · Continue NPPV/NIV, titrate maintain oxygen saturation greater than 88%  · To microbials with IV Rocephin and IV doxycycline.  · Every 6 hours FSBG with steroid therapy.  · COPD rescue kit at discharge  · Will need follow-up with primary pulmonologist at discharge    -Altered mentation  -Acute left border zone ischemic infarction, perfusion/hypoxic in etiology  -Chronic left cerebellar infarction  · Neuro consult and evaluation.  · Obtain MRI of the brain for follow-up on CT of the head imaging.  · N.p.o. neurological checks  · SLP evaluation  · Daily ASA  · Continuous cardiac monitoring  · Fall precautions  ·  will likely need to follow-up with neurology at discharge    -HFpEF  · Last echocardiogram 11 of 2021 with an EF of 61 to 65%.  With grade 1 left ventricular diastolic dysfunction.  · Euvolemic on exam  · Update  transthoracic echocardiogram  · Monitor fluid status and for signs of decompensation  · Strict I's and O's    -Alcohol use  · Unknown how much alcohol patient drinks or his last drink  · Monitor closely for signs and symptoms of acute alcohol withdrawal    -Ongoing tobacco use  · NRT on admission  · Probably encourage smoking cessation      ----------  -DVT prophylaxis: Lovenox  -Activity: bedrest  -Expected length of stay: INPATIENT status due to the need for care which can only be reasonably provided in an hospital setting such as aggressive/expedited ancillary services and/or consultation services, the necessity for IV medications, close physician monitoring and/or the possible need for procedures.  In such, I feel patient’s risk for adverse outcomes and need for care warrant INPATIENT evaluation and predict the patient’s care encounter to likely last beyond 2 midnights.    -Disposition planning to return home    High risk secondary to COVID-19 infection, COPD exacerbation, pneumonia, HFpEF, alcohol use, tobacco use      ANGELA Ace   07/25/22  11:49 EDT      Electronically signed by Thomas Darden DO at 07/25/22 1455       Vital Signs (last day)     Date/Time Temp Temp src Pulse Resp BP Patient Position SpO2    08/11/22 1043 98.2 (36.8) Oral 62 18 114/68 Lying 98    08/11/22 0725 -- -- 70 21 -- -- 98    08/11/22 0703 98.5 (36.9) Oral 66 18 110/62 Lying 99    08/11/22 0101 98 (36.7) Oral 67 18 115/70 Lying 99    08/11/22 0052 -- -- -- 18 -- -- 94    08/10/22 1912 -- -- 77 18 -- -- 96    08/10/22 1847 98.2 (36.8) Oral 80 18 106/68 Sitting 97    08/10/22 1459 98.2 (36.8) Oral 64 18 101/66 Lying 99    08/10/22 1359 -- -- 77 19 -- -- 97    08/10/22 1025 98.2 (36.8) Oral 82 20 112/69 Lying 96    08/10/22 0714 -- -- 69 19 -- -- 98    08/10/22 0648 97.9 (36.6) Oral 63 18 118/78 Lying 99    08/10/22 0213 98.3 (36.8) Oral 55 18 131/76 Lying 98          Lines, Drains & Airways     Active LDAs     None                   Current Facility-Administered Medications   Medication Dose Route Frequency Provider Last Rate Last Admin   • acetaminophen (TYLENOL) tablet 650 mg  650 mg Oral Q4H PRN Thomas Darden DO   650 mg at 08/01/22 1856   • albuterol sulfate HFA (PROVENTIL HFA;VENTOLIN HFA;PROAIR HFA) inhaler 2 puff  2 puff Inhalation 4x Daily - RT Thomas Darden DO   2 puff at 08/11/22 1322   • aspirin chewable tablet 81 mg  81 mg Oral Daily Thomas Darden DO   81 mg at 08/11/22 0837   • atorvastatin (LIPITOR) tablet 80 mg  80 mg Oral Nightly Thomas Darden DO   80 mg at 08/10/22 2111   • sennosides-docusate (PERICOLACE) 8.6-50 MG per tablet 2 tablet  2 tablet Oral BID Thomas Darden DO   2 tablet at 08/10/22 2110    And   • polyethylene glycol (MIRALAX) packet 17 g  17 g Oral Daily PRN Thomas Darden DO   17 g at 08/03/22 1305    And   • bisacodyl (DULCOLAX) EC tablet 5 mg  5 mg Oral Daily PRN Thomas Darden DO        And   • bisacodyl (DULCOLAX) suppository 10 mg  10 mg Rectal Daily PRN Thomas Darden DO       • budesonide-formoterol (SYMBICORT) 160-4.5 MCG/ACT inhaler 2 puff  2 puff Inhalation BID - RT Thomas Darden DO   2 puff at 08/11/22 0728   • busPIRone (BUSPAR) tablet 5 mg  5 mg Oral BID PRN Thomas Darden DO   5 mg at 07/31/22 2015   • cetirizine (zyrTEC) tablet 10 mg  10 mg Oral Daily Thomas Darden DO   10 mg at 08/11/22 0837   • clopidogrel (PLAVIX) tablet 75 mg  75 mg Oral Daily Thomas Darden DO   75 mg at 08/11/22 0837   • Enoxaparin Sodium (LOVENOX) syringe 40 mg  40 mg Subcutaneous Nightly Thomas Darden DO   40 mg at 08/10/22 2110   • fluticasone (FLONASE) 50 MCG/ACT nasal spray 1 spray  1 spray Nasal Daily Thomas Darden DO   1 spray at 08/11/22 0839   • guaiFENesin (MUCINEX) 12 hr tablet 1,200 mg  1,200 mg Oral BID Thomas Darden DO    1,200 mg at 08/11/22 0837   • metoprolol tartrate (LOPRESSOR) tablet 25 mg  25 mg Oral Q12H Thomas Darden DO   25 mg at 08/11/22 0837   • nicotine (NICODERM CQ) 21 MG/24HR patch 1 patch  1 patch Transdermal Q24H Thomas Darden DO   1 patch at 08/11/22 1213   • nitroglycerin (NITROSTAT) SL tablet 0.4 mg  0.4 mg Sublingual Q5 Min PRN Thomas Darden DO       • nystatin (MYCOSTATIN) powder   Topical Q12H Thomas Darden DO   Given at 08/11/22 0838   • ondansetron (ZOFRAN) injection 4 mg  4 mg Intravenous Q6H PRN Thomas Darden DO       • pantoprazole (PROTONIX) EC tablet 40 mg  40 mg Oral QAM Jose M Johnson MD   40 mg at 08/11/22 0740   • predniSONE (DELTASONE) tablet 10 mg  10 mg Oral Daily With Breakfast Viral Waldron DO   10 mg at 08/11/22 0837   • sodium chloride 0.9 % flush 10 mL  10 mL Intravenous PRN Thomas Darden DO       • sodium chloride 0.9 % flush 10 mL  10 mL Intravenous PRN Thomas Darden DO       • sodium chloride 0.9 % flush 10 mL  10 mL Intravenous Q12H Thomas Darden DO   10 mL at 08/09/22 0842   • sodium chloride 0.9 % flush 10 mL  10 mL Intravenous PRN Thomas Darden DO   10 mL at 08/01/22 1247   • sodium chloride 0.9 % flush 10 mL  10 mL Intravenous Q12H Thomas Darden DO   10 mL at 08/09/22 0847   • sodium chloride 0.9 % flush 10 mL  10 mL Intravenous PRN Thomas Darden DO         Orders (last 24 hrs)      Start     Ordered    08/12/22 0000  clopidogrel (PLAVIX) 75 MG tablet  Daily         08/11/22 1228    08/11/22 1226  Discontinue IV  Once         08/11/22 1228    08/11/22 1219  Discharge patient  Once         08/11/22 1228    08/11/22 0741  Manual Differential  Once         08/11/22 0740    08/11/22 0725  Scan Slide  Once         08/11/22 0724    08/11/22 0600  CBC (No Diff)  Morning Draw,   Status:  Canceled         08/10/22 1247    08/11/22 0600  Basic  "Metabolic Panel  Morning Draw,   Status:  Canceled         08/10/22 1247    08/11/22 0600  CBC Auto Differential  PROCEDURE ONCE         08/10/22 2202    08/11/22 0000  atorvastatin (LIPITOR) 80 MG tablet  Nightly         08/11/22 1228    08/11/22 0000  Discharge Follow-up with PCP         08/11/22 1228    08/11/22 0000  Ambulatory Referral to Home Health         08/11/22 1228    08/11/22 0000  budesonide-formoterol (SYMBICORT) 160-4.5 MCG/ACT inhaler  2 Times Daily - RT         08/11/22 1305    08/10/22 0800  predniSONE (DELTASONE) tablet 10 mg  Daily With Breakfast         08/09/22 1227    08/02/22 0730  pantoprazole (PROTONIX) EC tablet 40 mg  Every Morning Before Breakfast         08/01/22 1054    07/29/22 2100  nystatin (MYCOSTATIN) powder  Every 12 Hours Scheduled         07/29/22 1438    07/29/22 1200  Dietary Nutrition Supplements Boost Plus (Ensure Enlive, Ensure Plus)  Daily With Breakfast, Lunch & Dinner       07/29/22 1055    07/26/22 2130  budesonide-formoterol (SYMBICORT) 160-4.5 MCG/ACT inhaler 2 puff  2 Times Daily - RT         07/26/22 1612    07/26/22 1100  clopidogrel (PLAVIX) tablet 75 mg  Daily         07/26/22 0934    07/26/22 0900  aspirin chewable tablet 81 mg  Daily        \"Or\" Linked Group Details    07/25/22 1755    07/26/22 0900  aspirin suppository 300 mg  Daily,   Status:  Discontinued        \"Or\" Linked Group Details    07/25/22 1755    07/26/22 0600  CBC & Differential  Daily       07/25/22 1755    07/26/22 0600  Comprehensive Metabolic Panel  Daily       07/25/22 1755    07/25/22 2100  sodium chloride 0.9 % flush 10 mL  Every 12 Hours Scheduled         07/25/22 1755    07/25/22 2100  Enoxaparin Sodium (LOVENOX) syringe 40 mg  Nightly         07/25/22 1755    07/25/22 2100  sennosides-docusate (PERICOLACE) 8.6-50 MG per tablet 2 tablet  2 Times Daily        \"And\" Linked Group Details    07/25/22 1755 07/25/22 2100  atorvastatin (LIPITOR) tablet 80 mg  Nightly         07/25/22 1755 " "   07/25/22 2100  guaiFENesin (MUCINEX) 12 hr tablet 1,200 mg  2 Times Daily         07/25/22 1755 07/25/22 2100  metoprolol tartrate (LOPRESSOR) tablet 25 mg  Every 12 Hours Scheduled         07/25/22 1755 07/25/22 2100  sodium chloride 0.9 % flush 10 mL  Every 12 Hours Scheduled         07/25/22 1854 07/25/22 2030  albuterol sulfate HFA (PROVENTIL HFA;VENTOLIN HFA;PROAIR HFA) inhaler 2 puff  4 Times Daily - RT         07/25/22 1755 07/25/22 2000  Neuro Checks  Every 4 Hours       07/25/22 1755 07/25/22 2000  Intake and Output  Every 4 Hours       07/25/22 1755 07/25/22 2000  fluticasone (FLONASE) 50 MCG/ACT nasal spray 1 spray  Daily         07/25/22 1755 07/25/22 2000  cetirizine (zyrTEC) tablet 10 mg  Daily         07/25/22 1755 07/25/22 1854  sodium chloride 0.9 % flush 10 mL  As Needed         07/25/22 1854 07/25/22 1800  Oral Care  2 Times Daily       07/25/22 1755 07/25/22 1756  Intake & Output  Every Shift       07/25/22 1755 07/25/22 1756  NIHSS Assessment  Every Shift      Comments: Turn off all sedation medications prior to performing assessment. Assessment to be performed upon admission, transfer to another unit, discharge, and with neurological decline. If NIHSS change is greater than or equal to 4 and/or neurological decline is noted notify physician.    07/25/22 1755 07/25/22 1755  polyethylene glycol (MIRALAX) packet 17 g  Daily PRN        \"And\" Linked Group Details    07/25/22 1755 07/25/22 1755  bisacodyl (DULCOLAX) EC tablet 5 mg  Daily PRN        \"And\" Linked Group Details    07/25/22 1755 07/25/22 1755  bisacodyl (DULCOLAX) suppository 10 mg  Daily PRN        \"And\" Linked Group Details    07/25/22 1755 07/25/22 1755  busPIRone (BUSPAR) tablet 5 mg  2 Times Daily PRN         07/25/22 1755 07/25/22 1755  ondansetron (ZOFRAN) injection 4 mg  Every 6 Hours PRN         07/25/22 1755    07/25/22 1755  acetaminophen (TYLENOL) tablet 650 mg  Every 4 Hours " "PRN         07/25/22 1755    07/25/22 1755  sodium chloride 0.9 % flush 10 mL  As Needed         07/25/22 1755    07/25/22 1755  nitroglycerin (NITROSTAT) SL tablet 0.4 mg  Every 5 Minutes PRN         07/25/22 1755    07/25/22 1212  nicotine (NICODERM CQ) 21 MG/24HR patch 1 patch  Every 24 Hours         07/25/22 1210    07/24/22 2206  sodium chloride 0.9 % flush 10 mL  As Needed        \"And\" Linked Group Details    07/24/22 2207 07/24/22 2146  sodium chloride 0.9 % flush 10 mL  As Needed         07/24/22 2147    Unscheduled  ECG 12 Lead  As Needed      Comments: Nurse to Release if Patient Expericences Acute Chest Pain or Dysrhythmias    07/25/22 1755    Unscheduled  Potassium  As Needed      Comments: For Ventricular Arrhythmias      07/25/22 1755    Unscheduled  Magnesium  As Needed      Comments: For Ventricular Arrhythmias      07/25/22 1755    Unscheduled  Troponin  As Needed      Comments: For Chest Pain      07/25/22 1755    Unscheduled  Blood Gas, Arterial -With Co-Ox Panel: Yes  As Needed      Comments: Draw for Acute Dyspnea, Cyanosis, Suspected Hypoxemia or UnresponsivenessNotify Provider of Results      07/25/22 1755    Unscheduled  ACLS Protocol For Life Threatening Dysrhythmias (Unless Code Status Indicates Otherwise)  As Needed       07/25/22 1755    Unscheduled  Order CT Head Without Contrast for Neurological Decline  As Needed       07/25/22 1755    Unscheduled  Change Dressing to IV Site As Needed When Damp, Loose or Soiled  As Needed       07/25/22 1854    Unscheduled  Insert New Peripheral IV  As Needed      Comments: Frequency Per Facility Policy    07/25/22 1854    Unscheduled  Bladder Scan if Patient Unable to Void 4-6 Hours After Catheter Removal  As Needed         07/29/22 1029    Unscheduled  If Bladder Scan Volume is Less Than 500mL & Patient is Without Symptoms of Bladder Discomfort / Distention Monitor Every 1-2 Hours for Spontaneous Void  As Needed       07/29/22 1029    Unscheduled  " Straight Cath Every 4-6 Hours As Needed If Patient is Unable to Void After 4-6 Hours, Bladder Scan Volume is Greater Than 500mL & Patient Has Symptoms of Bladder Discomfort / Distention  As Needed       07/29/22 1029    Unscheduled  Schedule / Prompt Voiding For Patients With Urinary Incontinence  As Needed       07/29/22 1029    --  busPIRone (BUSPAR) 5 MG tablet  2 Times Daily PRN         07/25/22 1230    --  albuterol (ACCUNEB) 1.25 MG/3ML nebulizer solution  2 Times Daily PRN         07/25/22 1232    --  guaiFENesin (MUCINEX) 600 MG 12 hr tablet  2 Times Daily         07/25/22 1232    --  loratadine (CLARITIN) 10 MG tablet  Daily         07/25/22 1232    --  promethazine (PHENERGAN) 12.5 MG tablet  2 Times Daily PRN         07/25/22 1232    --  ondansetron ODT (ZOFRAN-ODT) 4 MG disintegrating tablet  Every 6 Hours PRN         07/25/22 1232    --  SCANNED - TELEMETRY           07/24/22 0000    --  SCANNED - TELEMETRY           07/24/22 0000    --  SCANNED - TELEMETRY           07/24/22 0000    --  SCANNED - TELEMETRY           07/24/22 0000    --  SCANNED - TELEMETRY           07/24/22 0000    --  SCANNED - TELEMETRY           07/24/22 0000    --  SCANNED - TELEMETRY           07/24/22 0000    --  SCANNED - TELEMETRY           07/24/22 0000    --  SCANNED - TELEMETRY           07/24/22 0000    --  SCANNED - TELEMETRY           07/24/22 0000    --  SCANNED - TELEMETRY           07/24/22 0000    --  SCANNED - TELEMETRY           07/24/22 0000    --  SCANNED - TELEMETRY           07/24/22 0000    --  SCANNED - TELEMETRY           07/24/22 0000    --  SCANNED - TELEMETRY           07/24/22 0000    --  SCANNED - TELEMETRY           07/24/22 0000    --  SCANNED - TELEMETRY           07/24/22 0000    --  SCANNED - TELEMETRY           07/24/22 0000    --  SCANNED - TELEMETRY           07/24/22 0000    --  SCANNED - TELEMETRY           07/24/22 0000    --  SCANNED - TELEMETRY           07/24/22 0000    --  SCANNED -  TELEMETRY           07/24/22 0000    --  SCANNED - TELEMETRY           07/24/22 0000    --  SCANNED - TELEMETRY           07/24/22 0000    --  SCANNED - TELEMETRY           07/24/22 0000    --  SCANNED - TELEMETRY           07/24/22 0000    --  SCANNED - TELEMETRY           07/24/22 0000    --  SCANNED - TELEMETRY           07/24/22 0000    --  SCANNED - TELEMETRY           07/24/22 0000    --  SCANNED - TELEMETRY           07/24/22 0000    --  SCANNED - TELEMETRY           07/24/22 0000    --  SCANNED - TELEMETRY           07/24/22 0000    --  SCANNED - TELEMETRY           07/24/22 0000    --  SCANNED - TELEMETRY           07/24/22 0000    --  SCANNED - TELEMETRY           07/24/22 0000    --  SCANNED - TELEMETRY           07/24/22 0000    --  SCANNED - TELEMETRY           07/24/22 0000    --  SCANNED - TELEMETRY           07/24/22 0000    --  SCANNED - TELEMETRY           07/24/22 0000    --  SCANNED - TELEMETRY           07/24/22 0000    --  SCANNED - TELEMETRY           07/24/22 0000    --  SCANNED - TELEMETRY           07/24/22 0000    --  SCANNED - TELEMETRY           07/24/22 0000    --  SCANNED - TELEMETRY           07/24/22 0000    --  SCANNED - TELEMETRY           07/24/22 0000    --  SCANNED - TELEMETRY           07/24/22 0000    --  SCANNED - TELEMETRY           07/24/22 0000    --  SCANNED - TELEMETRY           07/24/22 0000    --  SCANNED - TELEMETRY           07/24/22 0000    --  SCANNED - TELEMETRY           07/24/22 0000    --  SCANNED - TELEMETRY           07/24/22 0000    --  SCANNED - TELEMETRY           07/24/22 0000    --  SCANNED - TELEMETRY           07/24/22 0000    --  SCANNED - TELEMETRY           07/24/22 0000    --  SCANNED - TELEMETRY           07/24/22 0000    --  SCANNED - TELEMETRY           07/24/22 0000                ADL Documentation (last day)     Date/Time Transferring Toileting Bathing Dressing Eating Communication Swallowing Equipment Currently Used at Home    08/11/22 0900 0  "- independent 0 - independent 0 - independent 0 - independent 0 - independent 0 - understands/communicates without difficulty 0 - swallows foods/liquids without difficulty --    08/11/22 0830 -- -- -- -- -- -- -- cane, quad;oxygen;nebulizer;other (see comments)     Equipment Currently Used at Home: [employer] at 08/11/22 0830    08/10/22 2111 0 - independent 0 - independent 0 - independent 0 - independent 0 - independent 0 - understands/communicates without difficulty 0 - swallows foods/liquids without difficulty --    08/10/22 0815 -- -- -- -- -- -- -- cane, quad;oxygen;nebulizer;other (see comments)     Equipment Currently Used at Home: [employer] at 08/10/22 0815            After Visit Summary    Emilio Guy  MRN: [id number]    Icon Date   7/25/2022 - 8/11/2022   Icon Location   [address]     Icon Checklist header      Your Next Steps      Icon do   Do     Icon Checkbox     these medications from [facility] [place]  1 atorvastatin  2 budesonide-formoterol  3 clopidogrel      Icon read   Read     Icon Checkbox    Read these attachments  1 Acute Respiratory Failure  Adult (English)  2 Symptoms of COVID-19 - CDC (02/22/2021) (English)  3 Clopidogrel Tablets (English)      [employer] Sign-Up    Send messages to your doctor, view your test results, renew your prescriptions, schedule appointments, and more.     Go to [email or web address], click \"Sign Up Now\", and enter your personal activation code: [email or web address]. Activation code expires 9/10/2022.      After Visit Summary    Instructions     Icon medication changes this visit             Your medications have changed    Icon medications to start taking   START taking:   budesonide-formoterol (SYMBICORT)    clopidogrel (PLAVIX)   Start taking on: August 12, 2022    Icon medications to change how you take   CHANGE how you take:   atorvastatin (LIPITOR) -- " medication strength, how much to take    Review your updated medication list below.    Your Next Steps  Icon do   Do  Icon read   Read    COVID-19 Vaccination Information  Why Get Vaccinated?  Building defenses against COVID-19 is a team effort, and you are a owens part of that team. Getting the COVID-19 vaccine adds one more layer of protection for you, your coworkers, and family. Here are ways you can build people’s confidence in the COVID-19 vaccines in your community and at home.     · Get vaccinated and enroll in the v-safe text messaging program to help CDC monitor vaccine safety.   · Tell others why you are getting vaccinated and encourage them to get vaccinated. Share your success story.    · Learn how to have conversations about COVID-19 vaccine with coworkers, family, and friends.  · https://www.cdc.gov/coronavirus/2019-ncov/vaccines/index.html     How do I schedule an appointment for a vaccine?  https://www.vaccines.gov/ helps you find locations that carry COVID-19 vaccines and their contact information. Because every location handles appointments differently, you will need to schedule your appointment directly with the location you choose.          If you have any questions about your recovery, please call the [facility] Nurse Call Center at [phone]. A registered nurse is available 24 hours a day 7 days a week to assist you.   If you have any COVID-19 related questions, please call [phone].                 Icon activity      Activity instructions      Resume activity as tolerated         Icon diet      Diet instructions      Resume diet as tolerated           Icon Orders placed today                    Other instructions    Discharge Follow-up with PCP   Currently Documented PCP:   Valarie Garcia APRN   PCP Phone Number:   [phone]           What's Next    What's Next          Follow up with ANGELA Macdonald  please follow up with pcp in 2-3 weeks [address]    [facility] [address]  [phone]          Follow up with ANGELA Macdonald in 1 week(s)  pt  has  an  apointment  with  benjamin torres  for auguest 19   at   11:15  and  respoiratory  has  delivered  a  pulse  oz [address]   [facility] [address]  [phone]          Ambulatory Referral to Home Health     Home Health Services              Your Allergies  Date Reviewed: 2022  Your Allergies   Allergen Reactions   Penicillins Hives         Patient Belongings Returned      Document Return of Belongings Flowsheet    Were the patient bedside belongings sent home? Yes   Medications Retrieved from Pharmacy & Sent Home N/A   Belongings Sent to Safe None   Belongings sent with: --   Belongings Retrieved from Security & Sent Home N/A           Shuame Signup    [facility] Shuame allows you to send messages to your doctor, view your test results, renew your prescriptions, schedule appointments, and more. To sign up, go to [email or web address] and click on the Sign Up Now link in the New User? box. Enter your Shuame Activation Code exactly as it appears below along with the last four digits of your Social Security Number and your Date of Birth () to complete the sign-up process. If you do not sign up before the expiration date, you must request a new code.     Shuame Activation Code: [email or web address]  Expires: 9/10/2022  1:29 PM     If you have questions, you can email [email or web address] or call [phone] to talk to our Shuame staff. Remember, Shuame is NOT to be used for urgent needs. For medical emergencies, dial 911.       Medication List    Medication List     Morning Afternoon Evening Bedtime As Needed    albuterol 1.25 MG/3ML nebulizer solution  Commonly known as: ACCUNEB  Take 1 ampule by nebulization 2 (Two) Times a Day As Needed for Wheezing or Shortness of Air.  Last time this was given: Ask your nurse or doctor         Aspirin Low Dose 81 MG EC tablet  Take 1  tablet by mouth Daily.  Generic drug: aspirin  Last time this was given: Ask your nurse or doctor        Icon medications to change how you take   atorvastatin 80 MG tablet  Commonly known as: LIPITOR  Take 1 tablet by mouth Every Night.  What changed:   0 medication strength  0 how much to take  Last time this was given: 80 mg on August 10, 2022  9:11 PM        Icon medications to start taking   budesonide-formoterol 160-4.5 MCG/ACT inhaler  Commonly known as: SYMBICORT  Inhale 2 puffs 2 (Two) Times a Day. Indications: Chronic Obstructive Lung Disease  For: Chronic Obstructive Lung Disease  Last time this was given: 2 puffs on August 11, 2022  7:28 AM         busPIRone 5 MG tablet  Commonly known as: BUSPAR  Take 5 mg by mouth 2 (Two) Times a Day As Needed.        Icon medications to start taking   clopidogrel 75 MG tablet  Commonly known as: PLAVIX  Start taking on: August 12, 2022  Take 1 tablet by mouth Daily.  Last time this was given: 75 mg on August 11, 2022  8:37 AM         fluticasone 50 MCG/ACT nasal spray  Commonly known as: FLONASE  1 spray into the nostril(s) as directed by provider Daily.  Last time this was given: 1 spray on August 11, 2022  8:39 AM         guaiFENesin 600 MG 12 hr tablet  Commonly known as: MUCINEX  Take 1,200 mg by mouth 2 (Two) Times a Day.  Last time this was given: 1,200 mg on August 11, 2022  8:37 AM         ipratropium 17 MCG/ACT inhaler  Commonly known as: ATROVENT HFA  Inhale 2 puffs Every 4 (Four) Hours As Needed for Wheezing.         ipratropium-albuterol 0.5-2.5 mg/3 ml nebulizer  Commonly known as: DUO-NEB  Take 3 mL by nebulization Every 4 (Four) Hours As Needed for Wheezing.         loratadine 10 MG tablet  Commonly known as: CLARITIN  Take 10 mg by mouth Daily.         metoprolol tartrate 25 MG tablet  Commonly known as: LOPRESSOR  Take 25 mg by mouth 2 (Two) Times a Day.  Last time this was given: 25 mg on August 11, 2022  8:37 AM         ondansetron ODT 4 MG  disintegrating tablet  Commonly known as: ZOFRAN-ODT  Place 4 mg on the tongue Every 6 (Six) Hours As Needed for Nausea or Vomiting.         pantoprazole 40 MG EC tablet  Commonly known as: PROTONIX  Take 40 mg by mouth Daily.  Last time this was given: 40 mg on August 11, 2022  7:40 AM         promethazine 12.5 MG tablet  Commonly known as: PHENERGAN  Take 12.5 mg by mouth 2 (Two) Times a Day As Needed for Nausea or Vomiting.            Where to  your medications     Icon medication  information                     these medications at [facility]     atorvastatin • budesonide-formoterol • clopidogrel    Address:  [address] [place] [address]   Hours: 8AM-6PM Mon-Fri   Phone: [phone]           Always carry an updated list of your medications with you. If there is an emergency, a responder can quickly see what medications you are taking. Take this paperwork with you the next time you see your health care provider.            [employer] Sign-Up      Icon List of Attachments     Attached Information  Acute Respiratory Failure  Adult (English)  Acute Respiratory Failure, Adult  ?  Acute respiratory failure is a condition that is a medical emergency. It can develop quickly, and it should be treated right away. There are two types of acute respiratory failure:  · Type I respiratory failure is when the lungs are not able to get enough oxygen into the blood. This causes the blood oxygen level to drop.  · Type II respiratory failure is when carbon dioxide is not passing from the lungs out of the body. This causes carbon dioxide to build up in the blood.  A person may have one type of acute respiratory failure or have both types at the same time.  What are the causes?  Common causes of type I respiratory failure include:  · Trauma to the lung, chest, ribs, or tissues around the lung.  · Pneumonia.  · Lung diseases, such as pulmonary fibrosis or asthma.  · Smoke, chemical, or  water inhalation.  · A blood clot in the lungs (pulmonary embolism).  · A blood infection (sepsis).  · Heart attack.  Common causes of type II respiratory failure include:  · Stroke.  · A spinal cord injury.  · A drug or alcohol overdose.  · A blood infection (sepsis).  · Cardiac arrest.  What increases the risk?  This condition is more likely to develop in people who have:  · Lung diseases such as asthma or chronic obstructive pulmonary disease (COPD).  · A condition that damages or weakens the muscles, nerves, bones, or tissues that are involved in breathing, such as myasthenia gravis or Guillain-Barré syndrome.  · A serious infection.  · A health problem that blocks the unconscious reflex that is involved in breathing, such as hypothyroidism or sleep apnea.  What are the signs or symptoms?  Trouble breathing is the main symptom of acute respiratory failure. Symptoms may also include:  · Fast breathing.  · Restlessness or anxiety.  · Breathing loudly (wheezing) and grunting.  · Fast or irregular heartbeats (palpitations).  · Confusion or changes in behavior.  · Feeling tired (fatigue), sleeping more than normal, or being hard to wake.  · Skin, lips, or fingernails that appear blue (cyanosis).  How is this diagnosed?  ?  This condition may be diagnosed based on:  · Your medical history and a physical exam. Your health care provider will listen to your heart and lungs to check for abnormal sounds.  · Tests to confirm the diagnosis and determine the cause of respiratory failure. These tests may include:  ? Measuring the amount of oxygen in your blood (pulse oximetry). The measurement comes from a small device that is placed on your finger, earlobe, or toe.  ? Blood tests to measure blood oxygen and carbon dioxide and to look for signs of infection.  ? Tests on a sample of the fluid that surrounds the spinal cord (cerebrospinal fluid) or a sample of fluid that is drawn from the windpipe (trachea) to check for  infections.  ? Chest X-ray.  ? Electrocardiogram (ECG) to look at the heart's electrical activity.  How is this treated?  Treatment for this condition usually takes place in a hospital intensive care unit (ICU). Treatment depends on what is causing the condition. It may include one or more of these treatments:  · Oxygen may be given through your nose or a face mask.  · A device such as a continuous positive airway pressure (CPAP) machine or bi-level positive airway pressure (BPAP) machine may be used to help you breathe. The device gives you oxygen and pressure.  · Breathing treatments, fluids, and other medicines may be given.  · A ventilator may be used to help you breathe. The machine gives you oxygen and pressure. A tube is put into your mouth and trachea to connect the ventilator.  ? If this treatment is needed longer term, a tracheostomy may be placed. A tracheostomy is a breathing tube put through your neck into your trachea.  · In extreme cases, extracorporeal life support (ECLS) may be used. This treatment temporarily takes over the function of the heart and lungs, supplying oxygen and removing carbon dioxide. ECLS gives the lungs a chance to recover.  Follow these instructions at home:  Medicines  · Take over-the-counter and prescription medicines only as told by your health care provider.  · If you were prescribed an antibiotic medicine, take it as told by your health care provider. Do not stop using the antibiotic even if you start to feel better.  · If you are taking blood thinners:  ? Talk with your health care provider before you take any medicines that contain aspirin or NSAIDs, such as ibuprofen. These medicines increase your risk for dangerous bleeding.  ? Take your medicine exactly as told, at the same time every day.  ? Avoid activities that could cause injury or bruising, and follow instructions about how to prevent falls.  ? Wear a medical alert bracelet or carry a card that lists what  medicines you take.  General instructions  · Return to your normal activities as told by your health care provider. Ask your health care provider what activities are safe for you.  · Do not use any products that contain nicotine or tobacco, such as cigarettes, e-cigarettes, and chewing tobacco. If you need help quitting, ask your health care provider.  · Do not drink alcohol if:  ? Your health care provider tells you not to drink.  ? You are pregnant, may be pregnant, or are planning to become pregnant.  · Wear compression stockings as told by your health care provider. These stockings help to prevent blood clots and reduce swelling in your legs.  · Attend any physical therapy and pulmonary rehabilitation as told by your health care provider.  · Keep all follow-up visits as told by your health care provider. This is important.  How is this prevented?  · If you have an infection or a medical condition that may lead to acute respiratory failure, make sure you get proper treatment.  Contact a health care provider if:  · You have a fever.  · Your symptoms do not improve or they get worse.  Get help right away if:  · You are having trouble breathing.  · You lose consciousness.  · You develop a fast heart rate.  · Your fingers, lips, or other areas turn blue.  · You are confused.  These symptoms may represent a serious problem that is an emergency. Do not wait to see if the symptoms will go away. Get medical help right away. Call your local emergency services (911 in the [place] Do not drive yourself to the hospital.  Summary  · Acute respiratory failure is a medical emergency. It can develop quickly, and it should be treated right away.  · Treatment for this condition usually takes place in a hospital intensive care unit (ICU). Treatment may include oxygen, fluids, and medicines. A device may be used to help you breathe, such as a ventilator.  · Take over-the-counter and prescription medicines only as told by your health  care provider.  · Contact a health care provider if your symptoms do not improve or if they get worse.  This information is not intended to replace advice given to you by your health care provider. Make sure you discuss any questions you have with your health care provider.  Document Revised: 12/04/2020 Document Reviewed: 12/04/2020  Elsevier Patient Education © 2021 [employer]         Icon List of Attachments     Attached Information  Symptoms of COVID-19 - [facility] (02/22/2021) (English)  Symptoms of COVID-19  Watch for symptoms  People with COVID-19 have had a wide range of symptoms reported - ranging from mild symptoms to severe illness. Symptoms may appear 2-14 days after exposure to the virus. Anyone can have mild to severe symptoms. People with these symptoms may have COVID-19:  · Fever or chills  · Cough  · Shortness of breath or difficulty breathing  · Fatigue  · Muscle or body aches  · Headache  · New loss of taste or smell  · Sore throat  · Congestion or runny nose  · Nausea or vomiting  · Diarrhea  This list does not include all possible symptoms. [facility] will continue to update this list as we learn more about COVID-19. Older adults and people who have severe underlying medical conditions like heart or lung disease or diabetes seem to be at higher risk for developing more serious complications from COVID-19 illness.  When to seek emergency medical attention  Look for emergency warning signs* for COVID-19. If someone is showing any of these signs, seek emergency medical care immediately:  · Trouble breathing  · Persistent pain or pressure in the chest  · New confusion  · Inability to wake or stay awake  · Pale, gray, or blue-colored skin, lips, or nail beds, depending on skin tone  *This list is not all possible symptoms. Please call your medical provider for any other symptoms that are severe or concerning to you.   Call 911 or call ahead to your local emergency facility: Notify the  that you are  seeking care for someone who has or may have COVID-19.  If you are sick  · Check symptoms with a Coronavirus Self-  · Get tested  · What to do if you are sick  · Isolate if you are sick  · When to quarantine  · How to care for someone who is sick  Difference between COVID-19 & flu  Influenza (Flu) and COVID-19 are both contagious respiratory illnesses, but they are caused by different viruses. COVID-19 is caused by infection with a new coronavirus (called SARS-CoV-2), and flu is caused by infection with influenza viruses.   COVID-19 seems to spread more easily than flu and causes more serious illnesses in some people. It can also take longer before people show symptoms and people can be contagious for longer. More information about differences between flu and COVID-19 is available in the different sections below.   Because some of the symptoms of flu and COVID-19 are similar, it may be hard to tell the difference between them based on symptoms alone, and testing may be needed to help confirm a diagnosis.  While more is learned every day about COVID-19 and the virus that causes it, there is still a lot that is unknown . This page compares COVID-19 and flu, given the best available information to date.  Centers for Disease Control and Prevention  Content source: National Center for Immunization and Respiratory Diseases (NCIRD), Division of Viral Diseases  02/22/2021  This information is not intended to replace advice given to you by your health care provider. Make sure you discuss any questions you have with your health care provider.  Document Revised: 11/01/2021 Document Reviewed: 11/01/2021  Elsevier Patient Education © 2021 [employer]         Icon List of Attachments     Attached Information  Clopidogrel Tablets (English)  Clopidogrel Tablets  What is this medicine?  CLOPIDOGREL (kloh PID oh grel) helps to prevent blood clots. This medicine is used to prevent heart attack, stroke, or other vascular  events in people who are at high risk.  This medicine may be used for other purposes; ask your health care provider or pharmacist if you have questions.  COMMON BRAND NAME(S): Plavix  What should I tell my health care provider before I take this medicine?  They need to know if you have any of the following conditions:  · bleeding disorders  · bleeding in the brain  · having surgery  · history of stomach bleeding  · an unusual or allergic reaction to clopidogrel, other medicines, foods, dyes, or preservatives  · pregnant or trying to get pregnant  · breast-feeding  How should I use this medicine?  Take this medicine by mouth with a glass of water. Follow the directions on the prescription label. You may take this medicine with or without food. If it upsets your stomach, take it with food. Take your medicine at regular intervals. Do not take it more often than directed. Do not stop taking except on your doctor's advice.  A special MedGuide will be given to you by the pharmacist with each prescription and refill. Be sure to read this information carefully each time.  Talk to your pediatrician regarding the use of this medicine in children. Special care may be needed.  Overdosage: If you think you have taken too much of this medicine contact a poison control center or emergency room at once.  NOTE: This medicine is only for you. Do not share this medicine with others.  What if I miss a dose?  If you miss a dose, take it as soon as you can. If it is almost time for your next dose, take only that dose. Do not take double or extra doses.  What may interact with this medicine?  Do not take this medicine with the following medications:  · dasabuvir; ombitasvir; paritaprevir; ritonavir  · defibrotide  · selexipag  This medicine may also interact with the following medications:  · certain medicines that treat or prevent blood clots like warfarin  · narcotic medicines for pain  · NSAIDs, medicines for pain and inflammation,  like ibuprofen or naproxen  · repaglinide  · SNRIs, medicines for depression, like desvenlafaxine, duloxetine, levomilnacipran, venlafaxine  · SSRIs, medicines for depression, like citalopram, escitalopram, fluoxetine, fluvoxamine, paroxetine, sertraline  · stomach acid blockers like cimetidine, esomeprazole, omeprazole  This list may not describe all possible interactions. Give your health care provider a list of all the medicines, herbs, non-prescription drugs, or dietary supplements you use. Also tell them if you smoke, drink alcohol, or use illegal drugs. Some items may interact with your medicine.  What should I watch for while using this medicine?  Visit your doctor or health care professional for regular check-ups. Do not stop taking your medicine unless your doctor tells you to.  Notify your doctor or health care professional and seek emergency treatment if you develop breathing problems; changes in vision; chest pain; severe, sudden headache; pain, swelling, warmth in the leg; trouble speaking; sudden numbness or weakness of the face, arm or leg. These can be signs that your condition has gotten worse.  If you are going to have surgery or dental work, tell your doctor or health care professional that you are taking this medicine.  Certain genetic factors may reduce the effect of this medicine. Your doctor may use genetic tests to determine treatment.  Only take aspirin if you are instructed to. Low doses of aspirin are used with this medicine to treat some conditions. Taking aspirin with this medicine can increase your risk of bleeding so you must be careful. Talk to your doctor or pharmacist if you have questions.  What side effects may I notice from receiving this medicine?  Side effects that you should report to your doctor or health care professional as soon as possible:  · allergic reactions like skin rash, itching or hives, swelling of the face, lips, or tongue  · signs and symptoms of bleeding such  as bloody or black, tarry stools; red or dark-brown urine; spitting up blood or brown material that looks like coffee grounds; red spots on the skin; unusual bruising or bleeding from the eye, gums, or nose  · signs and symptoms of a blood clot such as breathing problems; changes in vision; chest pain; severe, sudden headache; pain, swelling, warmth in the leg; trouble speaking; sudden numbness or weakness of the face, arm or leg  · signs and symptoms of low blood sugar such as feeling anxious; confusion; dizziness; increased hunger; unusually weak or tired; increased sweating; shakiness; cold, clammy skin; irritable; headache; blurred vision; fast heartbeat; loss of consciousness  Side effects that usually do not require medical attention (report to your doctor or health care professional if they continue or are bothersome):  · constipation  · diarrhea  · headache  · upset stomach  This list may not describe all possible side effects. Call your doctor for medical advice about side effects. You may report side effects to FDA at 1-438-CSH-5845.  Where should I keep my medicine?  Keep out of the reach of children.  Store at room temperature of 59 to 86 degrees F (15 to 30 degrees C). Throw away any unused medicine after the expiration date.  NOTE: This sheet is a summary. It may not cover all possible information. If you have questions about this medicine, talk to your doctor, pharmacist, or health care provider.  © 2021 Elsevier/Gold Standard (2021-11-19 15:33:52)                       Opioid Resource    If you or someone you know needs information on substance abuse, please visit   https://www.findhelpnowky.org/ for listings of facilities and resources across [place].        Stroke Symptoms    · Call 911 or have someone take you to the Emergency Department if you have any of the following:  · Sudden numbness or weakness of your face, arm or leg especially on one side of the body  · Sudden confusion, difficulty  speaking or trouble understanding   · Changes in your vision or loss of sight in one eye  · Sudden severe headache with no known cause  · Sudden dizziness, trouble walking, loss of balance or coordination     It is important to seek emergency care right away if you have further stroke symptoms. If you get emergency help quickly, the powerful clot-dissolving medicines can reduce the disabilities caused by a stroke.      For more information:  American Stroke Association  2-323-8-STROKE  www.strokeassociation.org        Smoking Cessation    IF YOU SMOKE OR USE TOBACCO PLEASE READ THE FOLLOWING:  Why is smoking bad for me?  Smoking increases the risk of heart disease, lung disease, vascular disease, stroke, and cancer. If you smoke, STOP!     For more information:  Quit Now [place]  1-800-QUIT-NOW  https://[email or web address]-[email or web address]/en-US/        Suicidal Feelings    If you feel like life is too tough and are thinking of suicide or injuring yourself, get help right away!  · Call or text [phone] to speak to someone.           Patient Experience    Thank you for choosing [facility]. You may receive a survey following your visit. Please take a moment to share what went well, where we need improvement, and which staff members deserve recognition. We value your input.               ? ?                YOU ARE THE MOST IMPORTANT FACTOR IN YOUR RECOVERY.      Follow all instructions carefully.      I have reviewed my discharge instructions with my nurse, including the following information, if applicable:                 Information about my illness and diagnosis              Follow up appointments (including lab draws)              Wound Care              Equipment Needs              Medications (new and continuing) along with side effects              Preventative information such as vaccines and smoking cessations              Diet              Pain              I know when to contact my Doctor's office or seek emergency  care        I want my nurse to describe the side effects of my medications: YES NO   If the answer is no, I understand the side effects of my medications: YES NO   My nurse described the side effects of my medications in a way that I could understand: YES NO   I have taken my personal belongings and my own medications with me at discharge: YES NO          I have received this information and my questions have been answered. I have discussed any concerns I see with this plan with the nurse or physician. I understand these instructions.     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   Primary:    [facility] [id number]   [id number]      Referring Provider Information:  RHINA DELEON Phone: [phone] Fax: [phone]       Referral Information:   # Visits:  999 Referral Type: Home Health [42]   Urgency:  Routine Referral Reason: Specialty Services Required   Start Date: Aug 11, 2022 End Date:  To be determined by Insurer   Diagnosis: Acute on chronic respiratory failure with hypoxia (HCC) (J96.21 [ICD-10-CM] 518.84,799.02 [ICD-9-CM])      Refer to Dept:   Refer to Provider:   Refer to Provider Phone:   Refer to Facility:       Face to Face Visit Date: 2022  Follow-up provider for Plan of Care? I treated the patient in an acute care facility and will not continue treatment after discharge.  Follow-up provider: LAVINIA PULIDO  [[id number]]  Reason/Clinical Findings: general debility  Describe mobility limitations that make leaving home difficult: advanced copd, 5L oxygen dependent, hx of CVA  Nursing/Therapeutic Services Requested: Physical Therapy  PT orders: Strengthening  Frequency: 1 Week 1     This document serves as a request of services and does not constitute Insurance authorization or approval of services.  To determine eligibility, please contact the members Insurance carrier to verify and review coverage.     If you have medical questions regarding this request for services. Please contact [address] at [phone] during normal business hours.        Authorizing Provider:Viral Waldron DO  Authorizing Provider's NPI: [id number]  Order Entered By: Viral Waldron DO 2022 12:28 PM     Electronically signed by: Viral Waldron DO 2022 12:28 PM          Discharge Summary      Viral Waldron DO at 22 1242              [facility] HOSPITALIST MEDICINE DISCHARGE SUMMARY    Patient Identification:  Name:  Emilio Guy  Age:  56 y.o.  Sex:  male  :  1965  MRN:  [id number]  Visit Number:  [id number]    Date of Admission: 2022  Date of Discharge: 2022    PCP: Valarie Garcia, ANGELA    DISCHARGE DIAGNOSIS   1.  Acute on chronic hypoxic/hypercapnic respiratory failure  2.  COPD exacerbation  3.  COVID-19  4.  Subacute MCA CVA  5.  Essential hypertension  6.  Hyperlipidemia  7.  Tobacco abuse  8.  General deconditioning      CONSULTS  1. Dr. Espinosa, Pulmonology  2. Dr. Matamoros, Palliative Care  3. Dr. Joseph, Teleneurology      PROCEDURES PERFORMED   None      HOSPITAL COURSE  Mr. Guy is a 56 y.o. male who presented to [facility] ED on 2022 with a chief complaint of altered mental status.  Patient has a past medical history remarkable for COPD, tobacco abuse, alcohol abuse, COPD, essential hypertension and chronic  diastolic CHF.  It should be noted patient was brought to the emergency department by family members as he did have altered mental status and all history was obtained from family or emergency room personnel.  Secondary to altered mental status, patient was brought to the emergency department for further treatment and evaluation.  Initial evaluation emergency department did consist of basic laboratory work as well as physical exam and vital signs.  Initial vital signs from patient's blood pressure 132/78, respirations 14, heart rate 104 and oxygen saturation 91% on 3 L nasal cannula.  Initial lab work did include ABG, CBC and CMP.  ABG demonstrated pH of 7.28, PCO2 84, PO2 51 on 3 L nasal cannula.  Patient was then placed on BiPAP with some improvement in ABG.  Repeat ABG demonstrated pH of 7.33, PCO2 72, PO2 66 with an oxygen saturation 93% with BiPAP settings of 24/8 and FiO2 40%.  BMP demonstrated hyponatremia of 126 but otherwise was within normal limits.  CBC was within normal limits.  COVID-19 antigen was positive.  Chest x-ray demonstrated chronic opacity in right middle lobe.  CT of chest demonstrated emphysematous changes with poorly defined patchy opacities in the posterior right upper lobe and some consolidation in the right lower lobe likely reflecting mild pneumonia.  CT findings were not typical of COVID-19 pneumonia.  Secondary to altered mental status, teleneurology services were consulted.  After thorough evaluation, patient did have CT head without contrast demonstrating areas of low attenuation volume loss in the left cerebral hemisphere.  This was concerning for late subacute or possible chronic left cerebral hemisphere watershed infarcts.  Recommendation was made to obtain MRI and patient was admitted for further treatment and evaluation.    In regards to acute on chronic hypoxic/hypercapnic respiratory failure, patient was treated for COPD exacerbation with possible underlying pneumonia.   Patient was continued on antibiotic therapy and eventually was able to be taken off BiPAP therapy.  Patient does require 5 L nasal cannula at home and patient was able to return to 5 L nasal cannula oxygen supplementation prior to date of discharge.  It should be noted it is felt patient's hypoxic respiratory failure was secondary to COPD and not COVID-19.  Patient did test positive for COVID-19 but this is not felt to be contributing to patient's hospitalization.    In regards to altered mental status, this was likely multifactorial from acute on chronic hypoxic/hypercapnic respiratory failure in the setting of subacute MCA watershed distribution CVA.  Teleneurology services did evaluate the patient and recommended MRI.  Unfortunately, patient felt claustrophobic and would not agree to MRI.  As such, recommendation was made to continue aspirin 81 mg p.o. daily as well as Plavix 75 mg p.o. daily and high intensity statin with atorvastatin 80 mg p.o. nightly.  Patient was seen by physical therapy during this hospitalization who did recommend inpatient rehab upon discharge as patient did have physical deconditioning.  Unfortunately, patient's insurance company did not agree with this assessment and denied patient acceptance to inpatient rehab.  Patient was offered placement in skilled nursing facility or returning home with home health physical therapy.  Patient has elected to return home with home health physical therapy.  With this in mind, it is felt patient has reached maximum medical benefit of current hospitalization and will be discharged home in stable condition today.  The beforementioned plan was thoroughly discussed with the patient and he expressed his understanding and willingness to proceed with the beforementioned plan.    VITAL SIGNS:      08/09/22  0500 08/10/22  0500 08/11/22  0523   Weight: 77.2 kg (170 lb 3.2 oz) 76.7 kg (169 lb 1.6 oz) 75.1 kg (165 lb 9.6 oz)     Body mass index is 24.45  kg/m².    PHYSICAL EXAM:  Constitutional: Chronically ill-appearing  male in no apparent distress.     HENT:  Head:  Normocephalic and atraumatic.  Mouth:  Moist mucous membranes.    Eyes:  Conjunctivae and EOM are normal.  Pupils are equal, round, and reactive to light.  No scleral icterus.    Neck:  Neck supple. No thyromegaly.  No JVD present.    Cardiovascular:  Regular rate and rhythm with no murmurs, rubs, clicks or gallops appreciated.  Pulmonary/Chest:  Clear to auscultation bilaterally with no crackles, wheezes or rhonchi appreciated.  Abdominal:  Soft. Nontender. Nondistended  Bowel sounds are normal in all four quadrants. No organomegally appreciated.   Musculoskeletal:  5/5 muscle strength bilateral upper and lower extremities with equal muscle tone and bulk. No edema, no tenderness, and no deformity.  No red or swollen joints anywhere.    Neurological:  Alert and oriented to person, place, and time. Cranial nerves II-XII intact with no focal deficits.  No facial droop.  No slurred speech.   Skin:  Warm and dry to palpation with no rashes or lesions appreciated.  Peripheral vascular:  2+ radial and pedal pulses in bilateral upper and lower extremities.  Psychiatric:  Alert and oriented x3, demonstrates appropriate judgment and insight.    DISCHARGE DISPOSITION   Stable    DISCHARGE MEDICATIONS:     Discharge Medications      New Medications      Instructions Start Date   clopidogrel 75 MG tablet  Commonly known as: PLAVIX   75 mg, Oral, Daily   Start Date: August 12, 2022        Changes to Medications      Instructions Start Date   atorvastatin 80 MG tablet  Commonly known as: LIPITOR  What changed:   · medication strength  · how much to take   80 mg, Oral, Nightly         Continue These Medications      Instructions Start Date   albuterol 1.25 MG/3ML nebulizer solution  Commonly known as: ACCUNEB   1 ampule, Nebulization, 2 Times Daily PRN      Aspirin Low Dose 81 MG EC tablet  Generic drug:  aspirin   81 mg, Oral, Daily      busPIRone 5 MG tablet  Commonly known as: BUSPAR   5 mg, Oral, 2 Times Daily PRN      fluticasone 50 MCG/ACT nasal spray  Commonly known as: FLONASE   1 spray, Nasal, Daily      guaiFENesin 600 MG 12 hr tablet  Commonly known as: MUCINEX   1,200 mg, Oral, 2 Times Daily      ipratropium 17 MCG/ACT inhaler  Commonly known as: ATROVENT HFA   2 puffs, Inhalation, Every 4 Hours PRN      ipratropium-albuterol 0.5-2.5 mg/3 ml nebulizer  Commonly known as: DUO-NEB   3 mL, Nebulization, Every 4 Hours PRN      loratadine 10 MG tablet  Commonly known as: CLARITIN   10 mg, Oral, Daily      metoprolol tartrate 25 MG tablet  Commonly known as: LOPRESSOR   25 mg, Oral, 2 Times Daily      ondansetron ODT 4 MG disintegrating tablet  Commonly known as: ZOFRAN-ODT   4 mg, Translingual, Every 6 Hours PRN      pantoprazole 40 MG EC tablet  Commonly known as: PROTONIX   40 mg, Oral, Daily      promethazine 12.5 MG tablet  Commonly known as: PHENERGAN   12.5 mg, Oral, 2 Times Daily PRN                   Additional Instructions for the Follow-ups that You Need to Schedule     Ambulatory Referral to Home Health   As directed      Face to Face Visit Date: 8/11/2022    Follow-up provider for Plan of Care?: I treated the patient in an acute care facility and will not continue treatment after discharge.    Follow-up provider: VALARIE PULIDO [[id number]]    Reason/Clinical Findings: general debility    Describe mobility limitations that make leaving home difficult: advanced copd, 5L oxygen dependent, hx of CVA    Nursing/Therapeutic Services Requested: Physical Therapy    PT orders: Strengthening    Frequency: 1 Week 1         Discharge Follow-up with PCP   As directed       Currently Documented PCP:    Valarie Pulido APRN    PCP Phone Number:    [phone]     Follow Up Details: please follow up with pcp in 2-3 weeks            Follow-up Information     Valarie Pulido APRN .     Specialty: Family Medicine  Why: please follow up with pcp in 2-3 weeks  Contact information:  [address]  [facility] [address]  [phone]                         TEST  RESULTS PENDING AT DISCHARGE       The ASCVD Risk score (Pauleli SPENCE Jr., et al., 2013) failed to calculate for the following reasons:    The patient has a prior MI or stroke diagnosis     Viral Deleon DO  08/11/22  12:42 EDT    Please note that this discharge summary required more than 30 minutes to complete.    Please send a copy of this dictation to the following providers:  Valarie Garcia APRN    Electronically signed by Viral Deleon DO at 08/11/22 1250       Discharge Order (From admission, onward)     Start     Ordered    08/11/22 1219  Discharge patient  Once        Expected Discharge Date: 08/11/22    Expected Discharge Time: Afternoon    Discharge Disposition: Home or Self Care    Physician of Record for Attribution - Please select from Treatment Team: VIRAL DELEON [[id number]]    Review needed by CMO to determine Physician of Record: No       Question Answer Comment   Physician of Record for Attribution - Please select from Treatment Team VIRAL DELEON    Review needed by CMO to determine Physician of Record No        08/11/22 1228

## 2022-08-11 NOTE — THERAPY DISCHARGE NOTE
Acute Care - Physical Therapy Treatment Note/Discharge   [place]     Patient Name: Emilio Guy  : 1965  MRN: [id number]  Today's Date: 2022   Onset of Illness/Injury or Date of Surgery: 22     Referring Physician: Adelso      Admit Date: 2022    Visit Dx:    ICD-10-CM ICD-9-CM   1. Acute respiratory failure with hypoxia and hypercapnia (HCC)  J96.01 518.81    J96.02    2. COVID-19  U07.1 079.89   3. Cerebrovascular accident (CVA), unspecified mechanism ([facility])  I63.9 434.91     Patient Active Problem List   Diagnosis   • COPD with acute exacerbation (HCC)   • Acute on chronic respiratory failure with hypoxia (HCC)   • Acute respiratory failure with hypoxia and hypercapnia (HCC)     Past Medical History:   Diagnosis Date   • CHF (congestive heart failure) (HCC)    • COPD (chronic obstructive pulmonary disease) ([facility])      No past surgical history on file.    PT Assessment (last 12 hours)     PT Evaluation and Treatment     Row Name 22          Physical Therapy Time and Intention    Document Type therapy note (daily note)  -CS     Mode of Treatment physical therapy  -CS     Session Not Performed --  Pt states he did not sleep well last PM and requested to rest.  -CS     Patient Effort good  -CS     Comment Pt seen bedside this AM.  Pt anticipates d/c home this day.  -CS     Row Name 22          General Information    Patient Profile Reviewed yes  -CS     Existing Precautions/Restrictions seizures  -CS     Row Name 22          Living Environment    Primary Care Provided by self;parent(s)  Mother assist with meals, laundry  -CS     Row Name 22          Home Use of Assistive/Adaptive Equipment    Equipment Currently Used at Home cane, quad;oxygen;nebulizer;other (see comments)  Shana [employer] for Cane  -CS     Row Name 22          Cognition    Orientation Status (Cognition) oriented x 4  -CS     Row Name 22          Range  of Motion Comprehensive    General Range of Motion no range of motion deficits identified  -     Row Name 08/11/22 0830          Strength Comprehensive (MMT)    General Manual Muscle Testing (MMT) Assessment upper extremity strength deficits identified  -     Row Name 08/11/22 0830          Vision Assessment/Intervention    Visual Impairment/Limitations WFL  -CS     Row Name 08/11/22 0830          Bed Mobility    Bed Mobility bed mobility (all) activities  -CS     All Activities, [place] (Bed Mobility) minimum assist (75% patient effort);contact guard  -CS     Assistive Device (Bed Mobility) bed rails;draw sheet;head of bed elevated  -CS     Row Name 08/11/22 0830          Transfers    Transfers sit-stand transfer;stand-sit transfer  -CS     Sit-Stand [place] (Transfers) contact guard  -CS     Stand-Sit [place] (Transfers) contact guard  -CS     Row Name 08/11/22 0830          Sit-Stand Transfer    Assistive Device (Sit-Stand Transfers) other (see comments)  no AD  -CS     Row Name 08/11/22 0830          Stand-Sit Transfer    Assistive Device (Stand-Sit Transfers) other (see comments)  no AD  -CS     Row Name 08/11/22 0830          Gait/Stairs (Locomotion)    Gait/Stairs Locomotion gait/ambulation independence;gait/ambulation assistive device;distance ambulated  -     [place] Level (Gait) standby assist  -CS     Assistive Device (Gait) other (see comments)  no AD, close CGA/HHA  -     Row Name 08/11/22 0830          Safety Issues, Functional Mobility    Impairments Affecting Function (Mobility) balance;endurance/activity tolerance;shortness of breath  -     Row Name 08/11/22 0830          Respiratory WDL    Respiratory WDL X;breath sounds;cough  -CS     Rhythm/Pattern, Respiratory unlabored;pattern regular;depth regular;no shortness of breath reported  -CS     Expansion/Accessory Muscles/Retractions expansion symmetric;no retractions;no use of accessory muscles  -CS     Cough Frequency  frequent  -CS     Cough Type productive  -CS     Row Name 08/11/22 0830          Breath Sounds    Breath Sounds All Fields  -CS     All Lung Fields Breath Sounds Anterior:;Posterior:;diminished;wheezes, expiratory  -CS     BORIS Breath Sounds Posterior:;clear  -CS     LLL Breath Sounds diminished  -CS     RUL Breath Sounds wheezes, inspiratory  -CS     RLL Breath Sounds diminished  -CS     Row Name 08/11/22 0830          Skin WDL    Skin WDL WDL  -CS     Skin Color/Characteristics without discoloration  -CS     Skin Temperature warm  -CS     Skin Moisture dry  -CS     Skin Elasticity quick return to original state  -CS     Skin Integrity intact  -CS     Row Name 08/11/22 0830          Coping    Observed Emotional State calm;cooperative;pleasant  -CS     Verbalized Emotional State acceptance  -CS     Family/Support Persons family  -CS     Involvement in Care not present at bedside  -CS     Row Name 08/11/22 0830          Positioning and Restraints    Pre-Treatment Position in bed  -CS     Post Treatment Position bed  -CS     In Bed call light within reach;with nsg  -CS     Row Name 08/11/22 0830          Therapy Assessment/Plan (PT)    Rehab Potential (PT) fair, will monitor progress closely  -CS     Criteria for Skilled Interventions Met (PT) yes;meets criteria;skilled treatment is necessary  -CS     Therapy Frequency (PT) 3 times/wk  3-5x/week  -CS     Problem List (PT) problems related to;balance;mobility;strength  -CS     Activity Limitations Related to Problem List (PT) unable to ambulate safely;unable to transfer safely  -CS     Row Name 08/11/22 0830          Physical Therapy Goals    Bed Mobility Goal Selection (PT) bed mobility, PT goal 1  -CS     Transfer Goal Selection (PT) transfer, PT goal 1  -CS     Gait Training Goal Selection (PT) gait training, PT goal 1  -CS     Row Name 08/11/22 0830          Bed Mobility Goal 1 (PT)    Activity/Assistive Device (Bed Mobility Goal 1, PT) bed mobility activities, all   -CS     [place] Level/Cues Needed (Bed Mobility Goal 1, PT) standby assist  -CS     Time Frame (Bed Mobility Goal 1, PT) long term goal (LTG);by discharge  -CS     Progress/Outcomes (Bed Mobility Goal 1, PT) goal met  -CS     Row Name 08/11/22 0830          Transfer Goal 1 (PT)    Activity/Assistive Device (Transfer Goal 1, PT) transfers, all  -CS     [place] Level/Cues Needed (Transfer Goal 1, PT) standby assist  -CS     Time Frame (Transfer Goal 1, PT) long term goal (LTG);by discharge  -CS     Progress/Outcome (Transfer Goal 1, PT) goal met  -CS     Row Name 08/11/22 0830          Gait Training Goal 1 (PT)    Activity/Assistive Device (Gait Training Goal 1, PT) gait (walking locomotion);assistive device use  -CS     [place] Level (Gait Training Goal 1, PT) standby assist  -CS     Distance (Gait Training Goal 1, PT) 50'  -CS     Time Frame (Gait Training Goal 1, PT) long term goal (LTG);by discharge  -CS     Progress/Outcome (Gait Training Goal 1, PT) goal partially met  -CS     Row Name 08/11/22 0830          Patient Education Goal (PT)    Activity (Patient Education Goal, PT) Pt education on energy conservation including planning, arrangement, and breathing techniques to optimize the efficiency of a given task.  -CS     Row Name 08/11/22 0830          Discharge Summary (PT)    Additional Documentation --  D/C acute PT.  -CS           User Key  (r) = Recorded By, (t) = Taken By, (c) = Cosigned By    Initials Name Provider Type    [facility] PT Physical Therapist                  Physical Therapy Education                 Title: PT OT SLP Therapies (Done)     Topic: Physical Therapy (Done)     Point: Mobility training (Done)     Learning Progress Summary           Patient Acceptance, E, VU by ML at 8/7/2022 0057      Show all documentation for this point (11)                 Point: Home exercise program (Done)     Learning Progress Summary           Patient Acceptance, E, VU by ML at 8/7/2022  0057      Show all documentation for this point (11)                 Point: Body mechanics (Done)     Learning Progress Summary           Patient Acceptance, E, VU by ML at 8/7/2022 0057      Show all documentation for this point (11)                 Point: Precautions (Done)     Learning Progress Summary           Patient Acceptance, E, VU by ML at 8/7/2022 0057      Show all documentation for this point (11)                             User Key     Initials Effective Dates Name Provider Type Discipline     05/10/22 -  Shwetha Lira RNA Registered Nurse Nurse                PT Recommendation and Plan  Anticipated Discharge Disposition (PT): home with assist, inpatient rehabilitation facility  Planned Therapy Interventions (PT): balance training, bed mobility training, gait training, home exercise program, neuromuscular re-education, patient/family education, strengthening, transfer training  Therapy Frequency (PT): 3 times/wk (3-5x/week)  Progress Summary (PT)  Progress Toward Functional Goals (PT): progress toward functional goals is gradual  Daily Progress Summary (PT): Pt progressing slowly limited by endurance.  Plan of Care Reviewed With: patient  Progress: no change  Outcome Evaluation: Pt progressing slowly limited by endurance.         Time Calculation:    PT Charges     Row Name 08/11/22 1132             Time Calculation    PT Received On 08/11/22  -CS              Timed Charges    52848 - PT Self Care/Mgmt Minutes 23  -CS              Total Minutes    Timed Charges Total Minutes 23  -CS       Total Minutes 23  -CS            User Key  (r) = Recorded By, (t) = Taken By, (c) = Cosigned By    Initials Name Provider Type    CS [facility], PT Physical Therapist              Therapy Charges for Today     Code Description Service Date Service Provider Modifiers Qty    96771284450 HC PT THER PROC EA 15 MIN 8/10/2022 [facility], PT GP 1    95868815796 HC GAIT TRAINING EA 15 MIN 8/10/2022 Kaiser Morley,  PT GP 1    97154549030 HC PT SELF CARE/MGMT/TRAIN EA 15 MIN 8/11/2022 Kaiser Morley, PT GP 2               PT Discharge Summary  Anticipated Discharge Disposition (PT): home with assist, inpatient rehabilitation facility    [facility], PT  8/11/2022

## 2022-08-11 NOTE — PROGRESS NOTES
" LOS: 17 days     Chief Complaint:  Pulmonology is following for respiratory failure    Subjective     Interval History:     Mr. Guy remains stable, titrated down to oxygen of 3 L/m via nasal cannula. As he did not gain insurance approval for inpatient rehab, planning to return home today with home health assistance.       Review of Systems:     ROS reviewed and were negative.   No major change       Objective     Vital Signs  /62 (BP Location: Left arm, Patient Position: Lying)   Pulse 70   Temp 98.5 °F (36.9 °C) (Oral)   Resp 21   Ht 175.3 cm (69\")   Wt 75.1 kg (165 lb 9.6 oz)   SpO2 98%   BMI 24.45 kg/m²      Physical Exam:  Patient was seen and examined via telemedicine.     General- normal in appearance, not in any acute distress    HEENT- no scleral icterus    Neck-supple    Respiratory-respirations normal-  Cardiovascular-  NSR    GI-nondistended     CNS-nonfocal    Musculoskeletal -no visible edema  Extremities- no obvious deformity noticed     Psychiatric-  Skin- no visible rash                    Results Review:   I reviewed the patient's new clinical results.  I reviewed the patient's new imaging results and agree with the interpretation.        CBC      CBC 8/9/22 8/10/22 8/11/22   WBC 16.74 (A) 16.84 (A) 15.05 (A)   RBC 3.47 (A) 3.87 (A) 3.92 (A)   Hemoglobin 10.7 (A) 11.9 (A) 11.9 (A)   Hematocrit 33.9 (A) 37.5 38.2   MCV 97.7 (A) 96.9 97.4 (A)   MCH 30.8 30.7 30.4   MCHC 31.6 31.7 31.2 (A)   RDW 13.0 13.1 13.2   Platelets 199 188 196   (A) Abnormal value                  CMP      CMP 8/9/22 8/10/22 8/11/22   Glucose 141 (A) 171 (A) 93   BUN 13 11 13   Creatinine 0.58 (A) 0.57 (A) 0.62 (A)   Sodium 138 139 141   Potassium 3.6 3.6 4.3   Chloride 100 102 102   Calcium 8.8 9.1 9.2   Albumin 3.21 (A) 3.11 (A) 3.35 (A)   Total Bilirubin <0.2 0.2 <0.2   Alkaline Phosphatase 52 49 50   AST (SGOT) 21 22 21   ALT (SGPT) 42 (A) 46 (A) 46 (A)   (A) Abnormal value         Comments are available for " some flowsheets but are not being displayed.                 No results found for: SITE, ALLENTEST, PHART, XBH1LJK, PO2ART, HNW8EAK, BASEEXCESS, G8VGYKFU, HGBBG, HCTABG, OXYHEMOGLOBI, METHHGBN, CARBOXYHGB, CO2CT, BAROMETRIC, MODALITY, FIO2        Medication Review:   Scheduled Medications:  albuterol sulfate HFA, 2 puff, Inhalation, 4x Daily - RT  aspirin, 81 mg, Oral, Daily  atorvastatin, 80 mg, Oral, Nightly  budesonide-formoterol, 2 puff, Inhalation, BID - RT  cetirizine, 10 mg, Oral, Daily  clopidogrel, 75 mg, Oral, Daily  enoxaparin, 40 mg, Subcutaneous, Nightly  fluticasone, 1 spray, Nasal, Daily  guaiFENesin, 1,200 mg, Oral, BID  metoprolol tartrate, 25 mg, Oral, Q12H  nicotine, 1 patch, Transdermal, Q24H  nystatin, , Topical, Q12H  pantoprazole, 40 mg, Oral, QAM AC  predniSONE, 10 mg, Oral, Daily With Breakfast  senna-docusate sodium, 2 tablet, Oral, BID  sodium chloride, 10 mL, Intravenous, Q12H  sodium chloride, 10 mL, Intravenous, Q12H      Continuous infusions:         Assessment:   Acute on chronic hypoxic and hypercapnic respiratory failure  COPD exacerbation  Community-acquired pneumonia  COVID-19 positivity   COPD  Chronic diastolic heart failure  Mediastinal adenopathy  Tobacco abuse     History of alcohol use  Encephalopathy  Subacute stroke/chronic appearing left cerebral infarction      Plan:   Continues with NC, now on 3 L/m.   Titrate FIO2 to maintain SPO2 at 90% or greater.   Has home NIV device, previously noncompliant.   Completing steroid taper, now on 10 mg daily  Completed doxycyline, cefepime.   Completed remdesivir  Continue mucinex  Can complete CT imaging in the outpatient setting for follow up of adenopathy.   On symbicort inhaler  On albuterol inhaler  Continues with net negative fluid balance  On nicoderm patches  GI prophylaxis: pantoprazole  DVT prophylaxis: Lovenox  Inpatient rehab denied by insurance - plans to return home with home health assistance upon discharge rather than  SNF.          Oly Islas PA-C  08/11/22  09:29 EDT      Scribed for Dr. Espinosa by Oly Islas PA-C  I, Bennie Espinosa M.D. attest that the above note accurately reflects the work and decisions made  by me.  Patient was seen and evaluated by Dr. Espinosa, including history of present illness, physical exam, assessment, and treatment plan.  The above note was reviewed and edited by Dr. Espinosa.   Case d/w nurse and team   This service is provided via audio video tele medicine   I am in [facility] and patient is in [facility]

## 2022-08-11 NOTE — CASE MANAGEMENT/SOCIAL WORK
Discharge Planning Assessment  [facility]     Patient Name: Emilio Guy  MRN: [id number]  Today's Date: 8/11/2022    Admit Date: 7/24/2022       Discharge Plan     Row Name 08/11/22 1414       Plan    Final Discharge Disposition Code 06 - home with home health care    Final Note Pt to be discharged home with Physician ordered home health.  Pt stated no preference with home health provider.  SS made referral to [facility] per Jyoti Armando at [phone] and faxed pt information to [phone].                 Sirena Colon, HERMESW

## 2022-08-12 ENCOUNTER — READMISSION MANAGEMENT (OUTPATIENT)
Dept: CALL CENTER | Facility: HOSPITAL | Age: 57
End: 2022-08-12

## 2022-08-12 NOTE — OUTREACH NOTE
COVID-19 Week 1 Survey    Flowsheet Row Responses   [facility] patient discharged from? [place]   Does the patient have one of the following disease processes/diagnoses(primary or secondary)? COVID-19   COVID-19 underlying condition? None   Call Number Call 1   Week 1 Call successful? Yes   Call start time 0853   Call end time 0902   Discharge diagnosis COVID-19  Subacute MCA CVA  COPD exacerbation   Person spoke with today (if not patient) and relationship Mother   Meds reviewed with patient/caregiver? Yes   Is the patient having any side effects they believe may be caused by any medication additions or changes? No   Does the patient have all medications ordered at discharge? Yes   Is the patient taking all medications as directed (includes completed medication regime)? Yes   Does the patient have a primary care provider?  Yes   Does the patient have an appointment with their PCP or specialist within 7 days of discharge? No   Nursing Interventions Educated patient on importance of making appointment, Advised patient to make appointment   Has the patient kept scheduled appointments due by today? N/A   What is the Home health agency?   [facility]   Has home health visited the patient within 72 hours of discharge? Call prior to 72 hours   Psychosocial issues? No   Did the patient receive a copy of their discharge instructions? Yes   Did the patient receive a copy of COVID-19 specific instructions? Yes   Nursing interventions Reviewed instructions with patient   What is the patient's perception of their health status since discharge? Improving   Does the patient have any of the following symptoms? Shortness of breath, Cough, Loss of taste/smell   Nursing Interventions Nurse provided patient education   Pulse Ox monitoring Intermittent   Pulse Ox device source Hospital   O2 Sat comments 98% 4LO2   O2 Sat: education provided Sat levels, Monitoring frequency, When to seek care   O2 Sat education comments sats  remaining below 90% call 911/go to ED   Is the patient/caregiver able to teach back steps to recovery at home? Eat a well-balance diet, Rest and rebuild strength, gradually increase activity   If the patient is a current smoker, are they able to teach back resources for cessation? [id number]   Is the patient/caregiver able to teach back the hierarchy of who to call/visit for symptoms/problems? PCP, Specialist, Home health nurse, Urgent Care, ED, 911 Yes   COVID-19 call completed? Yes   Wrap up additional comments Pt states he is feeling a little foggy/weak, and has cough/SOB upon exertion. Pt is using cont 4LO2, and O2 sats 98%. Reviewed AVS/medications with pt. Pt advised to make a PCP fu appt.           BERENICE LE - Registered Nurse

## 2022-08-15 ENCOUNTER — READMISSION MANAGEMENT (OUTPATIENT)
Dept: CALL CENTER | Facility: HOSPITAL | Age: 57
End: 2022-08-15

## 2022-08-15 NOTE — OUTREACH NOTE
COVID-19 Week 1 Survey    Flowsheet Row Responses   [facility] facility patient discharged from? [place]   Does the patient have one of the following disease processes/diagnoses(primary or secondary)? COVID-19   COVID-19 underlying condition? COPD   Call Number Call 2   Week 1 Call successful? No   Discharge diagnosis COVID-19  Subacute MCA CVA  COPD exacerbation          BRANDI REDMAN - Registered Nurse

## 2022-08-22 ENCOUNTER — READMISSION MANAGEMENT (OUTPATIENT)
Dept: CALL CENTER | Facility: HOSPITAL | Age: 57
End: 2022-08-22

## 2022-08-22 NOTE — OUTREACH NOTE
COVID-19 Week 2 Survey    Flowsheet Row Responses   [facility] patient discharged from? [place]   Does the patient have one of the following disease processes/diagnoses(primary or secondary)? COVID-19   COVID-19 underlying condition? COPD   Call Number Call 1   COVID-19 Week 2: Call 1 attempt successful? No   Discharge diagnosis COVID-19  Subacute MCA CVA  COPD exacerbation          LILLI DAMON - Registered Nurse

## 2022-08-29 ENCOUNTER — READMISSION MANAGEMENT (OUTPATIENT)
Dept: CALL CENTER | Facility: HOSPITAL | Age: 57
End: 2022-08-29

## 2022-08-29 NOTE — OUTREACH NOTE
COVID-19 Week 3 Survey    Flowsheet Row Responses   [facility] facility patient discharged from? [place]   Does the patient have one of the following disease processes/diagnoses(primary or secondary)? COVID-19   COVID-19 underlying condition? COPD   Call Number Call 1   COVID-19 Week 3: Call 1 attempt successful? No   Discharge diagnosis COVID-19  Subacute MCA CVA  COPD exacerbation          AJIT LE - Registered Nurse

## 2022-09-26 ENCOUNTER — APPOINTMENT (OUTPATIENT)
Dept: GENERAL RADIOLOGY | Facility: HOSPITAL | Age: 57
End: 2022-09-26

## 2022-09-26 ENCOUNTER — HOSPITAL ENCOUNTER (EMERGENCY)
Facility: HOSPITAL | Age: 57
Discharge: HOME | End: 2022-09-26
Attending: STUDENT IN AN ORGANIZED HEALTH CARE EDUCATION/TRAINING PROGRAM | Admitting: STUDENT IN AN ORGANIZED HEALTH CARE EDUCATION/TRAINING PROGRAM

## 2022-09-26 ENCOUNTER — APPOINTMENT (OUTPATIENT)
Dept: CT IMAGING | Facility: HOSPITAL | Age: 57
End: 2022-09-26

## 2022-09-26 VITALS
BODY MASS INDEX: 24.44 KG/M2 | DIASTOLIC BLOOD PRESSURE: 71 MMHG | SYSTOLIC BLOOD PRESSURE: 116 MMHG | WEIGHT: 165 LBS | HEIGHT: 69 IN | TEMPERATURE: 98.9 F | HEART RATE: 87 BPM | OXYGEN SATURATION: 99 % | RESPIRATION RATE: 20 BRPM

## 2022-09-26 DIAGNOSIS — J44.1 COPD WITH ACUTE EXACERBATION: ICD-10-CM

## 2022-09-26 DIAGNOSIS — R55 SYNCOPE, UNSPECIFIED SYNCOPE TYPE: Primary | ICD-10-CM

## 2022-09-26 LAB
A-A DO2: 102.1 MMHG (ref 0–300)
ALBUMIN SERPL-MCNC: 4.71 G/DL (ref 3.5–5.2)
ALBUMIN/GLOB SERPL: 1.8 G/DL
ALP SERPL-CCNC: 60 U/L (ref 39–117)
ALT SERPL W P-5'-P-CCNC: 10 U/L (ref 1–41)
AMPHET+METHAMPHET UR QL: NEGATIVE
AMPHETAMINES UR QL: NEGATIVE
ANION GAP SERPL CALCULATED.3IONS-SCNC: 11.2 MMOL/L (ref 5–15)
APTT PPP: 29.6 SECONDS (ref 26.5–34.5)
ARTERIAL PATENCY WRIST A: ABNORMAL
AST SERPL-CCNC: 15 U/L (ref 1–40)
ATMOSPHERIC PRESS: 724 MMHG
BARBITURATES UR QL SCN: NEGATIVE
BASE EXCESS BLDA CALC-SCNC: 3.3 MMOL/L (ref 0–2)
BASOPHILS # BLD AUTO: 0.13 10*3/MM3 (ref 0–0.2)
BASOPHILS NFR BLD AUTO: 1 % (ref 0–1.5)
BDY SITE: ABNORMAL
BENZODIAZ UR QL SCN: NEGATIVE
BILIRUB SERPL-MCNC: 0.4 MG/DL (ref 0–1.2)
BILIRUB UR QL STRIP: NEGATIVE
BODY TEMPERATURE: 0 C
BUN SERPL-MCNC: 10 MG/DL (ref 6–20)
BUN/CREAT SERPL: 11.4 (ref 7–25)
BUPRENORPHINE SERPL-MCNC: NEGATIVE NG/ML
CALCIUM SPEC-SCNC: 10.2 MG/DL (ref 8.6–10.5)
CANNABINOIDS SERPL QL: NEGATIVE
CHLORIDE SERPL-SCNC: 97 MMOL/L (ref 98–107)
CLARITY UR: CLEAR
CO2 BLDA-SCNC: 30 MMOL/L (ref 22–33)
CO2 SERPL-SCNC: 26.8 MMOL/L (ref 22–29)
COCAINE UR QL: NEGATIVE
COHGB MFR BLD: 3.5 % (ref 0–5)
COLOR UR: YELLOW
CREAT SERPL-MCNC: 0.88 MG/DL (ref 0.76–1.27)
CRP SERPL-MCNC: 0.85 MG/DL (ref 0–0.5)
D DIMER PPP FEU-MCNC: 0.41 MCGFEU/ML (ref 0–0.5)
D-LACTATE SERPL-SCNC: 1.3 MMOL/L (ref 0.5–2)
DEPRECATED RDW RBC AUTO: 46.9 FL (ref 37–54)
EGFRCR SERPLBLD CKD-EPI 2021: 101.6 ML/MIN/1.73
EOSINOPHIL # BLD AUTO: 0.14 10*3/MM3 (ref 0–0.4)
EOSINOPHIL NFR BLD AUTO: 1.1 % (ref 0.3–6.2)
ERYTHROCYTE [DISTWIDTH] IN BLOOD BY AUTOMATED COUNT: 13.1 % (ref 12.3–15.4)
ETHANOL BLD-MCNC: <10 MG/DL (ref 0–10)
ETHANOL UR QL: <0.01 %
FLUAV RNA RESP QL NAA+PROBE: NOT DETECTED
FLUBV RNA RESP QL NAA+PROBE: NOT DETECTED
GAS FLOW AIRWAY: 4 LPM
GLOBULIN UR ELPH-MCNC: 2.6 GM/DL
GLUCOSE BLDC GLUCOMTR-MCNC: 102 MG/DL (ref 70–130)
GLUCOSE SERPL-MCNC: 105 MG/DL (ref 65–99)
GLUCOSE UR STRIP-MCNC: NEGATIVE MG/DL
HCO3 BLDA-SCNC: 28.6 MMOL/L (ref 20–26)
HCT VFR BLD AUTO: 43.7 % (ref 37.5–51)
HCT VFR BLD CALC: 42.3 % (ref 38–51)
HGB BLD-MCNC: 14 G/DL (ref 13–17.7)
HGB BLDA-MCNC: 13.8 G/DL (ref 14–18)
HGB UR QL STRIP.AUTO: NEGATIVE
HOLD SPECIMEN: NORMAL
HOLD SPECIMEN: NORMAL
IMM GRANULOCYTES # BLD AUTO: 0.15 10*3/MM3 (ref 0–0.05)
IMM GRANULOCYTES NFR BLD AUTO: 1.2 % (ref 0–0.5)
INHALED O2 CONCENTRATION: 36 %
INR PPP: 0.92 (ref 0.9–1.1)
KETONES UR QL STRIP: NEGATIVE
LEUKOCYTE ESTERASE UR QL STRIP.AUTO: NEGATIVE
LYMPHOCYTES # BLD AUTO: 2.56 10*3/MM3 (ref 0.7–3.1)
LYMPHOCYTES NFR BLD AUTO: 20 % (ref 19.6–45.3)
Lab: ABNORMAL
MAGNESIUM SERPL-MCNC: 2.1 MG/DL (ref 1.6–2.6)
MCH RBC QN AUTO: 31.1 PG (ref 26.6–33)
MCHC RBC AUTO-ENTMCNC: 32 G/DL (ref 31.5–35.7)
MCV RBC AUTO: 97.1 FL (ref 79–97)
METHADONE UR QL SCN: NEGATIVE
METHGB BLD QL: 0.2 % (ref 0–3)
MODALITY: ABNORMAL
MONOCYTES # BLD AUTO: 0.81 10*3/MM3 (ref 0.1–0.9)
MONOCYTES NFR BLD AUTO: 6.3 % (ref 5–12)
NEUTROPHILS NFR BLD AUTO: 70.4 % (ref 42.7–76)
NEUTROPHILS NFR BLD AUTO: 9.04 10*3/MM3 (ref 1.7–7)
NITRITE UR QL STRIP: NEGATIVE
NOTE: ABNORMAL
NRBC BLD AUTO-RTO: 0 /100 WBC (ref 0–0.2)
NT-PROBNP SERPL-MCNC: 13.1 PG/ML (ref 0–900)
OPIATES UR QL: NEGATIVE
OXYCODONE UR QL SCN: NEGATIVE
OXYHGB MFR BLDV: 94.2 % (ref 94–99)
PCO2 BLDA: 44.9 MM HG (ref 35–45)
PCO2 TEMP ADJ BLD: ABNORMAL MM[HG]
PCP UR QL SCN: NEGATIVE
PH BLDA: 7.41 PH UNITS (ref 7.35–7.45)
PH UR STRIP.AUTO: 8 [PH] (ref 5–8)
PH, TEMP CORRECTED: ABNORMAL
PLATELET # BLD AUTO: 347 10*3/MM3 (ref 140–450)
PMV BLD AUTO: 10.6 FL (ref 6–12)
PO2 BLDA: 92.1 MM HG (ref 83–108)
PO2 TEMP ADJ BLD: ABNORMAL MM[HG]
POTASSIUM SERPL-SCNC: 4.3 MMOL/L (ref 3.5–5.2)
PROCALCITONIN SERPL-MCNC: 0.05 NG/ML (ref 0–0.25)
PROPOXYPH UR QL: NEGATIVE
PROT SERPL-MCNC: 7.3 G/DL (ref 6–8.5)
PROT UR QL STRIP: NEGATIVE
PROTHROMBIN TIME: 12.6 SECONDS (ref 12.1–14.7)
QT INTERVAL: 346 MS
QTC INTERVAL: 437 MS
RBC # BLD AUTO: 4.5 10*6/MM3 (ref 4.14–5.8)
SAO2 % BLDCOA: 97.8 % (ref 94–99)
SARS-COV-2 RNA RESP QL NAA+PROBE: NOT DETECTED
SODIUM SERPL-SCNC: 135 MMOL/L (ref 136–145)
SP GR UR STRIP: 1.01 (ref 1–1.03)
TRICYCLICS UR QL SCN: NEGATIVE
TROPONIN T SERPL-MCNC: <0.01 NG/ML (ref 0–0.03)
TROPONIN T SERPL-MCNC: <0.01 NG/ML (ref 0–0.03)
UROBILINOGEN UR QL STRIP: NORMAL
VENTILATOR MODE: ABNORMAL
WBC NRBC COR # BLD: 12.83 10*3/MM3 (ref 3.4–10.8)
WHOLE BLOOD HOLD COAG: NORMAL
WHOLE BLOOD HOLD SPECIMEN: NORMAL

## 2022-09-26 PROCEDURE — 82077 ASSAY SPEC XCP UR&BREATH IA: CPT | Performed by: PHYSICIAN ASSISTANT

## 2022-09-26 PROCEDURE — 83050 HGB METHEMOGLOBIN QUAN: CPT

## 2022-09-26 PROCEDURE — 96374 THER/PROPH/DIAG INJ IV PUSH: CPT

## 2022-09-26 PROCEDURE — 83880 ASSAY OF NATRIURETIC PEPTIDE: CPT | Performed by: PHYSICIAN ASSISTANT

## 2022-09-26 PROCEDURE — 85025 COMPLETE CBC W/AUTO DIFF WBC: CPT | Performed by: PHYSICIAN ASSISTANT

## 2022-09-26 PROCEDURE — 93005 ELECTROCARDIOGRAM TRACING: CPT | Performed by: STUDENT IN AN ORGANIZED HEALTH CARE EDUCATION/TRAINING PROGRAM

## 2022-09-26 PROCEDURE — 99285 EMERGENCY DEPT VISIT HI MDM: CPT

## 2022-09-26 PROCEDURE — 85610 PROTHROMBIN TIME: CPT | Performed by: PHYSICIAN ASSISTANT

## 2022-09-26 PROCEDURE — 87636 SARSCOV2 & INF A&B AMP PRB: CPT | Performed by: PHYSICIAN ASSISTANT

## 2022-09-26 PROCEDURE — 87040 BLOOD CULTURE FOR BACTERIA: CPT | Performed by: PHYSICIAN ASSISTANT

## 2022-09-26 PROCEDURE — 80306 DRUG TEST PRSMV INSTRMNT: CPT | Performed by: PHYSICIAN ASSISTANT

## 2022-09-26 PROCEDURE — 84484 ASSAY OF TROPONIN QUANT: CPT | Performed by: PHYSICIAN ASSISTANT

## 2022-09-26 PROCEDURE — 81003 URINALYSIS AUTO W/O SCOPE: CPT | Performed by: PHYSICIAN ASSISTANT

## 2022-09-26 PROCEDURE — 70450 CT HEAD/BRAIN W/O DYE: CPT

## 2022-09-26 PROCEDURE — 85379 FIBRIN DEGRADATION QUANT: CPT | Performed by: PHYSICIAN ASSISTANT

## 2022-09-26 PROCEDURE — 86140 C-REACTIVE PROTEIN: CPT | Performed by: PHYSICIAN ASSISTANT

## 2022-09-26 PROCEDURE — 80053 COMPREHEN METABOLIC PANEL: CPT | Performed by: PHYSICIAN ASSISTANT

## 2022-09-26 PROCEDURE — 84145 PROCALCITONIN (PCT): CPT | Performed by: PHYSICIAN ASSISTANT

## 2022-09-26 PROCEDURE — 70450 CT HEAD/BRAIN W/O DYE: CPT | Performed by: RADIOLOGY

## 2022-09-26 PROCEDURE — 36415 COLL VENOUS BLD VENIPUNCTURE: CPT

## 2022-09-26 PROCEDURE — 71045 X-RAY EXAM CHEST 1 VIEW: CPT | Performed by: RADIOLOGY

## 2022-09-26 PROCEDURE — 85730 THROMBOPLASTIN TIME PARTIAL: CPT | Performed by: PHYSICIAN ASSISTANT

## 2022-09-26 PROCEDURE — 94640 AIRWAY INHALATION TREATMENT: CPT

## 2022-09-26 PROCEDURE — 83735 ASSAY OF MAGNESIUM: CPT | Performed by: PHYSICIAN ASSISTANT

## 2022-09-26 PROCEDURE — 82375 ASSAY CARBOXYHB QUANT: CPT

## 2022-09-26 PROCEDURE — 83605 ASSAY OF LACTIC ACID: CPT | Performed by: PHYSICIAN ASSISTANT

## 2022-09-26 PROCEDURE — 25010000002 METHYLPREDNISOLONE PER 125 MG: Performed by: PHYSICIAN ASSISTANT

## 2022-09-26 PROCEDURE — 36600 WITHDRAWAL OF ARTERIAL BLOOD: CPT

## 2022-09-26 PROCEDURE — 82805 BLOOD GASES W/O2 SATURATION: CPT

## 2022-09-26 PROCEDURE — 82962 GLUCOSE BLOOD TEST: CPT

## 2022-09-26 PROCEDURE — 71045 X-RAY EXAM CHEST 1 VIEW: CPT

## 2022-09-26 PROCEDURE — 94799 UNLISTED PULMONARY SVC/PX: CPT

## 2022-09-26 RX ORDER — BENZONATATE 200 MG/1
200 CAPSULE ORAL 3 TIMES DAILY PRN
Qty: 15 CAPSULE | Refills: 0 | Status: SHIPPED | OUTPATIENT
Start: 2022-09-26

## 2022-09-26 RX ORDER — SODIUM CHLORIDE 0.9 % (FLUSH) 0.9 %
10 SYRINGE (ML) INJECTION AS NEEDED
Status: DISCONTINUED | OUTPATIENT
Start: 2022-09-26 | End: 2022-09-26 | Stop reason: HOSPADM

## 2022-09-26 RX ORDER — DOXYCYCLINE 100 MG/1
100 CAPSULE ORAL 2 TIMES DAILY
Qty: 20 CAPSULE | Refills: 0 | Status: SHIPPED | OUTPATIENT
Start: 2022-09-26 | End: 2022-10-06

## 2022-09-26 RX ORDER — METHYLPREDNISOLONE SODIUM SUCCINATE 125 MG/2ML
125 INJECTION, POWDER, LYOPHILIZED, FOR SOLUTION INTRAMUSCULAR; INTRAVENOUS ONCE
Status: COMPLETED | OUTPATIENT
Start: 2022-09-26 | End: 2022-09-26

## 2022-09-26 RX ORDER — IPRATROPIUM BROMIDE AND ALBUTEROL SULFATE 2.5; .5 MG/3ML; MG/3ML
3 SOLUTION RESPIRATORY (INHALATION) ONCE
Status: COMPLETED | OUTPATIENT
Start: 2022-09-26 | End: 2022-09-26

## 2022-09-26 RX ORDER — METHYLPREDNISOLONE 4 MG/1
TABLET ORAL
Qty: 21 TABLET | Refills: 0 | Status: SHIPPED | OUTPATIENT
Start: 2022-09-26

## 2022-09-26 RX ADMIN — METHYLPREDNISOLONE SODIUM SUCCINATE 125 MG: 125 INJECTION, POWDER, FOR SOLUTION INTRAMUSCULAR; INTRAVENOUS at 12:54

## 2022-09-26 RX ADMIN — IPRATROPIUM BROMIDE AND ALBUTEROL SULFATE 3 ML: .5; 2.5 SOLUTION RESPIRATORY (INHALATION) at 14:16

## 2022-10-01 LAB
BACTERIA SPEC AEROBE CULT: NORMAL
BACTERIA SPEC AEROBE CULT: NORMAL

## 2025-07-23 NOTE — NURSING NOTE
"Asked patient if he was up to date on his pneumonia and flu vaccines, pt declined and refused both of these vaccines at this time. Patient stated, \"I don't want either of those.\"  " Last OV: 07/02/2025  Next OV: 01/07/2026  Last refill: 03/18/2025      Refill request routed to Dr. Fatmia for review.